# Patient Record
Sex: MALE | Race: WHITE | NOT HISPANIC OR LATINO | Employment: UNEMPLOYED | URBAN - NONMETROPOLITAN AREA
[De-identification: names, ages, dates, MRNs, and addresses within clinical notes are randomized per-mention and may not be internally consistent; named-entity substitution may affect disease eponyms.]

---

## 2024-03-26 ENCOUNTER — HOSPITAL ENCOUNTER (INPATIENT)
Facility: HOSPITAL | Age: 59
LOS: 2 days | Discharge: HOME | End: 2024-03-28
Attending: INTERNAL MEDICINE | Admitting: INTERNAL MEDICINE
Payer: MEDICAID

## 2024-03-26 DIAGNOSIS — I21.11 ST ELEVATION MYOCARDIAL INFARCTION INVOLVING RIGHT CORONARY ARTERY: Primary | ICD-10-CM

## 2024-03-26 PROBLEM — I21.3 STEMI (ST ELEVATION MYOCARDIAL INFARCTION): Status: ACTIVE | Noted: 2024-03-26

## 2024-03-26 PROCEDURE — C1874 STENT, COATED/COV W/DEL SYS: HCPCS | Performed by: INTERNAL MEDICINE

## 2024-03-26 PROCEDURE — 92929 PR PRQ TRLUML CORONARY STENT W/ANGIO ADDL ART/BRNCH: CPT | Performed by: INTERNAL MEDICINE

## 2024-03-26 PROCEDURE — C9601 PERC DRUG-EL COR STENT BRAN: HCPCS | Performed by: INTERNAL MEDICINE

## 2024-03-26 PROCEDURE — 92941 PRQ TRLML REVSC TOT OCCL AMI: CPT | Performed by: INTERNAL MEDICINE

## 2024-03-26 PROCEDURE — C9600 PERC DRUG-EL COR STENT SING: HCPCS | Performed by: INTERNAL MEDICINE

## 2024-03-26 PROCEDURE — 99223 1ST HOSP IP/OBS HIGH 75: CPT | Performed by: INTERNAL MEDICINE

## 2024-03-26 PROCEDURE — B2111ZZ FLUOROSCOPY OF MULTIPLE CORONARY ARTERIES USING LOW OSMOLAR CONTRAST: ICD-10-PCS | Performed by: INTERNAL MEDICINE

## 2024-03-26 PROCEDURE — 4A023N7 MEASUREMENT OF CARDIAC SAMPLING AND PRESSURE, LEFT HEART, PERCUTANEOUS APPROACH: ICD-10-PCS | Performed by: INTERNAL MEDICINE

## 2024-03-26 PROCEDURE — 25010000002 PHENYLEPHRINE 10 MG/ML SOLUTION: Performed by: INTERNAL MEDICINE

## 2024-03-26 PROCEDURE — C1725 CATH, TRANSLUMIN NON-LASER: HCPCS | Performed by: INTERNAL MEDICINE

## 2024-03-26 PROCEDURE — B2151ZZ FLUOROSCOPY OF LEFT HEART USING LOW OSMOLAR CONTRAST: ICD-10-PCS | Performed by: INTERNAL MEDICINE

## 2024-03-26 PROCEDURE — 93458 L HRT ARTERY/VENTRICLE ANGIO: CPT | Performed by: INTERNAL MEDICINE

## 2024-03-26 PROCEDURE — C1769 GUIDE WIRE: HCPCS | Performed by: INTERNAL MEDICINE

## 2024-03-26 PROCEDURE — 25010000002 ATROPINE PER 0.01 MG: Performed by: INTERNAL MEDICINE

## 2024-03-26 PROCEDURE — 25510000001 IOPAMIDOL PER 1 ML: Performed by: INTERNAL MEDICINE

## 2024-03-26 PROCEDURE — 25810000003 SODIUM CHLORIDE 0.9 % SOLUTION: Performed by: INTERNAL MEDICINE

## 2024-03-26 PROCEDURE — C1894 INTRO/SHEATH, NON-LASER: HCPCS | Performed by: INTERNAL MEDICINE

## 2024-03-26 PROCEDURE — 027136Z DILATION OF CORONARY ARTERY, TWO ARTERIES WITH THREE DRUG-ELUTING INTRALUMINAL DEVICES, PERCUTANEOUS APPROACH: ICD-10-PCS | Performed by: INTERNAL MEDICINE

## 2024-03-26 PROCEDURE — 93005 ELECTROCARDIOGRAM TRACING: CPT | Performed by: INTERNAL MEDICINE

## 2024-03-26 PROCEDURE — C1887 CATHETER, GUIDING: HCPCS | Performed by: INTERNAL MEDICINE

## 2024-03-26 PROCEDURE — 25010000002 HEPARIN (PORCINE) PER 1000 UNITS: Performed by: INTERNAL MEDICINE

## 2024-03-26 PROCEDURE — C1760 CLOSURE DEV, VASC: HCPCS | Performed by: INTERNAL MEDICINE

## 2024-03-26 DEVICE — XIENCE SKYPOINT™ EVEROLIMUS ELUTING CORONARY STENT SYSTEM 2.50 MM X 23 MM / RAPID-EXCHANGE
Type: IMPLANTABLE DEVICE | Site: CORONARY | Status: FUNCTIONAL
Brand: XIENCE SKYPOINT™

## 2024-03-26 DEVICE — XIENCE SKYPOINT™ EVEROLIMUS ELUTING CORONARY STENT SYSTEM 3.00 MM X 33 MM / RAPID-EXCHANGE
Type: IMPLANTABLE DEVICE | Site: CORONARY | Status: FUNCTIONAL
Brand: XIENCE SKYPOINT™

## 2024-03-26 DEVICE — XIENCE SKYPOINT™ EVEROLIMUS ELUTING CORONARY STENT SYSTEM 2.50 MM X 12 MM / RAPID-EXCHANGE
Type: IMPLANTABLE DEVICE | Site: CORONARY | Status: FUNCTIONAL
Brand: XIENCE SKYPOINT™

## 2024-03-26 RX ORDER — NITROGLYCERIN 0.4 MG/1
0.4 TABLET SUBLINGUAL
Status: DISCONTINUED | OUTPATIENT
Start: 2024-03-26 | End: 2024-03-28 | Stop reason: HOSPADM

## 2024-03-26 RX ORDER — SODIUM CHLORIDE 9 MG/ML
INJECTION, SOLUTION INTRAVENOUS
Status: COMPLETED | OUTPATIENT
Start: 2024-03-26 | End: 2024-03-26

## 2024-03-26 RX ORDER — TEMAZEPAM 15 MG/1
15 CAPSULE ORAL NIGHTLY PRN
Status: DISCONTINUED | OUTPATIENT
Start: 2024-03-26 | End: 2024-03-28 | Stop reason: HOSPADM

## 2024-03-26 RX ORDER — LIDOCAINE HYDROCHLORIDE 20 MG/ML
INJECTION, SOLUTION INFILTRATION; PERINEURAL
Status: DISCONTINUED | OUTPATIENT
Start: 2024-03-26 | End: 2024-03-26 | Stop reason: HOSPADM

## 2024-03-26 RX ORDER — ATROPINE SULFATE 1 MG/ML
INJECTION, SOLUTION INTRAMUSCULAR; INTRAVENOUS; SUBCUTANEOUS
Status: DISCONTINUED | OUTPATIENT
Start: 2024-03-26 | End: 2024-03-26 | Stop reason: HOSPADM

## 2024-03-26 RX ORDER — CLOPIDOGREL BISULFATE 75 MG/1
75 TABLET ORAL DAILY
Status: DISCONTINUED | OUTPATIENT
Start: 2024-03-27 | End: 2024-03-28

## 2024-03-26 RX ORDER — ONDANSETRON 2 MG/ML
4 INJECTION INTRAMUSCULAR; INTRAVENOUS EVERY 6 HOURS PRN
Status: DISCONTINUED | OUTPATIENT
Start: 2024-03-26 | End: 2024-03-28 | Stop reason: HOSPADM

## 2024-03-26 RX ORDER — DIPHENHYDRAMINE HCL 25 MG
25 CAPSULE ORAL EVERY 6 HOURS PRN
Status: DISCONTINUED | OUTPATIENT
Start: 2024-03-26 | End: 2024-03-28 | Stop reason: HOSPADM

## 2024-03-26 RX ORDER — ALUMINA, MAGNESIA, AND SIMETHICONE 2400; 2400; 240 MG/30ML; MG/30ML; MG/30ML
15 SUSPENSION ORAL EVERY 6 HOURS PRN
Status: DISCONTINUED | OUTPATIENT
Start: 2024-03-26 | End: 2024-03-28 | Stop reason: HOSPADM

## 2024-03-26 RX ORDER — PHENYLEPHRINE HYDROCHLORIDE 10 MG/ML
INJECTION INTRAVENOUS
Status: DISCONTINUED | OUTPATIENT
Start: 2024-03-26 | End: 2024-03-26 | Stop reason: HOSPADM

## 2024-03-26 RX ORDER — HEPARIN SODIUM 1000 [USP'U]/ML
INJECTION, SOLUTION INTRAVENOUS; SUBCUTANEOUS
Status: DISCONTINUED | OUTPATIENT
Start: 2024-03-26 | End: 2024-03-26 | Stop reason: HOSPADM

## 2024-03-26 RX ORDER — ACETAMINOPHEN 325 MG/1
650 TABLET ORAL EVERY 4 HOURS PRN
Status: DISCONTINUED | OUTPATIENT
Start: 2024-03-26 | End: 2024-03-28 | Stop reason: HOSPADM

## 2024-03-26 RX ORDER — ONDANSETRON 4 MG/1
4 TABLET, ORALLY DISINTEGRATING ORAL EVERY 6 HOURS PRN
Status: DISCONTINUED | OUTPATIENT
Start: 2024-03-26 | End: 2024-03-28 | Stop reason: HOSPADM

## 2024-03-26 RX ORDER — CLOPIDOGREL BISULFATE 75 MG/1
TABLET ORAL
Status: DISCONTINUED | OUTPATIENT
Start: 2024-03-26 | End: 2024-03-26 | Stop reason: HOSPADM

## 2024-03-26 RX ORDER — NICOTINE 21 MG/24HR
1 PATCH, TRANSDERMAL 24 HOURS TRANSDERMAL EVERY 24 HOURS
Status: DISCONTINUED | OUTPATIENT
Start: 2024-03-26 | End: 2024-03-28 | Stop reason: HOSPADM

## 2024-03-26 RX ORDER — ASPIRIN 81 MG/1
81 TABLET ORAL DAILY
Status: DISCONTINUED | OUTPATIENT
Start: 2024-03-27 | End: 2024-03-28 | Stop reason: HOSPADM

## 2024-03-26 RX ORDER — ALPRAZOLAM 0.25 MG/1
0.25 TABLET ORAL 3 TIMES DAILY PRN
Status: DISCONTINUED | OUTPATIENT
Start: 2024-03-26 | End: 2024-03-28 | Stop reason: HOSPADM

## 2024-03-26 RX ORDER — SODIUM CHLORIDE 9 MG/ML
75 INJECTION, SOLUTION INTRAVENOUS CONTINUOUS
Status: ACTIVE | OUTPATIENT
Start: 2024-03-26 | End: 2024-03-27

## 2024-03-26 RX ORDER — ATORVASTATIN CALCIUM 40 MG/1
40 TABLET, FILM COATED ORAL NIGHTLY
Status: DISCONTINUED | OUTPATIENT
Start: 2024-03-26 | End: 2024-03-28 | Stop reason: HOSPADM

## 2024-03-26 RX ADMIN — SODIUM CHLORIDE 75 ML/HR: 9 INJECTION, SOLUTION INTRAVENOUS at 22:16

## 2024-03-26 NOTE — Clinical Note
A 4 fr sheath was  inserted with ultrasound guidance into the right femoral artery. Sheath insertion not delayed.

## 2024-03-26 NOTE — Clinical Note
Emergency Flight Crew staff delivered patient to lab.  Consents and History and Physical not obtained due to patient condition.

## 2024-03-26 NOTE — Clinical Note
Replaced previous sheath in the right femoral artery. RD consult placed, recommend ongoing dietary f/u to ensure pt is meeting adequate daily caloric needs RD services to ensure adequate daily caloric nutritional intake/Registered Dietitian

## 2024-03-26 NOTE — Clinical Note
Second inflation of balloon - Max pressure = 12 daya. 2nd Inflation of balloon - Duration = 4 seconds.

## 2024-03-26 NOTE — Clinical Note
Conveyed Dr. Heredia's recommendations to Haleigh. Number given to Central scheduling and order placed. Haleigh will call back with any questions or concerns. Follow up appointment scheduled.    Stent balloon removed intact.

## 2024-03-26 NOTE — Clinical Note
Phoned [facility] Emergency Department.  Report received.  Advises flight crew is there to transport the patient.

## 2024-03-26 NOTE — Clinical Note
Phoned [facility] for updated on patient.    States she has not heard any further information on patient.    Will follow up.

## 2024-03-26 NOTE — Clinical Note
Second inflation of balloon - Max pressure = 10 daya. 2nd Inflation of balloon - Duration = 7 seconds.

## 2024-03-26 NOTE — Clinical Note
Third inflation of balloon - Max pressure = 12 daya. 3rd Inflation of balloon - Duration = 6 seconds.

## 2024-03-26 NOTE — Clinical Note
Second inflation of balloon - Max pressure = 20 daya. 2nd Inflation of balloon - Duration = 6 seconds.

## 2024-03-27 LAB
ANION GAP SERPL CALCULATED.3IONS-SCNC: 11.8 MMOL/L (ref 5–15)
BUN SERPL-MCNC: 15 MG/DL (ref 6–20)
BUN/CREAT SERPL: 14 (ref 7–25)
CALCIUM SPEC-SCNC: 8.8 MG/DL (ref 8.6–10.5)
CHLORIDE SERPL-SCNC: 105 MMOL/L (ref 98–107)
CHOLEST SERPL-MCNC: 184 MG/DL (ref 0–200)
CO2 SERPL-SCNC: 21.2 MMOL/L (ref 22–29)
CREAT SERPL-MCNC: 1.07 MG/DL (ref 0.76–1.27)
DEPRECATED RDW RBC AUTO: 51.4 FL (ref 37–54)
EGFRCR SERPLBLD CKD-EPI 2021: 81.4 ML/MIN/1.73
ERYTHROCYTE [DISTWIDTH] IN BLOOD BY AUTOMATED COUNT: 16.3 % (ref 12.3–15.4)
GEN 5 2HR TROPONIN T REFLEX: 3505 NG/L
GLUCOSE SERPL-MCNC: 135 MG/DL (ref 65–99)
HBA1C MFR BLD: 5.7 % (ref 4.8–5.6)
HCT VFR BLD AUTO: 42.8 % (ref 37.5–51)
HDLC SERPL-MCNC: 37 MG/DL (ref 40–60)
HGB BLD-MCNC: 13.7 G/DL (ref 13–17.7)
LDLC SERPL CALC-MCNC: 122 MG/DL (ref 0–100)
LDLC/HDLC SERPL: 3.24 {RATIO}
MCH RBC QN AUTO: 27.6 PG (ref 26.6–33)
MCHC RBC AUTO-ENTMCNC: 32 G/DL (ref 31.5–35.7)
MCV RBC AUTO: 86.3 FL (ref 79–97)
PLATELET # BLD AUTO: 199 10*3/MM3 (ref 140–450)
PMV BLD AUTO: 11.1 FL (ref 6–12)
POTASSIUM SERPL-SCNC: 4.7 MMOL/L (ref 3.5–5.2)
QT INTERVAL: 410 MS
QTC INTERVAL: 496 MS
RBC # BLD AUTO: 4.96 10*6/MM3 (ref 4.14–5.8)
SODIUM SERPL-SCNC: 138 MMOL/L (ref 136–145)
TRIGL SERPL-MCNC: 136 MG/DL (ref 0–150)
TROPONIN T DELTA: 2550 NG/L
TROPONIN T SERPL HS-MCNC: 955 NG/L
VLDLC SERPL-MCNC: 25 MG/DL (ref 5–40)
WBC NRBC COR # BLD AUTO: 10.78 10*3/MM3 (ref 3.4–10.8)

## 2024-03-27 PROCEDURE — 83036 HEMOGLOBIN GLYCOSYLATED A1C: CPT | Performed by: INTERNAL MEDICINE

## 2024-03-27 PROCEDURE — 93010 ELECTROCARDIOGRAM REPORT: CPT | Performed by: INTERNAL MEDICINE

## 2024-03-27 PROCEDURE — 94761 N-INVAS EAR/PLS OXIMETRY MLT: CPT

## 2024-03-27 PROCEDURE — 94799 UNLISTED PULMONARY SVC/PX: CPT

## 2024-03-27 PROCEDURE — 84484 ASSAY OF TROPONIN QUANT: CPT | Performed by: INTERNAL MEDICINE

## 2024-03-27 PROCEDURE — 85027 COMPLETE CBC AUTOMATED: CPT | Performed by: INTERNAL MEDICINE

## 2024-03-27 PROCEDURE — 80061 LIPID PANEL: CPT | Performed by: INTERNAL MEDICINE

## 2024-03-27 PROCEDURE — 93005 ELECTROCARDIOGRAM TRACING: CPT | Performed by: INTERNAL MEDICINE

## 2024-03-27 PROCEDURE — 80048 BASIC METABOLIC PNL TOTAL CA: CPT | Performed by: INTERNAL MEDICINE

## 2024-03-27 RX ORDER — METOPROLOL SUCCINATE 25 MG/1
12.5 TABLET, EXTENDED RELEASE ORAL
Status: DISCONTINUED | OUTPATIENT
Start: 2024-03-27 | End: 2024-03-28 | Stop reason: HOSPADM

## 2024-03-27 RX ORDER — LISINOPRIL 2.5 MG/1
2.5 TABLET ORAL DAILY
Status: DISCONTINUED | OUTPATIENT
Start: 2024-03-27 | End: 2024-03-28 | Stop reason: HOSPADM

## 2024-03-27 RX ADMIN — METOPROLOL SUCCINATE 12.5 MG: 25 TABLET, EXTENDED RELEASE ORAL at 17:43

## 2024-03-27 RX ADMIN — ATORVASTATIN CALCIUM 40 MG: 40 TABLET, FILM COATED ORAL at 20:37

## 2024-03-27 RX ADMIN — CLOPIDOGREL BISULFATE 75 MG: 75 TABLET, FILM COATED ORAL at 08:05

## 2024-03-27 RX ADMIN — ASPIRIN 81 MG: 81 TABLET, COATED ORAL at 08:05

## 2024-03-27 RX ADMIN — NICOTINE TRANSDERMAL SYSTEM 1 PATCH: 21 PATCH, EXTENDED RELEASE TRANSDERMAL at 20:37

## 2024-03-27 RX ADMIN — LISINOPRIL 2.5 MG: 2.5 TABLET ORAL at 17:43

## 2024-03-27 NOTE — CASE MANAGEMENT/SOCIAL WORK
Discharge Planning Assessment   Esa     Patient Name: Dave Townsend  MRN: [id number]  Today's Date: 3/27/2024    Admit Date: 3/26/2024    Plan: Patient is an independen unemployed [employer] who lives at home with his sister where he plans to return at discharge.  Patient does not have a PCP but one will be appointed to him at discharge and he would like the [facility] if possible.  Patient denies any insurance or financial issues at this time.  Patient does not utilize Home Health or have any DME and denies the need at this time.  Patient's family will provide transportation at discharge.  No other issues or concerns are noted at this time.  CM will continune to follow and assist with any dischage needs.   Discharge Needs Assessment       Row Name 03/27/24 1426       Living Environment    People in Home other relative(s)    Name(s) of People in Home Ramón    Current Living Arrangements home    Duration at Residence 20 yrs.    Potentially Unsafe Housing Conditions none    In the past 12 months has the electric, gas, oil, or water company threatened to shut off services in your home? Yes    Primary Care Provided by self    Provides Primary Care For no one    Family Caregiver if Needed none    Quality of Family Relationships helpful;involved;supportive    Able to Return to Prior Arrangements yes       Resource/Environmental Concerns    Resource/Environmental Concerns none    Transportation Concerns none       Transportation Needs    In the past 12 months, has lack of transportation kept you from medical appointments or from getting medications? no    In the past 12 months, has lack of transportation kept you from meetings, work, or from getting things needed for daily living? No       Food Insecurity    Within the past 12 months, you worried that your food would run out before you got the money to buy more. Never true    Within the past 12 months, the food you bought just didn't last and you didn't  have money to get more. Never true       Transition Planning    Patient/Family Anticipates Transition to home    Patient/Family Anticipated Services at Transition none    Transportation Anticipated family or friend will provide       Discharge Needs Assessment    Readmission Within the Last 30 Days no previous admission in last 30 days    Equipment Currently Used at Home none    Concerns to be Addressed no discharge needs identified;denies needs/concerns at this time    Anticipated Changes Related to Illness none    Equipment Needed After Discharge none                   Discharge Plan       Row Name 03/27/24 1420       Plan    Plan Patient is an independen unemployed [employer] who lives at home with his sister where he plans to return at discharge.  Patient does not have a PCP but one will be appointed to him at discharge and he would like the [facility] if possible.  Patient denies any insurance or financial issues at this time.  Patient does not utilize Home Health or have any DME and denies the need at this time.  Patient's family will provide transportation at discharge.  No other issues or concerns are noted at this time.  CM will continune to follow and assist with any dischage needs.    Patient/Family in Agreement with Plan yes      Row Name 03/27/24 6275       Plan    Plan Comments 3/27:  HST 3,505, TDelta 2,550, HDL 37, , Heart Cath=Stent % & 2 Stents to Posterolateral Branch 95%, Pt presented OSH with C/O CP, SOB & ST elevation-KLS                  Continued Care and Services - Admitted Since 3/26/2024    No active coordination exists for this encounter.       Expected Discharge Date and Time       Expected Discharge Date Expected Discharge Time    Mar 28, 2024            Demographic Summary       Row Name 03/27/24 142       General Information    Arrived From home;hospital  Home to niece's house to [facility]    Referral Source admission list;high risk screening    Reason for  Consult discharge planning;other (see comments)  CM Trigger    Preferred Language English      Row Name 03/27/24 1358       General Information    Admission Type inpatient                   Functional Status       Row Name 03/27/24 1424       Functional Status    Usual Activity Tolerance good    Current Activity Tolerance good       Physical Activity    On average, how many days per week do you engage in moderate to strenuous exercise (like a brisk walk)? 0 days    On average, how many minutes do you engage in exercise at this level? 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                  Abuse/Neglect    No documentation.                  Legal    No documentation.                  Substance Abuse    No documentation.                  Patient Forms    No documentation.                     Vicki Lima RN

## 2024-03-27 NOTE — PLAN OF CARE
Goal Outcome Evaluation:              Outcome Evaluation: Pt remains A+Ox4. Little episode of hypertension, afebrile, on RA. Bed alarm being refused, educated on the risks, pt stated he was aware. Call light within reach. Pt resting and denies any requests at this time.

## 2024-03-27 NOTE — PLAN OF CARE
Goal Outcome Evaluation:  Plan of Care Reviewed With: patient        Progress: no change  Outcome Evaluation: VSS on RA. Site checks as ordered. IVF infusing as ordered. Call light within reach. No complaints or distress noted at this time

## 2024-03-27 NOTE — PROGRESS NOTES
LOS: 1 day     Name: Dave Townsend  Age/Sex: 58 y.o. male  :  1965        PCP: Provider, No Known    Principal Problem:    STEMI (ST elevation myocardial infarction)      Admission Information: Dave Townsend is a 58 y.o. male with coronary artery disease, hypertension, and smoking.    Chief Complaint: STEMI    Interval history: Patient was taken urgently to the Cath Lab for invasive coronary angiogram.  He received stents to the RCA and RPLB    Subjective   Patient resting comfortably in bed at the time of my visit.  He denies chest pain, shortness of air, or palpitations.      Vital Signs  Vital Signs (last 72 hrs)          0700   0659  0700   0659  0700   0659  0700   1620   Most Recent      Temp (°F)     97.2 -  98.8    98.3 -  98.4     98.3 (36.8)  1200    Heart Rate     75 -  102    78 -  97     97  1600    Resp     15 -  29    10 -  25     16  1600    BP     109/78 -  153/98    137/98 -  148/106     --  Comment: pt took it off, refused  1500    SpO2 (%)     94 -  98    94 -  98     97  1600    Flow (L/min)       2       2 2035          Temp:  [97.2 °F (36.2 °C)-98.8 °F (37.1 °C)] 98.3 °F (36.8 °C)  Heart Rate:  [] 97  Resp:  [10-29] 16  BP: (109-153)/() 145/92  There is no height or weight on file to calculate BMI.      Intake/Output Summary (Last 24 hours) at 3/27/2024 1620  Last data filed at 3/27/2024 1200  Gross per 24 hour   Intake 1419.08 ml   Output 2250 ml   Net -830.92 ml       Vitals and nursing note reviewed.   Constitutional:       Appearance: Not in distress.   Eyes:      Conjunctiva/sclera: Conjunctivae normal.   Pulmonary:      Effort: Pulmonary effort is normal.      Breath sounds: Normal breath sounds.   Cardiovascular:      Normal rate. Regular rhythm.      Murmurs: There is no murmur.   Edema:     Peripheral edema absent.   Skin:     General: Skin is warm and dry.      Comments: Right femoral  cath access dressing clean, dry, and intact.  No evidence of hematoma or ecchymosis.   Neurological:      Mental Status: Alert.         Telemetry: Sinus 70s    Results Review:     Results from last 7 days   Lab Units 03/27/24  0107   WBC 10*3/mm3 10.78   HEMOGLOBIN g/dL 13.7   PLATELETS 10*3/mm3 199     Results from last 7 days   Lab Units 03/27/24  0107   SODIUM mmol/L 138   POTASSIUM mmol/L 4.7   CHLORIDE mmol/L 105   CO2 mmol/L 21.2*   BUN mg/dL 15   CREATININE mg/dL 1.07   CALCIUM mg/dL 8.8   GLUCOSE mg/dL 135*     Results from last 7 days   Lab Units 03/27/24  0822 03/27/24  0107   HSTROP T ng/L 3,505* 955*             I reviewed the patient's new clinical results.  I reviewed the patient's new imaging results and agree with the interpretation.  I personally viewed and interpreted the patient's EKG/Telemetry data      Medication Review:   aspirin, 81 mg, Oral, Daily  atorvastatin, 40 mg, Oral, Nightly  clopidogrel, 75 mg, Oral, Daily  nicotine, 1 patch, Transdermal, Q24H           Assessment:  Coronary artery disease status post stents to the RCA and RPLB  Hypertension  Smoking      Recommendations:  Troponins continue to trend up as expected.  Will monitor for decrease.  Postprocedural echocardiogram pending.  Have started GDMT with dual antiplatelet therapy, high intensity statin, beta-blocker, and ACE inhibitor.  Expecting better control with new antihypertensives on board  Strongly encourage cessation    I discussed the patients findings and my recommendations with patient.    Further decision making based on Dr. Mendosa's assessment and recommendations      Electronically signed by MARITA Juarez, 03/27/24, 4:26 PM EDT.    .Electronically signed by Barry Mendosa MD, 03/27/24, 7:23 PM EDT.

## 2024-03-27 NOTE — DISCHARGE INSTR - APPOINTMENTS
Schedule PCP appt with  on 4-4-2024 @ 1:15 pm, gave appt card tp PT    Schedule Cardiology appt with Cinthia KIRKLAND on 4- @ 1:00 pm, gave appt card to PT

## 2024-03-27 NOTE — H&P
.      Patient Identification:  Name:  Dave Townsend  Age:  58 y.o.  Sex:  male  :  1965  MRN:  [id number]   Visit Number:  [id number]  Primary Care Physician:  Provider, No Known    Chief complaint:   Chest pain  History of presenting illness:    Patient is a pleasant 58-year-old gentleman past medical history significant for coronary disease status post PTCA and stent placement to the right coronary artery in the past presented to the outside facility with complaints of chest pain that started earlier in the day.  Was associate with shortness of breath he was noted to have ST elevation inferiorly and was transferred to [place] for emergent cath.  Patient does have history of hypertension history of cigarette smoking.    ROS: All systems reviewed and negative except as mentioned above.     ---------------------------------------------------------------------------------------------------------------------   No past medical history on file.  No past surgical history on file.  No family history on file.  Social History     Socioeconomic History    Marital status: Single     ---------------------------------------------------------------------------------------------------------------------   Allergies:  Patient has no known allergies.  ---------------------------------------------------------------------------------------------------------------------   Prior to Admission Medications       None          Hospital Scheduled Meds:     sodium chloride, , Last Rate: 75 mL/hr (24)      ---------------------------------------------------------------------------------------------------------------------   Vital Signs:     There were no vitals filed for this visit.  There is no height or weight on file to calculate BMI.  ---------------------------------------------------------------------------------------------------------------------   Physical Exam:  Constitutional:  Well-developed and  "well-nourished.  No respiratory distress.      HENT:  Head: Normocephalic and atraumatic.  Mouth:  Moist mucous membranes.    Eyes:  Conjunctivae and EOM are normal.  Pupils are equal, round, and reactive to light.  No scleral icterus.  Neck:  Neck supple.  No JVD present.    Cardiovascular:  Normal rate, regular rhythm and normal heart sounds with no murmur.  Pulmonary/Chest:  No respiratory distress, no wheezes, no crackles, with normal breath sounds and good air movement.  Abdominal:  Soft.  Bowel sounds are normal.  No distension and no tenderness.   Musculoskeletal:  No edema, no tenderness, and no deformity.  No red or swollen joints anywhere.    Neurological:  Alert and oriented to person, place, and time.  No cranial nerve deficit.  No tongue deviation.  No facial droop.  No slurred speech.   Skin:  Skin is warm and dry.  No rash noted.  No pallor.   Psychiatric:  Normal mood and affect.  Behavior is normal.  Judgment and thought content normal.   Peripheral vascular:  No edema and strong pulses on all 4 extremities.  ---------------------------------------------------------------------------------------------------------------------  EKG: Sinus rhythm, ST elevation inferiorly  Telemetry: Sinus rhythm  I have personally looked at both the EKG and the telemetry strips.  ---------------------------------------------------------------------------------------------------------------------                 Invalid input(s): \"PROT\"CrCl cannot be calculated (No successful lab value found.).  No results found for: \"AMMONIA\"          No results found for: \"HGBA1C\"  No results found for: \"TSH\", \"FREET4\"  No results found for: \"PREGTESTUR\", \"PREGSERUM\", \"HCG\", \"HCGQUANT\"  Pain Management Panel           No data to display              No results found for: \"BLOODCX\"  No results found for: \"URINECX\"  No results found for: \"WOUNDCX\"  No results found for: \"STOOLCX\"    "   ---------------------------------------------------------------------------------------------------------------------  Imaging Results (Last 7 Days)       ** No results found for the last 168 hours. **            I have personally reviewed the radiology images and read the final radiology report.  ---------------------------------------------------------------------------------------------------------------------  Assessment and Plan:   #1 CardiAC. patient with acute coronary syndrome.  ST elevation MI inferior noted.  Emergent cath done.  PTCA and stent placement to the right coronary artery.  PTCA and stent placement x 2 to the posterolateral branch.  Patient received Plavix loading doses outside facility.  Will continue Plavix and aspirin for now.  Patient does have history of compliance issues in the past and will consider low-cost medication  #2 hypertension.  Patient history of hypertension.  Will consider angiotensin receptor blocker as tolerated.  Will hold off on beta-blockers due to slow heart rate.  #3 hyperlipidemia.  Will start Lipitor.  4. Consult rehab postdischarge          This document has been electronically signed by Barry Mendosa MD [facility], Interventional Cardiology  March 26, 2024 21:25 EDT

## 2024-03-28 ENCOUNTER — APPOINTMENT (OUTPATIENT)
Dept: CARDIOLOGY | Facility: HOSPITAL | Age: 59
End: 2024-03-28
Payer: MEDICAID

## 2024-03-28 ENCOUNTER — READMISSION MANAGEMENT (OUTPATIENT)
Dept: CALL CENTER | Facility: HOSPITAL | Age: 59
End: 2024-03-28
Payer: MEDICAID

## 2024-03-28 VITALS
RESPIRATION RATE: 20 BRPM | HEART RATE: 77 BPM | TEMPERATURE: 98.2 F | DIASTOLIC BLOOD PRESSURE: 62 MMHG | OXYGEN SATURATION: 99 % | WEIGHT: 223.55 LBS | SYSTOLIC BLOOD PRESSURE: 123 MMHG

## 2024-03-28 LAB
BH CV ECHO MEAS - ACS: 2 CM
BH CV ECHO MEAS - AO MAX PG: 4.5 MMHG
BH CV ECHO MEAS - AO MEAN PG: 2 MMHG
BH CV ECHO MEAS - AO ROOT DIAM: 3.5 CM
BH CV ECHO MEAS - AO V2 MAX: 106 CM/SEC
BH CV ECHO MEAS - AO V2 VTI: 18.8 CM
BH CV ECHO MEAS - EDV(CUBED): 294.1 ML
BH CV ECHO MEAS - EDV(MOD-SP4): 224 ML
BH CV ECHO MEAS - EF(MOD-SP4): 42.9 %
BH CV ECHO MEAS - ESV(CUBED): 174.7 ML
BH CV ECHO MEAS - ESV(MOD-SP4): 128 ML
BH CV ECHO MEAS - FS: 15.9 %
BH CV ECHO MEAS - IVS/LVPW: 0.98 CM
BH CV ECHO MEAS - IVSD: 1.02 CM
BH CV ECHO MEAS - LA DIMENSION: 4.1 CM
BH CV ECHO MEAS - LAT PEAK E' VEL: 6.1 CM/SEC
BH CV ECHO MEAS - LV DIASTOLIC VOL/BSA (35-75): 101.4 CM2
BH CV ECHO MEAS - LV MASS(C)D: 307.2 GRAMS
BH CV ECHO MEAS - LV SYSTOLIC VOL/BSA (12-30): 58 CM2
BH CV ECHO MEAS - LVIDD: 6.7 CM
BH CV ECHO MEAS - LVIDS: 5.6 CM
BH CV ECHO MEAS - LVOT AREA: 4.9 CM2
BH CV ECHO MEAS - LVOT DIAM: 2.5 CM
BH CV ECHO MEAS - LVPWD: 1.04 CM
BH CV ECHO MEAS - MED PEAK E' VEL: 5.6 CM/SEC
BH CV ECHO MEAS - MV A MAX VEL: 57.4 CM/SEC
BH CV ECHO MEAS - MV E MAX VEL: 105 CM/SEC
BH CV ECHO MEAS - MV E/A: 1.83
BH CV ECHO MEAS - PA ACC TIME: 0.13 SEC
BH CV ECHO MEAS - SI(MOD-SP4): 43.5 ML/M2
BH CV ECHO MEAS - SV(MOD-SP4): 96 ML
BH CV ECHO MEASUREMENTS AVERAGE E/E' RATIO: 17.95
GEN 5 2HR TROPONIN T REFLEX: 2243 NG/L
LEFT ATRIUM VOLUME INDEX: 44.5 ML/M2
LV EF 2D ECHO EST: 30 %
TROPONIN T DELTA: 32 NG/L
TROPONIN T SERPL HS-MCNC: 2211 NG/L

## 2024-03-28 PROCEDURE — 93306 TTE W/DOPPLER COMPLETE: CPT

## 2024-03-28 PROCEDURE — 93306 TTE W/DOPPLER COMPLETE: CPT | Performed by: INTERNAL MEDICINE

## 2024-03-28 PROCEDURE — 84484 ASSAY OF TROPONIN QUANT: CPT | Performed by: NURSE PRACTITIONER

## 2024-03-28 PROCEDURE — 99239 HOSP IP/OBS DSCHRG MGMT >30: CPT | Performed by: INTERNAL MEDICINE

## 2024-03-28 RX ORDER — LISINOPRIL 2.5 MG/1
2.5 TABLET ORAL DAILY
Qty: 30 TABLET | Refills: 1 | Status: SHIPPED | OUTPATIENT
Start: 2024-03-29

## 2024-03-28 RX ORDER — ASPIRIN 81 MG/1
81 TABLET ORAL DAILY
Qty: 30 TABLET | Refills: 1 | Status: SHIPPED | OUTPATIENT
Start: 2024-03-29

## 2024-03-28 RX ORDER — METOPROLOL SUCCINATE 25 MG/1
12.5 TABLET, EXTENDED RELEASE ORAL
Qty: 30 TABLET | Refills: 1 | Status: SHIPPED | OUTPATIENT
Start: 2024-03-29

## 2024-03-28 RX ORDER — NICOTINE 21 MG/24HR
1 PATCH, TRANSDERMAL 24 HOURS TRANSDERMAL EVERY 24 HOURS
Qty: 30 PATCH | Refills: 0 | Status: SHIPPED | OUTPATIENT
Start: 2024-03-28

## 2024-03-28 RX ORDER — ATORVASTATIN CALCIUM 40 MG/1
40 TABLET, FILM COATED ORAL NIGHTLY
Qty: 30 TABLET | Refills: 1 | Status: SHIPPED | OUTPATIENT
Start: 2024-03-28

## 2024-03-28 RX ORDER — NITROGLYCERIN 0.4 MG/1
0.4 TABLET SUBLINGUAL
Qty: 25 TABLET | Refills: 0 | Status: SHIPPED | OUTPATIENT
Start: 2024-03-28

## 2024-03-28 RX ADMIN — CLOPIDOGREL BISULFATE 75 MG: 75 TABLET, FILM COATED ORAL at 08:21

## 2024-03-28 RX ADMIN — ASPIRIN 81 MG: 81 TABLET, COATED ORAL at 08:21

## 2024-03-28 RX ADMIN — LISINOPRIL 2.5 MG: 2.5 TABLET ORAL at 08:21

## 2024-03-28 RX ADMIN — METOPROLOL SUCCINATE 12.5 MG: 25 TABLET, EXTENDED RELEASE ORAL at 08:21

## 2024-03-28 RX ADMIN — TICAGRELOR 180 MG: 90 TABLET ORAL at 13:15

## 2024-03-28 NOTE — CASE MANAGEMENT/SOCIAL WORK
Case Management Discharge Note      Final Note: Patient is being discharged home on this date 3/28/24.  No needs identified.         Selected Continued Care - Admitted Since 3/26/2024                  Final Discharge Disposition Code: 01 - home or self-care

## 2024-03-28 NOTE — DISCHARGE SUMMARY
Patient Identification  Name:  Dave Townsend  Age:  58 y.o.  Sex:  male  :  1965  MRN:  [id number]  Visit Number:  [id number]    Date of Admission: 3/26/2024  Date of Discharge: 3/28/2024    PCP: Provider, No Known    DISCHARGE DIAGNOSIS  STEMI  Coronary artery disease status post PCI of MICHAEL to the RCA and 2 stents to the posterolateral branch  Hypertension  Hyperlipidemia    CONSULTS   None placed    PROCEDURES PERFORMED  Selective right and left coronary angiogram with left heart catheterization  PCI of MICHAEL to the RCA and 2 stents to the posterolateral branch  Left ventriculogram      HOSPITAL COURSE  Patient is a 58 y.o. male with coronary artery disease and previous PTCA and stent placement in the RCA.  Please see the admitting history and physical for further details.  After having been found to be having a STEMI he was taken emergently to the Cath Lab where he underwent the below described procedure.  His postprocedural course was uncomplicated.  He denied chest pain, troponins trended downward, dual antiplatelet therapy was facilitated for him, and he was deemed stable for discharge.    CONDITION ON DISCHARGE  Stable.    VITAL SIGNS  /62   Pulse 77   Temp 98.2 °F (36.8 °C) (Oral)   Resp 20   Wt 101 kg (223 lb 8.7 oz)   SpO2 99%     DISCHARGE PHYSICAL EXAM  Vitals and nursing note reviewed.   Constitutional:       Appearance: Not in distress.   Eyes:      Conjunctiva/sclera: Conjunctivae normal.   Pulmonary:      Effort: Pulmonary effort is normal.      Breath sounds: Normal breath sounds.   Cardiovascular:      Normal rate. Regular rhythm.      Murmurs: There is no murmur.   Edema:     Peripheral edema absent.   Skin:     General: Skin is warm and dry.      Comments: Right femoral cath access site open to air and without signs or symptoms of bleeding, bruising, or infection   Neurological:      Mental Status: Alert.         RESULTS REVIEW  I reviewed the patient's new clinical  "results.  Results for orders placed during the hospital encounter of 03/26/24    Cardiac Catheterization/Vascular Study    Conclusion    Mid RCA-2 lesion is 60% stenosed.    Dist RCA lesion is 100% stenosed.    RPAV lesion is 95% stenosed.    1.  Cardiac.  Patient with acute coronary syndrome involving the right coronary artery with 100% stenosis prior to intervention.  PTCA and stent placement to the right coronary artery done.  Angioplasty and stent of the posterolateral branch done also    [unfilled]    Results from last 7 days   Lab Units 03/28/24  1218 03/28/24  0930 03/27/24  0822 03/27/24  0107   HSTROP T ng/L 2,243* 2,211* 3,505* 955*     Results from last 7 days   Lab Units 03/27/24  0107   WBC 10*3/mm3 10.78   HEMOGLOBIN g/dL 13.7   PLATELETS 10*3/mm3 199     Results from last 7 days   Lab Units 03/27/24  0107   SODIUM mmol/L 138   POTASSIUM mmol/L 4.7   CHLORIDE mmol/L 105   CO2 mmol/L 21.2*   BUN mg/dL 15   CREATININE mg/dL 1.07   CALCIUM mg/dL 8.8   GLUCOSE mg/dL 135*     No results found for: \"INR\"  No results found for: \"MG\"  Lab Results   Component Value Date    TRIG 136 03/27/2024    HDL 37 (L) 03/27/2024     (H) 03/27/2024      No results found for: \"PROBNP\"    DISCHARGE DISPOSITION   Home or Self Care    DISCHARGE MEDICATIONS     Discharge Medications        New Medications        Instructions Start Date   Aspirin Low Dose 81 MG EC tablet  Generic drug: aspirin   81 mg, Oral, Daily   Start Date: March 29, 2024     atorvastatin 40 MG tablet  Commonly known as: LIPITOR   40 mg, Oral, Nightly      Brilinta 90 MG tablet tablet  Generic drug: ticagrelor   90 mg, Oral, 2 Times Daily   Start Date: March 29, 2024     lisinopril 2.5 MG tablet  Commonly known as: PRINIVIL,ZESTRIL   2.5 mg, Oral, Daily   Start Date: March 29, 2024     metoprolol succinate XL 25 MG 24 hr tablet  Commonly known as: TOPROL-XL   Take 1/2 tablet by mouth Daily.   Start Date: March 29, 2024     nicotine 21 " MG/24HR patch  Commonly known as: NICODERM CQ   1 patch, Transdermal, Every 24 Hours      nitroglycerin 0.4 MG SL tablet  Commonly known as: NITROSTAT   Place 1 tablet under the tongue Every 5 minutes as Needed for Chest Pain (Systolic BP Greater Than 100). Take no more than 3 doses in 15 minutes.               Diet Instructions    Heart healthy diet.             Activity Instructions    Activity as tolerated.            Your Scheduled Appointments      Apr 04, 2024  1:15 PM  New Patient with Claudio Jane MD  [facility] FAMILY [facility]IC[facility] [address] [address] [phone]   Please arrive 30 mins prior to your scheduled appointment time. Additionally, bring any of the following:  Photo ID  Insurance cards  Medications and supplements (in the original bottles)  Any payment you may have (co-pay, co-insurance, pre-pay)  Medical records from your previous primary care provider          Apr 11, 2024  1:00 PM  Hospital Follow Up with MARITA Juarez  [facility] CARDIOLOGY [address] [address] [phone]   -Bring photo ID, insurance card, and list of medications to appointment  -If testing was completed outside of [facility] then patient must bring images on a disc  -Copay will be collected at time of appointment  -Established patients should arrive at time of appointment       Additional instructions:    Schedule PCP appt with  on 4-4-2024 @ 1:15 pm, gave appt card tp PT    Schedule Cardiology appt with Cinthia KIRKLAND on 4- @ 1:00 pm, gave appt card to PT           Additional Instructions for the Follow-ups that You Need to Schedule       Ambulatory Referral to Cardiac Rehab   As directed              TEST  RESULTS PENDING AT DISCHARGE       Electronically signed by MARITA Juarez, 03/28/24, 5:03 PM EDT.        Time: greater than 30 minutes.     .Electronically signed by Barry  MD Deondre, 03/28/24, 6:37 PM EDT.

## 2024-03-28 NOTE — NURSING NOTE
Order received for Cardiac Rehab Consultation.       Information discussed with: Patient        Educated on: Benefits of Exercise,  Educated on Cardiac Rehab and Program Protocol, Brochure and/or educational material provided, Contact information given, and Teach Back Verified          Comments: Pt stated he doesn't have transportation and also lives quite a ways away. I gave pt our direct number and told him to call us if he thinks he could make arrangements or needs help with appts after he is home.       Thank you for the referral. Please contact the Cardiac Rehab Dept. (ext. [phone]) with any further questions or concerns.

## 2024-03-29 ENCOUNTER — TRANSITIONAL CARE MANAGEMENT TELEPHONE ENCOUNTER (OUTPATIENT)
Dept: CALL CENTER | Facility: HOSPITAL | Age: 59
End: 2024-03-29
Payer: MEDICAID

## 2024-03-29 LAB
QT INTERVAL: 406 MS
QTC INTERVAL: 493 MS

## 2024-03-29 NOTE — OUTREACH NOTE
Prep Survey      Flowsheet Row Responses   [facility] patient discharged from? [facility]   Is LACE score < 7 ? Yes   Eligibility [facility]   Date of Admission 03/26/24   Date of Discharge 03/28/24   Discharge Disposition Home or Self Care   Discharge diagnosis STEMI (ST elevation myocardial infarction), heart cath & stents   Does the patient have one of the following disease processes/diagnoses(primary or secondary)? Acute MI (STEMI,NSTEMI)   Does the patient have Home health ordered? No   Is there a DME ordered? No   Comments regarding appointments new PCP appt   Prep survey completed? Yes            Corine YI - Registered Nurse

## 2024-03-29 NOTE — OUTREACH NOTE
Call Center TCM Note      Flowsheet Row Responses   [facility] [place] patient discharged from? [facility]   Does the patient have one of the following disease processes/diagnoses(primary or secondary)? Acute MI (STEMI,NSTEMI)   TCM attempt successful? Yes   Call start time 1120   Call end time 1122   Discharge diagnosis STEMI (ST elevation myocardial infarction), heart cath & stents   Meds reviewed with patient/caregiver? Yes   Is the patient having any side effects they believe may be caused by any medication additions or changes? No   Does the patient have all prescriptions related to this admission filled (includes statins,anticoagulants,HTN meds,anti-arrhythmia meds) Yes   Is the patient taking all medications as directed (includes completed medication regime)? Yes   Comments HOSP DC FU/ NEW PT appt 4/4/24 115 pm.   Does the patient have an appointment with their PCP within 7-14 days of discharge? Yes   Has home health visited the patient within 72 hours of discharge? N/A   Psychosocial issues? No   Did the patient receive a copy of their discharge instructions? Yes   Nursing interventions Reviewed instructions with patient   What is the patient's perception of their health status since discharge? Improving   Nursing interventions Nurse provided patient education   Is the patient/caregiver able to teach back signs and symptoms of when to call for help immediately: Sudden chest discomfort, Sudden discomfort in arms, back, neck or jaw, Shortness of breath at any time, Sudden sweating or clammy skin, Nausea or vomiting, Dizziness or lightheadedness, Irregular or rapid heart rate   Nursing interventions Nurse provided patient education   Is the patient/caregiver able to teach back lifestyle changes to help prevent MIs Heart healthy diet, Reducing stress, Quit smoking   Is the patient/caregiver able to teach back ways to prevent a second heart attack: Take medications, Follow up with MD   If the patient is a current  smoker, are they able to teach back resources for cessation? [id number], Smoking cessation medications   Is the patient/caregiver able to teach back the hierarchy of who to call/visit for symptoms/problems? PCP, Specialist, Home health nurse, Urgent Care, ED, 911 Yes   TCM call completed? Yes   Wrap up additional comments Pt reports improvement. reviewed S/S to monitor for with cath site. Denies issues.   Call end time 1122            Cassie Desir RN    3/29/2024, 11:22 EDT

## 2024-03-29 NOTE — OUTREACH NOTE
Call Center TCM Note      Flowsheet Row Responses   [facility] facility patient discharged from? [facility]   Does the patient have one of the following disease processes/diagnoses(primary or secondary)? Acute MI (STEMI,NSTEMI)   TCM attempt successful? No   Unsuccessful attempts Attempt 1   Comments HOSP DC FU/ NEW PT appt 4/4/24 115 pm.   Does the patient have an appointment with their PCP within 7-14 days of discharge? Yes            Cassie Desir RN    3/29/2024, 10:11 EDT

## 2024-04-04 ENCOUNTER — OFFICE VISIT (OUTPATIENT)
Dept: FAMILY MEDICINE CLINIC | Facility: CLINIC | Age: 59
End: 2024-04-04
Payer: MEDICAID

## 2024-04-04 VITALS
TEMPERATURE: 98.6 F | HEIGHT: 71 IN | DIASTOLIC BLOOD PRESSURE: 64 MMHG | HEART RATE: 94 BPM | SYSTOLIC BLOOD PRESSURE: 126 MMHG | OXYGEN SATURATION: 98 % | RESPIRATION RATE: 14 BRPM | WEIGHT: 219 LBS | BODY MASS INDEX: 30.66 KG/M2

## 2024-04-04 DIAGNOSIS — Z00.00 HEALTHCARE MAINTENANCE: ICD-10-CM

## 2024-04-04 DIAGNOSIS — I10 ESSENTIAL HYPERTENSION: ICD-10-CM

## 2024-04-04 DIAGNOSIS — I50.20 HFREF (HEART FAILURE WITH REDUCED EJECTION FRACTION): ICD-10-CM

## 2024-04-04 DIAGNOSIS — J44.9 MIXED TYPE COPD (CHRONIC OBSTRUCTIVE PULMONARY DISEASE): ICD-10-CM

## 2024-04-04 DIAGNOSIS — E78.2 MIXED HYPERLIPIDEMIA: ICD-10-CM

## 2024-04-04 DIAGNOSIS — F17.200 SMOKER: ICD-10-CM

## 2024-04-04 DIAGNOSIS — I21.11 ST ELEVATION MYOCARDIAL INFARCTION INVOLVING RIGHT CORONARY ARTERY: Primary | ICD-10-CM

## 2024-04-04 DIAGNOSIS — R73.03 PREDIABETES: ICD-10-CM

## 2024-04-04 RX ORDER — VARENICLINE TARTRATE 1 MG/1
1 TABLET, FILM COATED ORAL 2 TIMES DAILY
Qty: 56 TABLET | Refills: 4 | Status: SHIPPED | OUTPATIENT
Start: 2024-05-02 | End: 2024-09-19

## 2024-04-04 RX ORDER — VARENICLINE TARTRATE 0.5 (11)-1
KIT ORAL
Qty: 1 EACH | Refills: 0 | Status: SHIPPED | OUTPATIENT
Start: 2024-04-04 | End: 2024-05-02

## 2024-04-04 NOTE — PROGRESS NOTES
Subjective   Dave Townsend is a 58 y.o. male.     Chief Complaint  This 58-year-old man presents today to establish care    History of Present Illness     Coronary Artery Disease  He underwent stenting x 2 of the RCA by Dr. Mendosa on 3/26/2024 after presenting with an inferior STEMI.  His coronary angiogram was otherwise unremarkable.  Echocardiogram done while in hospital revealed mild to moderate left ventricular dilation with an estimated EF of 30%.  He was ultimately discharged on dual antiplatelet therapy, and will undergo a cardiology reassessment with MARITA Latham on 4/11/2024.  He has felt well since his return home.he admits to shortness of breath with above average exertion, but feels this is at baseline.  He denies any chest pain, palpitations, lightheadedness, apnea, PND, calf pain, or swelling of the ankles.  He gives a history of previous MI treated in [place].  He states that he stopped all of his medications 5 or 6 years ago.  Lab Results   Component Value Date    WBC 10.78 03/27/2024    HGB 13.7 03/27/2024    HCT 42.8 03/27/2024    MCV 86.3 03/27/2024     03/27/2024     Essential Hypertension  He gives a long history of hypertension.  He was discharged on lisinopril and metoprolol and denies any apparent side effects.  Lab Results   Component Value Date    GLUCOSE 135 (H) 03/27/2024    CALCIUM 8.8 03/27/2024     03/27/2024    K 4.7 03/27/2024    CO2 21.2 (L) 03/27/2024     03/27/2024    BUN 15 03/27/2024    CREATININE 1.07 03/27/2024    EGFR 81.4 03/27/2024    BCR 14.0 03/27/2024    ANIONGAP 11.8 03/27/2024     Hyperlipidemia  He was discharged on atorvastatin, and again, denies any apparent side effects.  Lab Results   Component Value Date    CHOL 184 03/27/2024    TRIG 136 03/27/2024    HDL 37 (L) 03/27/2024     (H) 03/27/2024     Prediabetes  His A1c was elevated in hospital.  He states his glucose has never been high in the past.  Lab Results   Component Value  Date    HGBA1C 5.70 (H) 03/27/2024     Smoker  He gives a 50-pack-year history and is currently averaging 1 pack/day.  He was discharged on nicotine patches.    The following portions of the patient's history were reviewed and updated as appropriate: allergies, current medications, past family history, past medical history, past social history, past surgical history, and problem list.    Review of Systems   Constitutional:  Positive for fatigue. Negative for chills and fever.   HENT:  Negative for congestion, ear pain, hearing loss, postnasal drip, rhinorrhea, sinus pressure, sneezing, sore throat and voice change.    Eyes:  Negative for visual disturbance.   Respiratory:  Positive for cough and shortness of breath. Negative for wheezing.    Cardiovascular:  Negative for chest pain, palpitations and leg swelling.   Gastrointestinal:  Negative for abdominal pain, blood in stool, constipation, diarrhea, nausea, vomiting and GERD.   Genitourinary:  Positive for nocturia (2-3). Negative for dysuria, frequency, hematuria and urgency.   Musculoskeletal:  Negative for arthralgias, back pain, joint swelling, myalgias and neck pain.   Skin:  Negative for rash.   Neurological:  Negative for weakness and numbness.   Psychiatric/Behavioral:  Positive for sleep disturbance. Negative for suicidal ideas and depressed mood. The patient is not nervous/anxious.      Objective   Physical Exam  Constitutional:       General: He is not in acute distress.     Appearance: Normal appearance. He is well-developed. He is not ill-appearing or diaphoretic.      Comments: Appeared a bit older than stated age.  Alert and in fair spirits. No apparent distress. No pallor, jaundice, diaphoresis, or cyanosis.     HENT:      Head: Atraumatic.      Right Ear: Tympanic membrane, ear canal and external ear normal.      Left Ear: Tympanic membrane, ear canal and external ear normal.      Mouth/Throat:      Lips: No lesions.      Mouth: Mucous membranes  are moist. No oral lesions.      Dentition: Dental caries present.      Pharynx: No oropharyngeal exudate or posterior oropharyngeal erythema.   Eyes:      General: Lids are normal. Gaze aligned appropriately.      Extraocular Movements: Extraocular movements intact.      Conjunctiva/sclera: Conjunctivae normal.      Pupils: Pupils are equal.   Neck:      Thyroid: No thyroid mass or thyromegaly.      Vascular: No carotid bruit.      Trachea: Trachea normal. No tracheal deviation.   Cardiovascular:      Rate and Rhythm: Normal rate and regular rhythm.      Pulses:           Dorsalis pedis pulses are 2+ on the right side and 2+ on the left side.        Posterior tibial pulses are 2+ on the right side and 2+ on the left side.      Heart sounds: Normal heart sounds, S1 normal and S2 normal. No murmur heard.     No gallop.      Comments: No calf tenderness  Pulmonary:      Effort: Pulmonary effort is normal.      Breath sounds: Wheezing (diffuse - mild) present.      Comments: Pulmonary hyperinflation  Abdominal:      General: Bowel sounds are normal. There is no distension or abdominal bruit.      Palpations: Abdomen is soft. There is no hepatomegaly, splenomegaly or mass.      Tenderness: There is no abdominal tenderness.      Hernia: No hernia is present.   Musculoskeletal:      Right lower leg: No edema.      Left lower leg: No edema.   Lymphadenopathy:      Head:      Right side of head: No submental, submandibular, tonsillar, preauricular, posterior auricular or occipital adenopathy.      Left side of head: No submental, submandibular, tonsillar, preauricular, posterior auricular or occipital adenopathy.      Cervical: No cervical adenopathy.      Upper Body:      Right upper body: No supraclavicular adenopathy.      Left upper body: No supraclavicular adenopathy.   Skin:     General: Skin is warm and dry.      Coloration: Skin is not cyanotic, jaundiced or pale.      Findings: No rash.      Nails: There is no  clubbing.   Neurological:      Mental Status: He is alert and oriented to person, place, and time.      Cranial Nerves: No dysarthria or facial asymmetry.      Sensory: No sensory deficit.      Motor: No tremor.      Coordination: Coordination normal.      Gait: Gait normal.   Psychiatric:         Attention and Perception: Attention normal.         Mood and Affect: Mood normal.         Speech: Speech normal.         Behavior: Behavior normal.         Thought Content: Thought content normal.       Assessment & Plan   Problems Addressed this Visit          Cardiac and Vasculature    Essential hypertension   Hypertension: at goal. Evidence of target organ damage: coronary artery disease and heart failure.  Encouraged to continue to work on diet and exercise plan.   Continue current medication    Relevant Orders    Comprehensive Metabolic Panel    TSH    Urinalysis With Microscopic If Indicated (No Culture) - Urine, Clean Catch    HFrEF (heart failure with reduced ejection fraction)  Will plan on updating an echo or a MUGA scan at his return    Relevant Orders    TSH    Mixed hyperlipidemia  As above.   Continue current medication.  Scheduled for updated labs in 4 to 6 weeks    Relevant Orders    Comprehensive Metabolic Panel    Lipid Panel    TSH    STEMI (ST elevation myocardial infarction)  S/P stenting x 2 of the RCA  Reminded regarding risk factor modification with an emphasis on tobacco cessation.  Continue dual antiplatelet therapy.  Follow up with cardiology     Relevant Orders    CBC & Differential       Endocrine and Metabolic    Prediabetes  As above.  Will consider starting on an SGLT2 inhibitor at his return    Relevant Orders    Comprehensive Metabolic Panel    TSH       Health Encounters    Healthcare maintenance  Advised that he is due for pneumococcal and shingles vaccination along with an updated Tdap  Recommended influenza vaccination in the fall  Reviewed the potential benefits of colon, lung, and  prostate cancer screening.  Patient will consider and this will be discussed further at his return    Relevant Orders    Hepatitis C Antibody       Pulmonary and Pneumonias    Mixed type COPD (chronic obstructive pulmonary disease)  Reminded regarding the importance of smoking cessation    Relevant Orders    CBC & Differential       Tobacco    Smoker  Lengthy discussion regarding the potential sequela of continued tobacco use and the options with respect to cessation.  Will add varenicline to nicotine replacement    Relevant Medications    Varenicline Tartrate, Starter, 0.5 MG X 11 & 1 MG X 42 tablet therapy pack    varenicline (CHANTIX) 1 MG tablet (Start on 5/2/2024)     Diagnoses         Codes Comments    ST elevation myocardial infarction involving right coronary artery    -  Primary ICD-10-CM: I21.11  ICD-9-CM: 410.31     Mixed hyperlipidemia     ICD-10-CM: E78.2  ICD-9-CM: 272.2     Essential hypertension     ICD-10-CM: I10  ICD-9-CM: 401.9     Prediabetes     ICD-10-CM: R73.03  ICD-9-CM: 790.29     Healthcare maintenance     ICD-10-CM: Z00.00  ICD-9-CM: V70.0     Mixed type COPD (chronic obstructive pulmonary disease)     ICD-10-CM: J44.9  ICD-9-CM: 496     Smoker     ICD-10-CM: F17.200  ICD-9-CM: 305.1     HFrEF (heart failure with reduced ejection fraction)     ICD-10-CM: I50.20  ICD-9-CM: 428.20           I spent 66 minutes caring for Dave Townsend on this date of service. This time includes time spent by me in the following activities:reviewing tests, performing a medically appropriate examination and/or evaluation , counseling and educating the patient/family/caregiver, ordering medications, tests, or procedures and documenting information in the medical record

## 2024-04-08 ENCOUNTER — PATIENT ROUNDING (BHMG ONLY) (OUTPATIENT)
Dept: FAMILY MEDICINE CLINIC | Facility: CLINIC | Age: 59
End: 2024-04-08
Payer: MEDICAID

## 2024-04-08 NOTE — PROGRESS NOTES
April 8, 2024    Hello, may I speak with Dave Townsend?    My name is Terri Flowers    I am  with [facility]  [address] [place] [id number] [phone].    Before we get started may I verify your date of birth? 1965    I am calling to officially welcome you to our practice and ask about your recent visit. Is this a good time to talk? No, left VM    Tell me about your visit with us. What things went well?         We're always looking for ways to make our patients' experiences even better. Do you have recommendations on ways we may improve?      Overall were you satisfied with your first visit to our practice?        I appreciate you taking the time to speak with me today. Is there anything else I can do for you?       Thank you, and have a great day.

## 2024-04-08 NOTE — PROGRESS NOTES
April 8, 2024    Hello, may I speak with Dave Townsend?    My name is eTrri Flowers    I am  with [facility]  [address] [place] [id number] [phone].    Before we get started may I verify your date of birth? 1965    I am calling to officially welcome you to our practice and ask about your recent visit. Is this a good time to talk? No left VM    Tell me about your visit with us. What things went well?         We're always looking for ways to make our patients' experiences even better. Do you have recommendations on ways we may improve?      Overall were you satisfied with your first visit to our practice?        I appreciate you taking the time to speak with me today. Is there anything else I can do for you?       Thank you, and have a great day.

## 2024-04-11 ENCOUNTER — OFFICE VISIT (OUTPATIENT)
Dept: CARDIOLOGY | Facility: CLINIC | Age: 59
End: 2024-04-11
Payer: MEDICAID

## 2024-04-11 VITALS
WEIGHT: 222 LBS | DIASTOLIC BLOOD PRESSURE: 91 MMHG | HEART RATE: 88 BPM | SYSTOLIC BLOOD PRESSURE: 143 MMHG | HEIGHT: 71 IN | OXYGEN SATURATION: 97 % | BODY MASS INDEX: 31.08 KG/M2

## 2024-04-11 DIAGNOSIS — F17.200 SMOKER: ICD-10-CM

## 2024-04-11 DIAGNOSIS — I10 ESSENTIAL HYPERTENSION: Chronic | ICD-10-CM

## 2024-04-11 DIAGNOSIS — E78.2 MIXED HYPERLIPIDEMIA: Chronic | ICD-10-CM

## 2024-04-11 DIAGNOSIS — I25.10 CORONARY ARTERY DISEASE INVOLVING NATIVE CORONARY ARTERY OF NATIVE HEART WITHOUT ANGINA PECTORIS: Chronic | ICD-10-CM

## 2024-04-11 DIAGNOSIS — I50.20 HFREF (HEART FAILURE WITH REDUCED EJECTION FRACTION): Primary | ICD-10-CM

## 2024-04-11 RX ORDER — DAPAGLIFLOZIN 10 MG/1
10 TABLET, FILM COATED ORAL DAILY
Qty: 30 TABLET | Refills: 1 | Status: SHIPPED | OUTPATIENT
Start: 2024-04-11

## 2024-04-11 RX ORDER — SACUBITRIL AND VALSARTAN 24; 26 MG/1; MG/1
1 TABLET, FILM COATED ORAL 2 TIMES DAILY
Qty: 60 TABLET | Refills: 1 | Status: SHIPPED | OUTPATIENT
Start: 2024-04-11

## 2024-04-11 NOTE — PROGRESS NOTES
"Chief Complaint  Chest Pain (Patient denies chest pain or shortness of breath since inpatient stay )    Subjective          Dave Townsend presents to [facility] for follow up.    History of Present Illness  Mr. Townsend presents for follow-up of ASCVD, hypertension, hyperlipidemia, and HFrEF.  He is a new patient to our office after having been initially seen in the emergency room.  He underwent invasive coronary angiogram 03/26/2024 and received stents to the RCA and the posterolateral branch.  He was also found to have decreased ejection fraction.  He reports that he has been compliant with dual antiplatelet therapy.  He is not experiencing any troublesome side effects.    His lipids were not at goal when he was in the hospital.  We will continue to monitor and adjust as indicated    His blood pressure is not quite at goal.  As he plans to quit smoking, we anticipate a resultant decrease in his blood pressure and will defer medication adjustment at this time.    We discussed the relationship between coronary artery disease and cigarette smoking.More than 3 minutes but less than 10 was spent in counseling regarding smoking cessation.  Discussed motivation for quitting, previous attempts, and the options for pharmacological support.  He would like to try Chantix.  Quit approaches discussed and he will do the reduce by half beginning 05/01/24.  He is currently smoking 1/2 pack/day and so will decrease to an average of 5 cigarettes daily.  Plan to follow-up in 1 month.    Tobacco Use: High Risk (4/28/2024)    Patient History     Smoking Tobacco Use: Every Day     Smokeless Tobacco Use: Never     Passive Exposure: Never       Objective     Vital Signs:   /91 (BP Location: Left arm, Patient Position: Sitting, Cuff Size: Adult)   Pulse 88   Ht 180.3 cm (71\")   Wt 101 kg (222 lb)   SpO2 97%   BMI 30.96 kg/m²       Physical Exam  Vitals and nursing note reviewed. "   Constitutional:       General: He is not in acute distress.     Appearance: He is obese.   HENT:      Head: Normocephalic and atraumatic.   Eyes:      Conjunctiva/sclera: Conjunctivae normal.   Neck:      Vascular: No carotid bruit.   Cardiovascular:      Rate and Rhythm: Normal rate and regular rhythm.      Pulses: Normal pulses.   Pulmonary:      Effort: Pulmonary effort is normal.      Breath sounds: Normal breath sounds.   Musculoskeletal:      Cervical back: Neck supple.      Right lower leg: No edema.      Left lower leg: No edema.   Skin:     General: Skin is warm and dry.   Neurological:      General: No focal deficit present.   Psychiatric:         Mood and Affect: Mood normal.         Behavior: Behavior normal.          Result Review :   The following data was reviewed by: MARITA Juarez on 04/11/2024:  Common labs          3/27/2024    01:07   Common Labs   Glucose 135    BUN 15    Creatinine 1.07    Sodium 138    Potassium 4.7    Chloride 105    Calcium 8.8    WBC 10.78    Hemoglobin 13.7    Hematocrit 42.8    Platelets 199    Total Cholesterol 184    Triglycerides 136    HDL Cholesterol 37    LDL Cholesterol  122    Hemoglobin A1C 5.70      Lipid Panel          3/27/2024    01:07   Lipid Panel   Total Cholesterol 184    Triglycerides 136    HDL Cholesterol 37    VLDL Cholesterol 25    LDL Cholesterol  122    LDL/HDL Ratio 3.24      Data reviewed : Cardiology studies as detailed below      Last Cardiac Cath  Results for orders placed during the hospital encounter of 03/26/24    Cardiac Catheterization/Vascular Study    Conclusion    Mid RCA-2 lesion is 60% stenosed.    Dist RCA lesion is 100% stenosed.    RPAV lesion is 95% stenosed.    1.  Cardiac.  Patient with acute coronary syndrome involving the right coronary artery with 100% stenosis prior to intervention.  PTCA and stent placement to the right coronary artery done.  Angioplasty and stent of the posterolateral branch done also      Last  Stress test       Last Echo  Results for orders placed during the hospital encounter of 03/26/24    Adult Transthoracic Echo Complete W/ Cont if Necessary Per Protocol    Interpretation Summary    Left ventricular systolic function is moderately decreased. Estimated left ventricular EF = 30% Left ventricular ejection fraction appears to be 26 - 30%.    The left ventricular cavity is mild to moderately dilated.    Left ventricular diastolic function was normal.             Current Outpatient Medications   Medication Sig Dispense Refill    aspirin 81 MG EC tablet Take 1 tablet by mouth Daily. 30 tablet 1    atorvastatin (LIPITOR) 40 MG tablet Take 1 tablet by mouth Every Night. 30 tablet 1    metoprolol succinate XL (TOPROL-XL) 25 MG 24 hr tablet Take 1/2 tablet by mouth Daily. 30 tablet 1    nicotine (NICODERM CQ) 21 MG/24HR patch Place 1 patch on the skin as directed by provider Daily. 30 patch 0    nitroglycerin (NITROSTAT) 0.4 MG SL tablet Place 1 tablet under the tongue Every 5 minutes as Needed for Chest Pain (Systolic BP Greater Than 100). Take no more than 3 doses in 15 minutes. 25 tablet 0    ticagrelor (BRILINTA) 90 MG tablet tablet Take 1 tablet by mouth 2 (Two) Times a Day. 60 tablet 1    dapagliflozin Propanediol 10 MG tablet Take 10 mg by mouth Daily. 30 tablet 1    sacubitril-valsartan (Entresto) 24-26 MG tablet Take 1 tablet by mouth 2 (Two) Times a Day. 60 tablet 1    [START ON 5/2/2024] varenicline (CHANTIX) 1 MG tablet Take 1 tablet by mouth 2 (Two) Times a Day for 140 days. (Patient not taking: Reported on 4/11/2024) 56 tablet 4    Varenicline Tartrate, Starter, 0.5 MG X 11 & 1 MG X 42 tablet therapy pack Take 0.5 mg by mouth Daily for 3 days, THEN 0.5 mg 2 (Two) Times a Day for 4 days, THEN 1 mg 2 (Two) Times a Day for 21 days. Take 0.5 mg po daily x 3 days, then 0.5 mg po bid x 4 days, then 1 mg po bid (Patient not taking: Reported on 4/11/2024) 1 each 0     No current facility-administered  medications for this visit.            Assessment and Plan    Problem List Items Addressed This Visit       Essential hypertension (Chronic)    Mixed hyperlipidemia (Chronic)    Overview     3/27/2024-total cholesterol 184, triglycerides 136, HDL 37,          Smoker    HFrEF (heart failure with reduced ejection fraction) - Primary (Chronic)    Overview     03/26/2024-LVEF 26 to 30%         Relevant Medications    dapagliflozin Propanediol 10 MG tablet    sacubitril-valsartan (Entresto) 24-26 MG tablet    Other Relevant Orders    [facility] Wearable Cardioverter-Defibrillator    Coronary artery disease involving native coronary artery of native heart without angina pectoris (Chronic)    Overview     03/26/[id number] of MICHAEL to RCA and posterolateral         Relevant Medications    sacubitril-valsartan (Entresto) 24-26 MG tablet     Diagnoses and all orders for this visit:    1. HFrEF (heart failure with reduced ejection fraction) (Primary)  -     dapagliflozin Propanediol 10 MG tablet; Take 10 mg by mouth Daily.  Dispense: 30 tablet; Refill: 1  -     sacubitril-valsartan (Entresto) 24-26 MG tablet; Take 1 tablet by mouth 2 (Two) Times a Day.  Dispense: 60 tablet; Refill: 1  -     [facility] Wearable Cardioverter-Defibrillator    2. Smoker    3. Coronary artery disease involving native coronary artery of native heart without angina pectoris    4. Mixed hyperlipidemia    5. Essential hypertension            Follow Up     Return in about 4 weeks (around 5/9/2024).    Patient was given instructions and counseling regarding his condition or for health maintenance advice. Please see specific information pulled into the AVS if appropriate.       Electronically signed by MARITA Juarez, 04/28/24, 11:20 AM EDT.      Dictated Utilizing Dragon Dictation: Part of this note may be an electronic transcription/translation of spoken language to printed text using the Dragon Dictation System

## 2024-04-28 PROBLEM — I25.10 CORONARY ARTERY DISEASE INVOLVING NATIVE CORONARY ARTERY OF NATIVE HEART WITHOUT ANGINA PECTORIS: Chronic | Status: ACTIVE | Noted: 2024-04-28

## 2024-04-28 PROBLEM — I50.20 HFREF (HEART FAILURE WITH REDUCED EJECTION FRACTION): Chronic | Status: ACTIVE | Noted: 2024-04-04

## 2024-04-28 PROBLEM — E78.2 MIXED HYPERLIPIDEMIA: Chronic | Status: ACTIVE | Noted: 2024-04-04

## 2024-04-28 PROBLEM — I10 ESSENTIAL HYPERTENSION: Chronic | Status: ACTIVE | Noted: 2024-04-04

## 2024-04-28 PROBLEM — I21.3 STEMI (ST ELEVATION MYOCARDIAL INFARCTION): Status: RESOLVED | Noted: 2024-03-26 | Resolved: 2024-04-28

## 2024-04-28 PROBLEM — I25.10 CORONARY ARTERY DISEASE INVOLVING NATIVE CORONARY ARTERY OF NATIVE HEART WITHOUT ANGINA PECTORIS: Status: ACTIVE | Noted: 2024-04-28

## 2024-05-02 ENCOUNTER — TELEPHONE (OUTPATIENT)
Dept: CARDIOLOGY | Facility: CLINIC | Age: 59
End: 2024-05-02
Payer: MEDICAID

## 2024-05-02 NOTE — TELEPHONE ENCOUNTER
TRYING TO CALL PATIENT TO MOVE HIS APPT. TIME UP TO AN EARLIER TIME ON THE SAME DATE. PROVIDER IS OUT OF OFFICE AFTER LUNCH ON THIS DATE.

## 2024-05-09 ENCOUNTER — OFFICE VISIT (OUTPATIENT)
Dept: CARDIOLOGY | Facility: CLINIC | Age: 59
End: 2024-05-09
Payer: MEDICAID

## 2024-05-09 VITALS
WEIGHT: 222.4 LBS | BODY MASS INDEX: 31.14 KG/M2 | HEIGHT: 71 IN | OXYGEN SATURATION: 97 % | SYSTOLIC BLOOD PRESSURE: 130 MMHG | DIASTOLIC BLOOD PRESSURE: 83 MMHG | HEART RATE: 80 BPM

## 2024-05-09 DIAGNOSIS — I50.20 HFREF (HEART FAILURE WITH REDUCED EJECTION FRACTION): Primary | Chronic | ICD-10-CM

## 2024-05-09 DIAGNOSIS — I25.10 CORONARY ARTERY DISEASE INVOLVING NATIVE CORONARY ARTERY OF NATIVE HEART WITHOUT ANGINA PECTORIS: Chronic | ICD-10-CM

## 2024-05-09 DIAGNOSIS — E78.2 MIXED HYPERLIPIDEMIA: Chronic | ICD-10-CM

## 2024-05-09 DIAGNOSIS — I10 ESSENTIAL HYPERTENSION: Chronic | ICD-10-CM

## 2024-05-09 RX ORDER — SACUBITRIL AND VALSARTAN 24; 26 MG/1; MG/1
1 TABLET, FILM COATED ORAL 2 TIMES DAILY
Qty: 56 TABLET | Refills: 0 | COMMUNITY
Start: 2024-05-09

## 2024-05-09 RX ORDER — SPIRONOLACTONE 25 MG/1
12.5 TABLET ORAL DAILY
Qty: 30 TABLET | Refills: 6 | Status: SHIPPED | OUTPATIENT
Start: 2024-05-09

## 2024-05-09 RX ORDER — DAPAGLIFLOZIN 10 MG/1
10 TABLET, FILM COATED ORAL DAILY
Qty: 28 TABLET | Refills: 0 | COMMUNITY
Start: 2024-05-09

## 2024-05-20 ENCOUNTER — HOSPITAL ENCOUNTER (OUTPATIENT)
Dept: CARDIOLOGY | Facility: HOSPITAL | Age: 59
Discharge: HOME | End: 2024-05-20
Admitting: PHYSICIAN ASSISTANT
Payer: MEDICAID

## 2024-05-20 VITALS
HEIGHT: 71 IN | BODY MASS INDEX: 31.08 KG/M2 | OXYGEN SATURATION: 96 % | HEART RATE: 77 BPM | DIASTOLIC BLOOD PRESSURE: 76 MMHG | SYSTOLIC BLOOD PRESSURE: 121 MMHG | WEIGHT: 222 LBS

## 2024-05-20 DIAGNOSIS — I50.20 HFREF (HEART FAILURE WITH REDUCED EJECTION FRACTION): Primary | Chronic | ICD-10-CM

## 2024-05-20 DIAGNOSIS — F17.200 SMOKER: ICD-10-CM

## 2024-05-20 DIAGNOSIS — I10 ESSENTIAL HYPERTENSION: Chronic | ICD-10-CM

## 2024-05-20 DIAGNOSIS — I25.10 ASCVD (ARTERIOSCLEROTIC CARDIOVASCULAR DISEASE): ICD-10-CM

## 2024-05-20 DIAGNOSIS — Z72.0 TOBACCO ABUSE: ICD-10-CM

## 2024-05-20 LAB
ABSOLUTE LUNG FLUID CONTENT: 34 % (ref 20–35)
ANION GAP SERPL CALCULATED.3IONS-SCNC: 9.6 MMOL/L (ref 5–15)
BUN SERPL-MCNC: 22 MG/DL (ref 6–20)
BUN/CREAT SERPL: 18.2 (ref 7–25)
CALCIUM SPEC-SCNC: 9.5 MG/DL (ref 8.6–10.5)
CHLORIDE SERPL-SCNC: 108 MMOL/L (ref 98–107)
CO2 SERPL-SCNC: 20.4 MMOL/L (ref 22–29)
CREAT SERPL-MCNC: 1.21 MG/DL (ref 0.76–1.27)
EGFRCR SERPLBLD CKD-EPI 2021: 70.3 ML/MIN/1.73
GLUCOSE SERPL-MCNC: 107 MG/DL (ref 65–99)
MAGNESIUM SERPL-MCNC: 2.2 MG/DL (ref 1.6–2.6)
NT-PROBNP SERPL-MCNC: 194 PG/ML (ref 0–900)
POTASSIUM SERPL-SCNC: 4.4 MMOL/L (ref 3.5–5.2)
SODIUM SERPL-SCNC: 138 MMOL/L (ref 136–145)

## 2024-05-20 PROCEDURE — 83880 ASSAY OF NATRIURETIC PEPTIDE: CPT | Performed by: PHYSICIAN ASSISTANT

## 2024-05-20 PROCEDURE — 83735 ASSAY OF MAGNESIUM: CPT | Performed by: PHYSICIAN ASSISTANT

## 2024-05-20 PROCEDURE — 36415 COLL VENOUS BLD VENIPUNCTURE: CPT | Performed by: PHYSICIAN ASSISTANT

## 2024-05-20 PROCEDURE — 94726 PLETHYSMOGRAPHY LUNG VOLUMES: CPT | Performed by: PHYSICIAN ASSISTANT

## 2024-05-20 PROCEDURE — 80048 BASIC METABOLIC PNL TOTAL CA: CPT | Performed by: PHYSICIAN ASSISTANT

## 2024-05-20 RX ORDER — VARENICLINE TARTRATE 1 MG/1
1 TABLET, FILM COATED ORAL 2 TIMES DAILY
Qty: 56 TABLET | Refills: 1 | Status: SHIPPED | OUTPATIENT
Start: 2024-06-17 | End: 2024-08-12

## 2024-05-20 RX ORDER — SPIRONOLACTONE 25 MG/1
12.5 TABLET ORAL DAILY
Qty: 90 TABLET | Refills: 0 | Status: SHIPPED | OUTPATIENT
Start: 2024-05-20 | End: 2024-11-16

## 2024-05-20 RX ORDER — ATORVASTATIN CALCIUM 40 MG/1
40 TABLET, FILM COATED ORAL NIGHTLY
Qty: 90 TABLET | Refills: 0 | Status: SHIPPED | OUTPATIENT
Start: 2024-05-20 | End: 2024-08-18

## 2024-05-20 RX ORDER — VARENICLINE TARTRATE 0.5 (11)-1
KIT ORAL
Qty: 53 EACH | Refills: 0 | Status: SHIPPED | OUTPATIENT
Start: 2024-05-20 | End: 2024-06-17

## 2024-05-20 RX ORDER — METOPROLOL SUCCINATE 25 MG/1
12.5 TABLET, EXTENDED RELEASE ORAL
Qty: 45 TABLET | Refills: 0 | Status: SHIPPED | OUTPATIENT
Start: 2024-05-20 | End: 2024-08-18

## 2024-05-20 RX ORDER — SACUBITRIL AND VALSARTAN 24; 26 MG/1; MG/1
1 TABLET, FILM COATED ORAL 2 TIMES DAILY
Qty: 180 TABLET | Refills: 1 | Status: SHIPPED | OUTPATIENT
Start: 2024-05-20 | End: 2024-08-18

## 2024-05-20 RX ORDER — VERICIGUAT 10 MG/1
10 TABLET, FILM COATED ORAL DAILY
Qty: 90 TABLET | Refills: 3 | Status: SHIPPED | OUTPATIENT
Start: 2024-06-03 | End: 2025-06-03

## 2024-05-20 RX ORDER — DAPAGLIFLOZIN 10 MG/1
10 TABLET, FILM COATED ORAL DAILY
Qty: 90 TABLET | Refills: 0 | Status: SHIPPED | OUTPATIENT
Start: 2024-05-20 | End: 2024-08-18

## 2024-05-20 NOTE — PROGRESS NOTES
" [facility]  Pharmacist Note     Dave Townsend is a 58 y.o. male seen in the [facility] for HFrEF for most EF of 26-30% on 3/28/24. Patient was referred to us by cardiology since he could not afford Farxiga. Cardiology has given him samples of Entresto and Farxiga.     Dave Townsend reports a poor understanding of medications, but reports adherence to his doses since being discharged in March. He brought in his medication bottles with him today.     He reports that he checks his BP daily and it runs \"good\" for the most part. He denies any episodes of lightheadedness.     He denies any swelling or SOB and is currently only on 12.5mg of Spironolactone at this time.     He reports that he only has $20 in his pocket and currently does not have an income. He has money because his sister is kind and gives him some. He has applied for disability and therefore would love some assistance with his copays.       Medication Use:   Hx of med intolerances:  None related to HF  Retail Rx Management: [facility] lives in [place]- cannot mail; want 90 day supplies!- focusing today 5/20/24- re-evlauate need    Past Medical History:   Diagnosis Date    Abnormal ECG     Hypertension     STEMI (ST elevation myocardial infarction) 03/26/2024     ALLERGIES: Patient has no known allergies.  Current Outpatient Medications   Medication Sig Dispense Refill    aspirin 81 MG EC tablet Take 1 tablet by mouth Daily. 30 tablet 1    atorvastatin (LIPITOR) 40 MG tablet Take 1 tablet by mouth Every Night for 90 days. 90 tablet 0    dapagliflozin Propanediol 10 MG tablet Take 10 mg by mouth Daily for 90 days. 90 tablet 0    metoprolol succinate XL (TOPROL-XL) 25 MG 24 hr tablet Take 1/2 tablet by mouth Daily. 45 tablet 0    nicotine (NICODERM CQ) 21 MG/24HR patch Place 1 patch on the skin as directed by provider Daily. 30 patch 0    nitroglycerin (NITROSTAT) 0.4 MG SL tablet Place 1 tablet under the tongue Every 5 minutes as Needed for " "Chest Pain (Systolic BP Greater Than 100). Take no more than 3 doses in 15 minutes. 25 tablet 0    sacubitril-valsartan (Entresto) 24-26 MG tablet Take 1 tablet by mouth 2 (Two) Times a Day for 90 days. 180 tablet 1    spironolactone (ALDACTONE) 25 MG tablet Take ½ tablet by mouth Daily. 90 tablet 0    ticagrelor (BRILINTA) 90 MG tablet tablet Take 1 tablet by mouth 2 (Two) Times a Day. 180 each 3    [START ON 6/17/2024] varenicline (Chantix Continuing Month Farrukh) 1 MG tablet Take 1 tablet by mouth 2 (Two) Times a Day for 56 days. 56 tablet 1    varenicline (CHANTIX) 1 MG tablet Take 1 tablet by mouth 2 (Two) Times a Day for 140 days. (Patient not taking: Reported on 4/11/2024) 56 tablet 4    Varenicline Tartrate, Starter, 0.5 MG X 11 & 1 MG X 42 tablet therapy pack Take 0.5 mg by mouth Daily for 3 days, THEN 0.5 mg 2 (Two) Times a Day for 4 days, THEN 1 mg 2 (Two) Times a Day for 21 days. 53 each 0    [START ON 6/3/2024] Vericiguat (Verquvo) 10 MG tablet Take 1 tablet by mouth Daily. 90 tablet 3    Vericiguat 2.5 MG tablet Take 1 tablet by mouth Daily for 14 days. 14 tablet 0    [START ON 6/3/2024] Vericiguat 5 MG tablet Take 1 tablet by mouth Daily for 14 days. 14 tablet 0     No current facility-administered medications for this encounter.       Vaccination History:   Pneumonia: Interested in   Annual Influenza: Usually does not get- see during next flu season  Shingles: Maybe interested in     Objective  Vitals:    05/20/24 1305   BP: 121/76   BP Location: Left arm   Patient Position: Sitting   Cuff Size: Adult   Pulse: 77   SpO2: 96%   Weight: 101 kg (222 lb)   Height: 180.3 cm (71\")     Wt Readings from Last 3 Encounters:   05/20/24 101 kg (222 lb)   05/09/24 101 kg (222 lb 6.4 oz)   04/11/24 101 kg (222 lb)         05/20/24  1305   Weight: 101 kg (222 lb)     Lab Results   Component Value Date    GLUCOSE 107 (H) 05/20/2024    BUN 22 (H) 05/20/2024    CREATININE 1.21 05/20/2024    BCR 18.2 05/20/2024    K 4.4 " 05/20/2024    CO2 20.4 (L) 05/20/2024    CALCIUM 9.5 05/20/2024     Lab Results   Component Value Date    WBC 10.78 03/27/2024    HGB 13.7 03/27/2024    HCT 42.8 03/27/2024    MCV 86.3 03/27/2024     03/27/2024     Lab Results   Component Value Date    TROPONINT 2,243 (C) 03/28/2024     Lab Results   Component Value Date    PROBNP 194.0 05/20/2024     Results for orders placed during the hospital encounter of 03/26/24    Adult Transthoracic Echo Complete W/ Cont if Necessary Per Protocol    Interpretation Summary    Left ventricular systolic function is moderately decreased. Estimated left ventricular EF = 30% Left ventricular ejection fraction appears to be 26 - 30%.    The left ventricular cavity is mild to moderately dilated.    Left ventricular diastolic function was normal.         GDMT    Drug Class   Drug   Dose Last Dose Adjustment Additional Titration   Notes   ACEi/ARB/ARNI Entresto 24/26mg 4/28/24 Katina     Beta Blocker Toprol 12.5mg ~ 3/2024     MRA Spironolactone 12.5mg 5/9/24 Katina     SGLT2i Farxiga 10mg 4/28/24  Katina      Verquvo 2.5-5-10mg 5/20/24         Drug Therapy Problems    1. Drug Interactions Screening  2. Drug-Disease Interactions: NSAIDS  3. Financial assistance?  4. GDMT    Recommendations:     No significant interactions  2.  Patient reports that he will take 3 aspirins if needed and it takes care of any of his issues. I counseled the patient against this use and the use of IBU. I recommended that he use Tylenol as needed and patient reports that it does not help anything.   3. Patient reports that he only has $20 because his sister gave him some money. He currently does not have a job and has applied for disability. Approved for focus- will try to fill 90 day supplies for patient to be filled today since we cannot mail to him where he lives in [place]. Re-evaluate if focus is needed- applying for disability. Filled everything we could today for 90 day supplies.   4. Julee to  start Verquvo. Gave 2 weeks of samples of the 2.5mg and 5mg and sent in Rx for 10mg.       Patient was educated on heart failure medications and the importance of medication adherence. All questions were addressed and patient expressed understanding. Used teach-back method to assess understanding.     Thank you for allowing me to participate in the care of your patient,    Rhina Sandra [facility]  05/20/24  15:45 EDT

## 2024-05-20 NOTE — PROGRESS NOTES
Heart Failure Clinic    Date: 05/20/24     Vitals:    05/20/24 1305   BP: 121/76   Pulse: 77   SpO2: 96%      Weight 222  Method of arrival: Ambulatory    Weighing self daily: No    Monitoring Heart Failure Zones: No    Today's HF Zone: Reviewed Zones    Taking medications as prescribed: Yes    Edema No    Shortness of Air: No    Number of pillows used at night:>3    Educational Materials given:  AVS, Heart Success patient book                                                                         ReDS Value: 34  25-35 Optimal Value Status      Kim Epperson RN 05/20/24 13:08 EDT

## 2024-05-20 NOTE — PROGRESS NOTES
[facility]  MEG Godoy Lori A, APRN  [address]  [facility] [address]  [place] [address]    Thank you for asking me to see Dave Townsend for congestive heart failure.    HPI:     This is a 58 y.o. male with known past medical history of:    HFrEF  TTE from  ASCVD status post STEMI in March 2024  [facility] in March 2024 PCI drug-eluting stent to the RCA and 2 stents to the posterolateral branch  Essential hypertension  Hyperlipidemia    Dave Townsend presents for today for heart failure clinic evaluation.  The patient is typically seen by Claudio Jane MD.  Patient's primary cardiologist is Dr. Mendosa.     Last known EF 26-30%.   Last known hospitalization and/or ED visit: She was hospitalized from March 26 through March 28, 2024 with STEMI which time patient underwent left heart catheterization with PCI drug-eluting stent to the RCA and 2 stents to the posterolateral branch  Accompanied by: Self          05/20/24 visit data/details regarding:   Dyspnea: Denies  Lower extremity swelling: Improved  Abdominal swelling: Denies  Home weight: Weight monitoring booklet provided during initial visit; Has scale.   Home BP: BP monitoring booklet provided during initial visit; Has BP cuff.   Home heart rate: HR monitoring booklet provided during initial visit.  Daily activities of living: Performing on his own  Pillows/lying flat: 3 pillows in bed   HF zone: Green  Patient presents today for evaluation after referral from Katina Pak.   He did report some difficulty affording his heart failure medications given extent of Farxiga cost.  He was reportedly compliant with LifeVest during evaluation.   Mr. Townsend reports initial fatigue after STEMI but states he has gradually had more energy since that time.   He reports the biggest ángela in his trek with HFrEF has been medication costs.   He reports he is attempting to stop smoking and is down from 2 packs daily to 1  pack daily.  He reports he continues to attempt to stop and is wearing nicotine patches daily.  He reports he would like to try Chantix but had prior affordability issues with the medication and his insurance.             Review of Systems - Review of Systems   Constitutional: Negative for decreased appetite and diaphoresis.   HENT:  Negative for congestion and ear pain.    Eyes:  Negative for blurred vision, double vision and pain.   Cardiovascular:  Negative for chest pain, claudication and dyspnea on exertion.   Respiratory:  Positive for shortness of breath. Negative for cough.    Endocrine: Negative for cold intolerance and heat intolerance.   Hematologic/Lymphatic: Negative for adenopathy and bleeding problem.   Skin:  Negative for dry skin and nail changes.   Musculoskeletal:  Negative for arthritis and falls.   Gastrointestinal:  Negative for bloating and anorexia.   Genitourinary:  Negative for bladder incontinence and dysuria.   Neurological:  Negative for aphonia and difficulty with concentration.   Psychiatric/Behavioral:  Negative for altered mental status and hallucinations.          All other systems were reviewed and were negative.    Patient Active Problem List   Diagnosis    Essential hypertension    Mixed hyperlipidemia    Prediabetes    Smoker    Mixed type COPD (chronic obstructive pulmonary disease)    Healthcare maintenance    HFrEF (heart failure with reduced ejection fraction)    Coronary artery disease involving native coronary artery of native heart without angina pectoris       family history includes Heart disease in his mother.     reports that he has been smoking cigarettes. He has never been exposed to tobacco smoke. His smokeless tobacco use includes snuff. He reports that he does not drink alcohol and does not use drugs.    No Known Allergies      Current Outpatient Medications:     aspirin 81 MG EC tablet, Take 1 tablet by mouth Daily., Disp: 30 tablet, Rfl: 1    atorvastatin  (LIPITOR) 40 MG tablet, Take 1 tablet by mouth Every Night for 90 days., Disp: 90 tablet, Rfl: 0    dapagliflozin Propanediol 10 MG tablet, Take 10 mg by mouth Daily for 90 days., Disp: 90 tablet, Rfl: 0    metoprolol succinate XL (TOPROL-XL) 25 MG 24 hr tablet, Take 1/2 tablet by mouth Daily., Disp: 45 tablet, Rfl: 0    nicotine (NICODERM CQ) 21 MG/24HR patch, Place 1 patch on the skin as directed by provider Daily., Disp: 30 patch, Rfl: 0    nitroglycerin (NITROSTAT) 0.4 MG SL tablet, Place 1 tablet under the tongue Every 5 minutes as Needed for Chest Pain (Systolic BP Greater Than 100). Take no more than 3 doses in 15 minutes., Disp: 25 tablet, Rfl: 0    sacubitril-valsartan (Entresto) 24-26 MG tablet, Take 1 tablet by mouth 2 (Two) Times a Day for 90 days., Disp: 180 tablet, Rfl: 1    spironolactone (ALDACTONE) 25 MG tablet, Take ½ tablet by mouth Daily., Disp: 90 tablet, Rfl: 0    ticagrelor (BRILINTA) 90 MG tablet tablet, Take 1 tablet by mouth 2 (Two) Times a Day., Disp: 180 each, Rfl: 3    [START ON 6/17/2024] varenicline (Chantix Continuing Month Farrukh) 1 MG tablet, Take 1 tablet by mouth 2 (Two) Times a Day for 56 days., Disp: 56 tablet, Rfl: 1    varenicline (CHANTIX) 1 MG tablet, Take 1 tablet by mouth 2 (Two) Times a Day for 140 days. (Patient not taking: Reported on 4/11/2024), Disp: 56 tablet, Rfl: 4    Varenicline Tartrate, Starter, 0.5 MG X 11 & 1 MG X 42 tablet therapy pack, Take 0.5 mg by mouth Daily for 3 days, THEN 0.5 mg 2 (Two) Times a Day for 4 days, THEN 1 mg 2 (Two) Times a Day for 21 days. Take 0.5 mg po daily x 3 days, then 0.5 mg po twice daily x 4 days, then 1 mg po twice daily, Disp: 53 each, Rfl: 0    [START ON 6/3/2024] Vericiguat (Verquvo) 10 MG tablet, Take 1 tablet by mouth Daily., Disp: 90 tablet, Rfl: 3    Vericiguat 2.5 MG tablet, Take 1 tablet by mouth Daily for 14 days., Disp: 14 tablet, Rfl: 0    [START ON 6/3/2024] Vericiguat 5 MG tablet, Take 1 tablet by mouth Daily for 14  days., Disp: 14 tablet, Rfl: 0      Physical Exam:  I have reviewed the patient's current vital signs as documented in the patient's EMR.   Vitals:    05/20/24 1305   BP: 121/76   Pulse: 77   SpO2: 96%     Body mass index is 30.96 kg/m².       05/20/24  1305   Weight: 101 kg (222 lb)      Physical Exam  Vitals and nursing note reviewed.   Constitutional:       General: He is awake.      Appearance: Normal appearance. He is well-developed and well-groomed.   HENT:      Head: Normocephalic and atraumatic.   Eyes:      General: Lids are normal.      Conjunctiva/sclera:      Right eye: Right conjunctiva is not injected.      Left eye: Left conjunctiva is not injected.   Cardiovascular:      Rate and Rhythm: Normal rate and regular rhythm.      Heart sounds:      No S3 or S4 sounds.   Pulmonary:      Effort: No tachypnea or bradypnea.      Breath sounds: No decreased breath sounds, wheezing, rhonchi or rales.   Abdominal:      General: Bowel sounds are normal.      Palpations: Abdomen is soft.      Tenderness: There is no abdominal tenderness.   Musculoskeletal:      Right lower leg: No edema.      Left lower leg: No edema.   Skin:     General: Skin is warm and dry.   Neurological:      Mental Status: He is alert and oriented to person, place, and time.   Psychiatric:         Attention and Perception: Attention normal.         Mood and Affect: Mood normal.         Behavior: Behavior is cooperative.          JVP: Volume/Pulsation: Normal.        DATA REVIEWED:     ---------------------------------------------------  TTE/ROSA:  Results for orders placed during the hospital encounter of 03/26/24    Adult Transthoracic Echo Complete W/ Cont if Necessary Per Protocol    Interpretation Summary    Left ventricular systolic function is moderately decreased. Estimated left ventricular EF = 30% Left ventricular ejection fraction appears to be 26 - 30%.    The left ventricular cavity is mild to moderately dilated.    Left  "ventricular diastolic function was normal.        LAST HEART CATH/IF AVAILABLE:     Results for orders placed during the hospital encounter of 03/26/24    Cardiac Catheterization/Vascular Study    Conclusion    Mid RCA-2 lesion is 60% stenosed.    Dist RCA lesion is 100% stenosed.    RPAV lesion is 95% stenosed.    1.  Cardiac.  Patient with acute coronary syndrome involving the right coronary artery with 100% stenosis prior to intervention.  PTCA and stent placement to the right coronary artery done.  Angioplasty and stent of the posterolateral branch done also      -----------------------------------------------------  CXR/Imaging:   Imaging Results (Most Recent)       None            I personally reviewed and interpreted the CXR.      -----------------------------------------------------  CT:   No radiology results for the last 30 days.  I personally reviewed the images of the CT scan.  My personal interpretation is below.      ----------------------------------------------------    --------------------------------------------------------------------------------------------------    Laboratory evaluations:    Lab Results   Component Value Date    GLUCOSE 107 (H) 05/20/2024    BUN 22 (H) 05/20/2024    CREATININE 1.21 05/20/2024    BCR 18.2 05/20/2024    K 4.4 05/20/2024    CO2 20.4 (L) 05/20/2024    CALCIUM 9.5 05/20/2024     Lab Results   Component Value Date    WBC 10.78 03/27/2024    HGB 13.7 03/27/2024    HCT 42.8 03/27/2024    MCV 86.3 03/27/2024     03/27/2024     Lab Results   Component Value Date    CHOL 184 03/27/2024    TRIG 136 03/27/2024    HDL 37 (L) 03/27/2024     (H) 03/27/2024     No results found for: \"TSH\", \"B0NOMRI\", \"F6DJVOS\", \"THYROIDAB\"  Lab Results   Component Value Date    HGBA1C 5.70 (H) 03/27/2024     No results found for: \"ALT\"  Lab Results   Component Value Date    HGBA1C 5.70 (H) 03/27/2024     Lab Results   Component Value Date    CREATININE 1.21 05/20/2024     No " "results found for: \"IRON\", \"TIBC\", \"FERRITIN\"  No results found for: \"INR\", \"PROTIME\"     Lab Results   Component Value Date    ABSOLUTELUNG 34 05/20/2024       PAH RISK ASSESSMENT:      1. HFrEF (heart failure with reduced ejection fraction)    2. Essential hypertension    3. ASCVD (arteriosclerotic cardiovascular disease)    4. Smoker    5. Tobacco abuse          ORDERS PLACED TODAY:  Orders Placed This Encounter   Procedures    ReDs Vest    Basic Metabolic Panel    Magnesium    proBNP    Basic Metabolic Panel    Magnesium    proBNP        Diagnoses and all orders for this visit:    1. HFrEF (heart failure with reduced ejection fraction) (Primary)  Overview:  03/26/2024-LVEF 26 to 30%    Orders:  -     Basic Metabolic Panel; Future  -     Magnesium; Future  -     proBNP; Future  -     Basic Metabolic Panel; Standing  -     Basic Metabolic Panel  -     Magnesium; Standing  -     Magnesium  -     proBNP; Standing  -     proBNP  -     ReDs Vest  -     dapagliflozin Propanediol 10 MG tablet; Take 10 mg by mouth Daily for 90 days.  Dispense: 90 tablet; Refill: 0  -     sacubitril-valsartan (Entresto) 24-26 MG tablet; Take 1 tablet by mouth 2 (Two) Times a Day for 90 days.  Dispense: 180 tablet; Refill: 1    2. Essential hypertension    3. ASCVD (arteriosclerotic cardiovascular disease)    4. Smoker    5. Tobacco abuse    Other orders  -     atorvastatin (LIPITOR) 40 MG tablet; Take 1 tablet by mouth Every Night for 90 days.  Dispense: 90 tablet; Refill: 0  -     metoprolol succinate XL (TOPROL-XL) 25 MG 24 hr tablet; Take 1/2 tablet by mouth Daily.  Dispense: 45 tablet; Refill: 0  -     ticagrelor (BRILINTA) 90 MG tablet tablet; Take 1 tablet by mouth 2 (Two) Times a Day.  Dispense: 180 each; Refill: 3  -     Varenicline Tartrate, Starter, 0.5 MG X 11 & 1 MG X 42 tablet therapy pack; Take 0.5 mg by mouth Daily for 3 days, THEN 0.5 mg 2 (Two) Times a Day for 4 days, THEN 1 mg 2 (Two) Times a Day for 21 days. Take 0.5 mg " po daily x 3 days, then 0.5 mg po twice daily x 4 days, then 1 mg po twice daily  Dispense: 53 each; Refill: 0  -     varenicline (Chantix Continuing Month Farrukh) 1 MG tablet; Take 1 tablet by mouth 2 (Two) Times a Day for 56 days.  Dispense: 56 tablet; Refill: 1  -     spironolactone (ALDACTONE) 25 MG tablet; Take ½ tablet by mouth Daily.  Dispense: 90 tablet; Refill: 0  -     Vericiguat 2.5 MG tablet; Take 1 tablet by mouth Daily for 14 days.  Dispense: 14 tablet; Refill: 0  -     Vericiguat 5 MG tablet; Take 1 tablet by mouth Daily for 14 days.  Dispense: 14 tablet; Refill: 0  -     Vericiguat (Verquvo) 10 MG tablet; Take 1 tablet by mouth Daily.  Dispense: 90 tablet; Refill: 3             MEDS ORDERED TODAY:    New Medications Ordered This Visit   Medications    atorvastatin (LIPITOR) 40 MG tablet     Sig: Take 1 tablet by mouth Every Night for 90 days.     Dispense:  90 tablet     Refill:  0    dapagliflozin Propanediol 10 MG tablet     Sig: Take 10 mg by mouth Daily for 90 days.     Dispense:  90 tablet     Refill:  0     Order Specific Question:   Lot Number?     Answer:   [id number]     Order Specific Question:   Expiration Date?     Answer:   10/31/2026     Order Specific Question:   Quantity     Answer:   28    metoprolol succinate XL (TOPROL-XL) 25 MG 24 hr tablet     Sig: Take 1/2 tablet by mouth Daily.     Dispense:  45 tablet     Refill:  0    sacubitril-valsartan (Entresto) 24-26 MG tablet     Sig: Take 1 tablet by mouth 2 (Two) Times a Day for 90 days.     Dispense:  180 tablet     Refill:  1     Order Specific Question:   Lot Number?     Answer:   [id number]     Order Specific Question:   Expiration Date?     Answer:   5/30/2026     Order Specific Question:   Quantity     Answer:   56    ticagrelor (BRILINTA) 90 MG tablet tablet     Sig: Take 1 tablet by mouth 2 (Two) Times a Day.     Dispense:  180 each     Refill:  3    Varenicline Tartrate, Starter, 0.5 MG X 11 & 1 MG X 42 tablet therapy pack      Sig: Take 0.5 mg by mouth Daily for 3 days, THEN 0.5 mg 2 (Two) Times a Day for 4 days, THEN 1 mg 2 (Two) Times a Day for 21 days. Take 0.5 mg po daily x 3 days, then 0.5 mg po twice daily x 4 days, then 1 mg po twice daily     Dispense:  53 each     Refill:  0    varenicline (Chantix Continuing Month Farrukh) 1 MG tablet     Sig: Take 1 tablet by mouth 2 (Two) Times a Day for 56 days.     Dispense:  56 tablet     Refill:  1    spironolactone (ALDACTONE) 25 MG tablet     Sig: Take ½ tablet by mouth Daily.     Dispense:  90 tablet     Refill:  0    Vericiguat 2.5 MG tablet     Sig: Take 1 tablet by mouth Daily for 14 days.     Dispense:  14 tablet     Refill:  0    Vericiguat 5 MG tablet     Sig: Take 1 tablet by mouth Daily for 14 days.     Dispense:  14 tablet     Refill:  0    Vericiguat (Verquvo) 10 MG tablet     Sig: Take 1 tablet by mouth Daily.     Dispense:  90 tablet     Refill:  3        ---------------------------------------------------------------------------------------------------------------------------          ASSESSMENT/PLAN:      Diagnosis Plan   1. HFrEF (heart failure with reduced ejection fraction)  Basic Metabolic Panel    Magnesium    proBNP    Basic Metabolic Panel    Basic Metabolic Panel    Magnesium    Magnesium    proBNP    proBNP    ReDs Vest    dapagliflozin Propanediol 10 MG tablet    sacubitril-valsartan (Entresto) 24-26 MG tablet      2. Essential hypertension        3. ASCVD (arteriosclerotic cardiovascular disease)        4. Smoker        5. Tobacco abuse            not acutely decompensated chronic moderate systolic and diastolic heart failure. CHF. Etiology: Ischemic/CAD (Hx of AMI, CAD, or Ischemic Cardiomyopathy).     NYHA stage Stage C: Structural heart disease is present AND symptoms have occurredFC-Class III: Marked limitation of physical activity. Comfortable at rest. Less than ordinary activity causes fatigue, palpitation, or dyspnea.     Today, Patient is approaching  euvolemiaand with  Moderate perfusion. The patient's hemodynamics are currently acceptable. HR is: normal and is at goal. BP/MAP was reviewed and there isroom for medication up-titration.  Clinical trajectory was assessed and hasimproved.     CHF GOAL DIRECTED MEDICAL THERAPY FOR PATIENT ADDRESSED/ADJUSTED:     GDMT: HFrEF    Drug Class   Drug   Dose Last Dose Adjustment Notes   ACEi/ARB/ARNI Entresto 24-26mg BID     Beta Blocker Toprol XL       MRA Spironolactone 12.5mg qd     SGLT2i Farxiga 10mg  N/A   Secondaries if applicable:  Start Verquevo        -CHF Specific BB:    Metoprolol Succinate  at dose of 12.5mg qd.   We discussed processes/benefits of HF clinic including nursing, pharmacist, and provider evaluation during each visit with ability for in office ReDS vest, labs, and ability to provide IV diuresis in the clinic with close outpatient monitoring.  Additionally, patient was educated about the availability of delivery of medications to patient's clinic room prior to leaving the building which assists with medication compliance and insures medications are in hands when changes are made (if patient opts for apothecary usage) with thorough guidance regarding changes and medication schedule provided.          -ACE/ARB/ARNi:   Current dose: Entresto 24-26mg BID   Continue current dose at present with BP titrating well.     -MRA:   Spironolactone 12.5 mg on board; BMP checked on 05/20/24 and creatinine & potassium are within acceptable limits.      -SGLT2 inhibitor therapy:   A BMP at initiation to verify GFR >30 was recommended, as was interval GFR surveillance.  The patient was advised to hold SGLT2I when PO intake is restricted due to a planned surgery, or due to an underlying illness.    Recommend continuing Farxiga (dapagliflozin) 10mg daily with quarterly assessment of GFR.  Pharmacy helped with assistance program for affordability.    Pt was advised SEs, some severe, including hypersensitivity and  Oliver's; coupled with discussion regarding common side effects of UTIs and female genital mycotic infections were discussed. If you will be NPO, or are sick (poor PO intake, N&V) please hold the medication until you are back to a normal diet.     -Diuretic regimen:   ReDS Vest reading for. 05/20/24 is  34; ReDs Vest reading reviewed with patient.    Not requiring loop. Continue MRA at present as outlined.   BMP, Mag, & ProBNP reviewed with patient.      -Fluid restriction/Sodium restriction:   Requested 2000 ml restriction  Patient has been asked to weigh daily and was provided with a printed diuretic strategy.  1,500 mg Na restriction was discussed.      Secondary pharmacological therapy in HFreF:   EF is less than 45% @ 26-30%.   Maximized on GDMT as tolerated thus far including Toprol XL, Entresto, Farxiga, & Spironolactone  Recent hospitalization or IV diuresis includes March 2024 hospitalization with HFrEF  Given HFrEF with optimally treated with primary therapies for HFrEF and vasodilator therapy, will consider addition of Verquevo if approved by insurance.   Samples provided in office today.       -Devices if applicable:   Wearable CardioDefibrillator [employer] Life[employer]est interrogation report has been obtained and reviewed through the [employer].   Report was reviewed with patient including wear time trends, heart rate trends, treatable events, baselines, in addition to average daily steps and activities.  Additionally, debrillator system was unplugged and replugged during evaluation.  Purpose and importance of the wearable cardioverter defibrillator were discussed.  Greater than 10 minutes spent reviewing report  Avg HR 78 bpm  Avg daily steps 3291    -Acute vs. Chronic underlying conditions other than HF addressed during visit:   Tobacco abuse:   Tobacco Cessation Counseling:  >6) minutes of tobacco cessation counseling provided, including but not limited to, risks of ongoing tobacco use,  pertinence to current or future health status and availability/examples of cessation resources.  Patient expresses desire to quit.  Chantix prescribed with assistance program.     Identifiable barriers to Heart Failure Self-care:   Medical Barriers:  Multiple medical comorbidities  Social Barriers: Financial Dover of HF treatment          BMI is >= 30 and <35. (Class 1 Obesity). The following options were offered after discussion;: Heart Failure dietary measures              >45 minutes out of 60 minutes face to face spent counseling patient extensively on dietary Na+ intake, importance of activity, weight monitoring, compliance with medications in addition to importance of titration with goal directed medical therapy and follow up appointments.            This document has been electronically signed by Julee Cruz PA-C  May 20, 2024 14:06 EDT      Dictated Utilizing Dragon Dictation: Part of this note may be an electronic transcription/translation of spoken language to printed text using the Dragon Dictation System.    Follow-up appointment and medication changes provided in hand delivered After Visit Summary as well as reviewed in the room.

## 2024-06-03 ENCOUNTER — OFFICE VISIT (OUTPATIENT)
Dept: FAMILY MEDICINE CLINIC | Facility: CLINIC | Age: 59
End: 2024-06-03
Payer: MEDICAID

## 2024-06-03 VITALS
BODY MASS INDEX: 30.94 KG/M2 | DIASTOLIC BLOOD PRESSURE: 58 MMHG | TEMPERATURE: 98.8 F | SYSTOLIC BLOOD PRESSURE: 100 MMHG | HEART RATE: 88 BPM | HEIGHT: 71 IN | RESPIRATION RATE: 15 BRPM | WEIGHT: 221 LBS | OXYGEN SATURATION: 96 %

## 2024-06-03 DIAGNOSIS — I50.20 HFREF (HEART FAILURE WITH REDUCED EJECTION FRACTION): Chronic | ICD-10-CM

## 2024-06-03 DIAGNOSIS — I25.10 CORONARY ARTERY DISEASE INVOLVING NATIVE CORONARY ARTERY OF NATIVE HEART WITHOUT ANGINA PECTORIS: Chronic | ICD-10-CM

## 2024-06-03 DIAGNOSIS — E78.2 MIXED HYPERLIPIDEMIA: Primary | Chronic | ICD-10-CM

## 2024-06-03 DIAGNOSIS — Z00.00 HEALTHCARE MAINTENANCE: ICD-10-CM

## 2024-06-03 DIAGNOSIS — I10 ESSENTIAL HYPERTENSION: Chronic | ICD-10-CM

## 2024-06-03 DIAGNOSIS — J44.9 MIXED TYPE COPD (CHRONIC OBSTRUCTIVE PULMONARY DISEASE): ICD-10-CM

## 2024-06-03 DIAGNOSIS — F17.200 SMOKER: ICD-10-CM

## 2024-06-03 DIAGNOSIS — R73.03 PREDIABETES: ICD-10-CM

## 2024-06-03 NOTE — PROGRESS NOTES
Subjective   Dave Townsend is a 59 y.o. male.     Chief Complaint  He returns for a scheduled reassessment of multiple medical problems including coronary artery disease, HFrEF, essential hypertension, hyperlipidemia, and prediabetes    History of Present Illness     Coronary Artery Disease  He underwent stenting x 2 of the RCA by Dr. Mendosa on 3/26/2024 after presenting with an inferior STEMI.  His coronary angiogram was otherwise unremarkable.  Echocardiogram done while in hospital revealed mild to moderate left ventricular dilation with an estimated EF of 30%.  He was discharged on dual antiplatelet therapy.  He has been prescribed a LifeVest.  He remains of breath with above average exertion, but feels this remains at baseline.  He continues to deny any chest pain, palpitations, lightheadedness, apnea, PND, calf pain, or swelling of the ankles.  He gives a history of previous MI treated in [place].  He states that he stopped all of his medications 5 or 6 years ago.  He continues to be followed by cardiology, and is currently prescribed metoprolol succinate, sacubitril-valsartan, spironolactone, empagliflozin, and vericiguat.  He is scheduled to undergo an updated echocardiogram on 6/14/2024, and will see MARITA Latham again on 6/27/2024.  He is also followed by the heart failure clinic.  Unfortunately, he continues to smoke, but has reduced from 2 packs/day to 1 pack/day.  He is taking varenicline with no apparent side effects    Essential Hypertension  He gives a long history of hypertension.  He is currently on metoprolol succinate, valsartan, and spironolactone  Lab Results   Component Value Date    GLUCOSE 107 (H) 05/20/2024    CALCIUM 9.5 05/20/2024     05/20/2024    K 4.4 05/20/2024    CO2 20.4 (L) 05/20/2024     (H) 05/20/2024    BUN 22 (H) 05/20/2024    CREATININE 1.21 05/20/2024    EGFR 70.3 05/20/2024    BCR 18.2 05/20/2024    ANIONGAP 9.6 05/20/2024     Hyperlipidemia  He is  currently on atorvastatin.  He has not had an updated lipid profile    Prediabetes  His A1c was elevated in hospital.  He states his glucose has never been high in the past.  He is currently on dapagliflozin for HFrEF    Smoker  He gives a 50-pack-year history and is currently averaging 1 pack/day.     The following portions of the patient's history were reviewed and updated as appropriate: allergies, current medications, past medical history, past social history, past surgical history, and problem list.    Review of Systems   Constitutional:  Positive for fatigue. Negative for chills and fever.   HENT:  Negative for congestion, ear pain, hearing loss, postnasal drip, rhinorrhea, sinus pressure, sneezing, sore throat and voice change.    Eyes:  Negative for visual disturbance.   Respiratory:  Positive for shortness of breath. Negative for cough and wheezing.    Cardiovascular:  Negative for chest pain, palpitations and leg swelling.   Gastrointestinal:  Negative for abdominal pain, blood in stool, constipation, diarrhea, nausea, vomiting and GERD.   Genitourinary:  Positive for nocturia (2-3). Negative for dysuria, frequency, hematuria and urgency.   Musculoskeletal:  Negative for arthralgias, back pain, joint swelling, myalgias and neck pain.   Skin:  Negative for rash.   Neurological:  Negative for weakness and numbness.   Psychiatric/Behavioral:  Positive for sleep disturbance. Negative for suicidal ideas and depressed mood. The patient is not nervous/anxious.      Objective   Physical Exam  Constitutional:       General: He is not in acute distress.     Appearance: Normal appearance. He is well-developed. He is not ill-appearing or diaphoretic.      Comments: Appeared a bit older than stated age.  Alert and in fair spirits. No apparent distress. No pallor, jaundice, diaphoresis, or cyanosis.     HENT:      Head: Atraumatic.      Right Ear: Tympanic membrane, ear canal and external ear normal.      Left Ear:  Tympanic membrane, ear canal and external ear normal.      Mouth/Throat:      Lips: No lesions.      Mouth: Mucous membranes are moist. No oral lesions.      Dentition: Dental caries present.      Pharynx: No oropharyngeal exudate or posterior oropharyngeal erythema.   Eyes:      General: Lids are normal. Gaze aligned appropriately.      Extraocular Movements: Extraocular movements intact.      Conjunctiva/sclera: Conjunctivae normal.      Pupils: Pupils are equal.   Neck:      Thyroid: No thyroid mass or thyromegaly.      Vascular: No carotid bruit.      Trachea: Trachea normal. No tracheal deviation.   Cardiovascular:      Rate and Rhythm: Normal rate and regular rhythm.      Pulses:           Dorsalis pedis pulses are 2+ on the right side and 2+ on the left side.        Posterior tibial pulses are 2+ on the right side and 2+ on the left side.      Heart sounds: Normal heart sounds, S1 normal and S2 normal. No murmur heard.     No gallop.      Comments: No calf tenderness  Pulmonary:      Effort: Pulmonary effort is normal.      Breath sounds: Wheezing (diffuse - mild) present.      Comments: Pulmonary hyperinflation  Abdominal:      General: Bowel sounds are normal. There is no distension.   Musculoskeletal:      Right lower leg: No edema.      Left lower leg: No edema.   Lymphadenopathy:      Head:      Right side of head: No submental, submandibular, tonsillar, preauricular, posterior auricular or occipital adenopathy.      Left side of head: No submental, submandibular, tonsillar, preauricular, posterior auricular or occipital adenopathy.      Cervical: No cervical adenopathy.      Upper Body:      Right upper body: No supraclavicular adenopathy.      Left upper body: No supraclavicular adenopathy.   Skin:     General: Skin is warm and dry.      Coloration: Skin is not cyanotic, jaundiced or pale.      Findings: No rash.      Nails: There is no clubbing.   Neurological:      Mental Status: He is alert and  oriented to person, place, and time.      Cranial Nerves: No dysarthria or facial asymmetry.      Sensory: No sensory deficit.      Motor: No tremor.      Coordination: Coordination normal.      Gait: Gait normal.   Psychiatric:         Attention and Perception: Attention normal.         Mood and Affect: Mood normal.         Speech: Speech normal.         Behavior: Behavior normal.         Thought Content: Thought content normal.       Assessment & Plan   Problems Addressed this Visit          Cardiac and Vasculature    Coronary artery disease involving native coronary artery of native heart without angina pectoris (Chronic)  Reminded regarding risk factor modification with an emphasis on tobacco cessation.  Continue dual antiplatelet therapy.  Follow up with cardiology     Essential hypertension (Chronic)   Hypertension:  BP somewhat low . Evidence of target organ damage: coronary artery disease and heart failure.  Encouraged to continue to work on diet and exercise plan.   Continue current medication    HFrEF (heart failure with reduced ejection fraction) (Chronic)  Congestive heart failure due to coronary artery disease (CAD) and systolic dysfunction.  Reminded regarding the importance of lifestyle modification.  Continue current medication  Follow up with scheduled echocardiogram and heart failure clinic    Mixed hyperlipidemia  As above.   Continue current medication.  Reminded to follow-up with previously scheduled labs       Endocrine and Metabolic    Prediabetes  As above.       Health Encounters    Healthcare maintenance  Recommended a Prevnar 20, Shingrix, and updated Tdap.  Patient will consider  Reminded of the benefits of colon and lung cancer screening.  Patient will consider       Pulmonary and Pneumonias    Mixed type COPD (chronic obstructive pulmonary disease)   COPD is stable.  Reminded of the importance of smoking cessation       Tobacco    Smoker  Encouraged to continue to pursue smoking  cessation  Continue current medication     Diagnoses         Codes Comments    Mixed hyperlipidemia    -  Primary ICD-10-CM: E78.2  ICD-9-CM: 272.2     HFrEF (heart failure with reduced ejection fraction)     ICD-10-CM: I50.20  ICD-9-CM: 428.20     Essential hypertension     ICD-10-CM: I10  ICD-9-CM: 401.9     Coronary artery disease involving native coronary artery of native heart without angina pectoris     ICD-10-CM: I25.10  ICD-9-CM: 414.01     Prediabetes     ICD-10-CM: R73.03  ICD-9-CM: 790.29     Healthcare maintenance     ICD-10-CM: Z00.00  ICD-9-CM: V70.0     Mixed type COPD (chronic obstructive pulmonary disease)     ICD-10-CM: J44.9  ICD-9-CM: 496     Smoker     ICD-10-CM: F17.200  ICD-9-CM: 305.1           I spent 40 minutes caring for Dave Townsend on this date of service. This time includes time spent by me in the following activities:reviewing tests, performing a medically appropriate examination and/or evaluation , counseling and educating the patient/family/caregiver, ordering medications, tests, or procedures and documenting information in the medical record

## 2024-06-09 ENCOUNTER — LAB (OUTPATIENT)
Dept: LAB | Facility: HOSPITAL | Age: 59
End: 2024-06-09
Payer: MEDICAID

## 2024-06-09 DIAGNOSIS — E78.2 MIXED HYPERLIPIDEMIA: ICD-10-CM

## 2024-06-09 DIAGNOSIS — R73.03 PREDIABETES: ICD-10-CM

## 2024-06-09 DIAGNOSIS — D64.9 NORMOCYTIC ANEMIA: ICD-10-CM

## 2024-06-09 DIAGNOSIS — Z00.00 HEALTHCARE MAINTENANCE: ICD-10-CM

## 2024-06-09 DIAGNOSIS — J44.9 MIXED TYPE COPD (CHRONIC OBSTRUCTIVE PULMONARY DISEASE): ICD-10-CM

## 2024-06-09 DIAGNOSIS — I50.20 HFREF (HEART FAILURE WITH REDUCED EJECTION FRACTION): ICD-10-CM

## 2024-06-09 DIAGNOSIS — R79.89 ELEVATED TSH: ICD-10-CM

## 2024-06-09 DIAGNOSIS — I10 ESSENTIAL HYPERTENSION: ICD-10-CM

## 2024-06-09 DIAGNOSIS — I21.11 ST ELEVATION MYOCARDIAL INFARCTION INVOLVING RIGHT CORONARY ARTERY: ICD-10-CM

## 2024-06-09 LAB
ALBUMIN SERPL-MCNC: 4 G/DL (ref 3.5–5.2)
ALBUMIN/GLOB SERPL: 1.5 G/DL
ALP SERPL-CCNC: 74 U/L (ref 39–117)
ALT SERPL W P-5'-P-CCNC: 18 U/L (ref 1–41)
ANION GAP SERPL CALCULATED.3IONS-SCNC: 11.4 MMOL/L (ref 5–15)
AST SERPL-CCNC: 17 U/L (ref 1–40)
BASOPHILS # BLD AUTO: 0.06 10*3/MM3 (ref 0–0.2)
BASOPHILS NFR BLD AUTO: 0.8 % (ref 0–1.5)
BILIRUB SERPL-MCNC: 0.2 MG/DL (ref 0–1.2)
BILIRUB UR QL STRIP: NEGATIVE
BUN SERPL-MCNC: 21 MG/DL (ref 6–20)
BUN/CREAT SERPL: 20.2 (ref 7–25)
CALCIUM SPEC-SCNC: 9 MG/DL (ref 8.6–10.5)
CHLORIDE SERPL-SCNC: 105 MMOL/L (ref 98–107)
CHOLEST SERPL-MCNC: 103 MG/DL (ref 0–200)
CLARITY UR: CLEAR
CO2 SERPL-SCNC: 19.6 MMOL/L (ref 22–29)
COLOR UR: YELLOW
CREAT SERPL-MCNC: 1.04 MG/DL (ref 0.76–1.27)
DEPRECATED RDW RBC AUTO: 54.4 FL (ref 37–54)
EGFRCR SERPLBLD CKD-EPI 2021: 83.7 ML/MIN/1.73
EOSINOPHIL # BLD AUTO: 0.28 10*3/MM3 (ref 0–0.4)
EOSINOPHIL NFR BLD AUTO: 3.9 % (ref 0.3–6.2)
ERYTHROCYTE [DISTWIDTH] IN BLOOD BY AUTOMATED COUNT: 16.6 % (ref 12.3–15.4)
GLOBULIN UR ELPH-MCNC: 2.7 GM/DL
GLUCOSE SERPL-MCNC: 108 MG/DL (ref 65–99)
GLUCOSE UR STRIP-MCNC: ABNORMAL MG/DL
HCT VFR BLD AUTO: 28.6 % (ref 37.5–51)
HCV AB SER QL: NORMAL
HDLC SERPL-MCNC: 29 MG/DL (ref 40–60)
HGB BLD-MCNC: 8.9 G/DL (ref 13–17.7)
HGB UR QL STRIP.AUTO: NEGATIVE
IMM GRANULOCYTES # BLD AUTO: 0.08 10*3/MM3 (ref 0–0.05)
IMM GRANULOCYTES NFR BLD AUTO: 1.1 % (ref 0–0.5)
KETONES UR QL STRIP: NEGATIVE
LDLC SERPL CALC-MCNC: 55 MG/DL (ref 0–100)
LDLC/HDLC SERPL: 1.86 {RATIO}
LEUKOCYTE ESTERASE UR QL STRIP.AUTO: NEGATIVE
LYMPHOCYTES # BLD AUTO: 1.86 10*3/MM3 (ref 0.7–3.1)
LYMPHOCYTES NFR BLD AUTO: 26.1 % (ref 19.6–45.3)
MCH RBC QN AUTO: 28.1 PG (ref 26.6–33)
MCHC RBC AUTO-ENTMCNC: 31.1 G/DL (ref 31.5–35.7)
MCV RBC AUTO: 90.2 FL (ref 79–97)
MONOCYTES # BLD AUTO: 0.64 10*3/MM3 (ref 0.1–0.9)
MONOCYTES NFR BLD AUTO: 9 % (ref 5–12)
NEUTROPHILS NFR BLD AUTO: 4.2 10*3/MM3 (ref 1.7–7)
NEUTROPHILS NFR BLD AUTO: 59.1 % (ref 42.7–76)
NITRITE UR QL STRIP: NEGATIVE
NRBC BLD AUTO-RTO: 0.3 /100 WBC (ref 0–0.2)
PH UR STRIP.AUTO: 5.5 [PH] (ref 5–8)
PLATELET # BLD AUTO: 225 10*3/MM3 (ref 140–450)
PMV BLD AUTO: 10.7 FL (ref 6–12)
POTASSIUM SERPL-SCNC: 4.3 MMOL/L (ref 3.5–5.2)
PROT SERPL-MCNC: 6.7 G/DL (ref 6–8.5)
PROT UR QL STRIP: NEGATIVE
RBC # BLD AUTO: 3.17 10*6/MM3 (ref 4.14–5.8)
SODIUM SERPL-SCNC: 136 MMOL/L (ref 136–145)
SP GR UR STRIP: 1.02 (ref 1–1.03)
TRIGL SERPL-MCNC: 101 MG/DL (ref 0–150)
TSH SERPL DL<=0.05 MIU/L-ACNC: 6.01 UIU/ML (ref 0.27–4.2)
UROBILINOGEN UR QL STRIP: ABNORMAL
VLDLC SERPL-MCNC: 19 MG/DL (ref 5–40)
WBC NRBC COR # BLD AUTO: 7.12 10*3/MM3 (ref 3.4–10.8)

## 2024-06-09 PROCEDURE — 80061 LIPID PANEL: CPT

## 2024-06-09 PROCEDURE — 83036 HEMOGLOBIN GLYCOSYLATED A1C: CPT

## 2024-06-09 PROCEDURE — 83540 ASSAY OF IRON: CPT

## 2024-06-09 PROCEDURE — 86803 HEPATITIS C AB TEST: CPT

## 2024-06-09 PROCEDURE — 84466 ASSAY OF TRANSFERRIN: CPT

## 2024-06-09 PROCEDURE — 84439 ASSAY OF FREE THYROXINE: CPT

## 2024-06-09 PROCEDURE — 85025 COMPLETE CBC W/AUTO DIFF WBC: CPT

## 2024-06-09 PROCEDURE — 82728 ASSAY OF FERRITIN: CPT

## 2024-06-09 PROCEDURE — 80053 COMPREHEN METABOLIC PANEL: CPT

## 2024-06-09 PROCEDURE — 36415 COLL VENOUS BLD VENIPUNCTURE: CPT

## 2024-06-09 PROCEDURE — 85045 AUTOMATED RETICULOCYTE COUNT: CPT

## 2024-06-09 PROCEDURE — 81003 URINALYSIS AUTO W/O SCOPE: CPT

## 2024-06-09 PROCEDURE — 82607 VITAMIN B-12: CPT

## 2024-06-09 PROCEDURE — 84443 ASSAY THYROID STIM HORMONE: CPT

## 2024-06-10 DIAGNOSIS — R79.89 ELEVATED TSH: ICD-10-CM

## 2024-06-10 DIAGNOSIS — I10 ESSENTIAL HYPERTENSION: Primary | Chronic | ICD-10-CM

## 2024-06-10 DIAGNOSIS — R73.03 PREDIABETES: ICD-10-CM

## 2024-06-10 DIAGNOSIS — D64.9 NORMOCYTIC ANEMIA: ICD-10-CM

## 2024-06-10 LAB
FERRITIN SERPL-MCNC: 12.51 NG/ML (ref 30–400)
HBA1C MFR BLD: 5.1 % (ref 4.8–5.6)
IRON 24H UR-MRATE: 18 MCG/DL (ref 59–158)
RETICS # AUTO: 0.16 10*6/MM3 (ref 0.02–0.13)
RETICS/RBC NFR AUTO: 5.1 % (ref 0.7–1.9)
T4 FREE SERPL-MCNC: 1.08 NG/DL (ref 0.93–1.7)
TRANSFERRIN SERPL-MCNC: 300 MG/DL (ref 200–360)
VIT B12 BLD-MCNC: 369 PG/ML (ref 211–946)

## 2024-06-11 DIAGNOSIS — D50.0 IRON DEFICIENCY ANEMIA DUE TO CHRONIC BLOOD LOSS: Primary | ICD-10-CM

## 2024-06-11 LAB — REF LAB TEST METHOD: NORMAL

## 2024-06-11 RX ORDER — PANTOPRAZOLE SODIUM 40 MG/1
40 TABLET, DELAYED RELEASE ORAL DAILY
Qty: 30 TABLET | Refills: 5 | Status: SHIPPED | OUTPATIENT
Start: 2024-06-11

## 2024-06-11 RX ORDER — ASCORBIC ACID 500 MG
TABLET ORAL
Qty: 60 TABLET | Refills: 2 | Status: SHIPPED | OUTPATIENT
Start: 2024-06-11

## 2024-06-11 RX ORDER — FERROUS SULFATE 325(65) MG
TABLET ORAL
Qty: 60 TABLET | Refills: 2 | Status: SHIPPED | OUTPATIENT
Start: 2024-06-11

## 2024-06-12 DIAGNOSIS — D50.0 IRON DEFICIENCY ANEMIA DUE TO CHRONIC BLOOD LOSS: Primary | ICD-10-CM

## 2024-06-16 ENCOUNTER — HOSPITAL ENCOUNTER (OUTPATIENT)
Dept: CARDIOLOGY | Facility: HOSPITAL | Age: 59
Discharge: HOME | End: 2024-06-16
Admitting: NURSE PRACTITIONER
Payer: MEDICAID

## 2024-06-16 DIAGNOSIS — I50.20 HFREF (HEART FAILURE WITH REDUCED EJECTION FRACTION): Chronic | ICD-10-CM

## 2024-06-16 LAB
BH CV ECHO MEAS - EDV(CUBED): 248.9 ML
BH CV ECHO MEAS - EDV(MOD-SP4): 178 ML
BH CV ECHO MEAS - EF(MOD-SP4): 48.5 %
BH CV ECHO MEAS - ESV(CUBED): 151.4 ML
BH CV ECHO MEAS - ESV(MOD-SP4): 91.7 ML
BH CV ECHO MEAS - FS: 15.3 %
BH CV ECHO MEAS - IVS/LVPW: 1.16 CM
BH CV ECHO MEAS - IVSD: 1.11 CM
BH CV ECHO MEAS - LA DIMENSION: 3.5 CM
BH CV ECHO MEAS - LV DIASTOLIC VOL/BSA (35-75): 80.9 CM2
BH CV ECHO MEAS - LV MASS(C)D: 278.7 GRAMS
BH CV ECHO MEAS - LV SYSTOLIC VOL/BSA (12-30): 41.7 CM2
BH CV ECHO MEAS - LVIDD: 6.3 CM
BH CV ECHO MEAS - LVIDS: 5.3 CM
BH CV ECHO MEAS - LVPWD: 0.95 CM
BH CV ECHO MEAS - MV A MAX VEL: 91.3 CM/SEC
BH CV ECHO MEAS - MV E MAX VEL: 72.7 CM/SEC
BH CV ECHO MEAS - MV E/A: 0.8
BH CV ECHO MEAS - SV(MOD-SP4): 86.3 ML
BH CV ECHO MEAS - SVI(MOD-SP4): 39.2 ML/M2

## 2024-06-16 PROCEDURE — 93321 DOPPLER ECHO F-UP/LMTD STD: CPT | Performed by: INTERNAL MEDICINE

## 2024-06-16 PROCEDURE — 93308 TTE F-UP OR LMTD: CPT

## 2024-06-16 PROCEDURE — 93321 DOPPLER ECHO F-UP/LMTD STD: CPT

## 2024-06-16 PROCEDURE — 93308 TTE F-UP OR LMTD: CPT | Performed by: INTERNAL MEDICINE

## 2024-06-20 DIAGNOSIS — I50.20 HFREF (HEART FAILURE WITH REDUCED EJECTION FRACTION): Primary | Chronic | ICD-10-CM

## 2024-06-21 ENCOUNTER — TELEPHONE (OUTPATIENT)
Dept: CARDIOLOGY | Facility: CLINIC | Age: 59
End: 2024-06-21
Payer: MEDICAID

## 2024-06-21 NOTE — TELEPHONE ENCOUNTER
Unable to reach patient by phone. Left message for his  to call us . His niece called back and was given instructions for him to continue wearing the life vest. He has follow up scheduled for 06/27/24 which he needs to keep. Aiyana voiced understanding and will make patient aware.

## 2024-06-21 NOTE — TELEPHONE ENCOUNTER
----- Message from Katina Duarte sent at 6/20/2024  4:28 PM EDT -----  Please call patient about their echocardiogram results.    The echocardiogram does not show improvement of the pumping function of your heart.  Please continue current medications, wearing your LifeVest, and keep your appointment with Cinthia on 6/27/2024.    We will make the referral for you to be seen by the electrophysiologist cardiologist and Cinthia can answer any questions you have next week.    Thanks, Katina

## 2024-06-25 ENCOUNTER — TELEPHONE (OUTPATIENT)
Dept: CARDIOLOGY | Facility: CLINIC | Age: 59
End: 2024-06-25
Payer: COMMERCIAL

## 2024-06-25 NOTE — TELEPHONE ENCOUNTER
----- Message from Neha QUINONES sent at 6/21/2024 12:07 PM EDT -----  Unable to reach patient, left  with  for him to call us back

## 2024-06-25 NOTE — TELEPHONE ENCOUNTER
SPK TO THE PATIENT CONCERNING HIS ECHO RESULTS, AND TO CONTINUE WEARING HIS LIFE VEST AND KEEP UPCOMING APPT. ON THURSDAY TO DISCUSS GOING TO SEE ELECTROPHYSIOLOGIST. PATIENT IS AWARE AND EXPRESSED UNDERSTANDING.

## 2024-06-27 ENCOUNTER — OFFICE VISIT (OUTPATIENT)
Dept: CARDIOLOGY | Facility: CLINIC | Age: 59
End: 2024-06-27
Payer: COMMERCIAL

## 2024-06-27 VITALS
OXYGEN SATURATION: 97 % | DIASTOLIC BLOOD PRESSURE: 72 MMHG | SYSTOLIC BLOOD PRESSURE: 115 MMHG | HEART RATE: 87 BPM | HEIGHT: 71 IN | BODY MASS INDEX: 31.61 KG/M2 | WEIGHT: 225.8 LBS

## 2024-06-27 DIAGNOSIS — I10 ESSENTIAL HYPERTENSION: Chronic | ICD-10-CM

## 2024-06-27 DIAGNOSIS — F17.200 SMOKER: ICD-10-CM

## 2024-06-27 DIAGNOSIS — I50.20 HFREF (HEART FAILURE WITH REDUCED EJECTION FRACTION): Primary | Chronic | ICD-10-CM

## 2024-06-27 DIAGNOSIS — I25.10 CORONARY ARTERY DISEASE INVOLVING NATIVE CORONARY ARTERY OF NATIVE HEART WITHOUT ANGINA PECTORIS: Chronic | ICD-10-CM

## 2024-06-27 NOTE — PROGRESS NOTES
" Chief Complaint  Follow-up (Dizziness , sometimes has weakness )    Subjective          Dave Townsend presents to [facility] CARDIOLOGY for follow up.    History of Present Illness  Mr. Townsend presents for follow-up of HFrEF.  03/28/2024 his EF was calculated at 26 to 30%.  He has been on GDMT and wearing a LifeVest since that time.  He underwent repeat echocardiogram 06/16/2024 revealed no improvement in his ejection fraction despite medications.    Tobacco Use: High Risk (7/4/2024)    Patient History     Smoking Tobacco Use: Every Day     Smokeless Tobacco Use: Current     Passive Exposure: Never       Objective     Vital Signs:   /72 (BP Location: Right arm, Patient Position: Sitting, Cuff Size: Adult)   Pulse 87   Ht 180.3 cm (71\")   Wt 102 kg (225 lb 12.8 oz)   SpO2 97%   BMI 31.49 kg/m²       Physical Exam     Result Review :   The following data was reviewed by: MARITA Juarez on 06/27/2024:  Common labs          3/27/2024    01:07 5/20/2024    13:23 6/9/2024    10:24   Common Labs   Glucose 135  107  108    BUN 15  22  21    Creatinine 1.07  1.21  1.04    Sodium 138  138  136    Potassium 4.7  4.4  4.3    Chloride 105  108  105    Calcium 8.8  9.5  9.0    Albumin   4.0    Total Bilirubin   0.2    Alkaline Phosphatase   74    AST (SGOT)   17    ALT (SGPT)   18    WBC 10.78   7.12    Hemoglobin 13.7   8.9    Hematocrit 42.8   28.6    Platelets 199   225    Total Cholesterol 184   103    Triglycerides 136   101    HDL Cholesterol 37   29    LDL Cholesterol  122   55    Hemoglobin A1C 5.70   5.10      Lipid Panel          3/27/2024    01:07 6/9/2024    10:24   Lipid Panel   Total Cholesterol 184  103    Triglycerides 136  101    HDL Cholesterol 37  29    VLDL Cholesterol 25  19    LDL Cholesterol  122  55    LDL/HDL Ratio 3.24  1.86      Data reviewed : Cardiology studies as detailed below      Last Cardiac Cath  Results for orders placed during the hospital encounter " of 03/26/24    Cardiac Catheterization/Vascular Study    Conclusion    Mid RCA-2 lesion is 60% stenosed.    Dist RCA lesion is 100% stenosed.    RPAV lesion is 95% stenosed.    1.  Cardiac.  Patient with acute coronary syndrome involving the right coronary artery with 100% stenosis prior to intervention.  PTCA and stent placement to the right coronary artery done.  Angioplasty and stent of the posterolateral branch done also      Last Stress test       Last Echo  Results for orders placed during the hospital encounter of 06/16/24    Adult Transthoracic Echo Limited W/ Cont if Necessary Per Protocol    Interpretation Summary    Left ventricular ejection fraction appears to be 26 - 30%.    The left ventricular cavity is borderline dilated.             Current Outpatient Medications   Medication Sig Dispense Refill    aspirin 81 MG EC tablet Take 1 tablet by mouth Daily. 30 tablet 1    atorvastatin (LIPITOR) 40 MG tablet Take 1 tablet by mouth Every Night for 90 days. 90 tablet 0    dapagliflozin Propanediol 10 MG tablet Take 10 mg by mouth Daily for 90 days. 90 tablet 0    ferrous sulfate 325 (65 FE) MG tablet Take 1 tablet by mouth daily for one week, then 1 tablet twice daily afterward 60 tablet 2    metoprolol succinate XL (TOPROL-XL) 25 MG 24 hr tablet Take 1/2 tablet by mouth Daily. 45 tablet 0    nitroglycerin (NITROSTAT) 0.4 MG SL tablet Place 1 tablet under the tongue Every 5 minutes as Needed for Chest Pain (Systolic BP Greater Than 100). Take no more than 3 doses in 15 minutes. 25 tablet 0    pantoprazole (PROTONIX) 40 MG EC tablet Take 1 tablet by mouth Daily. 30 tablet 5    sacubitril-valsartan (Entresto) 24-26 MG tablet Take 1 tablet by mouth 2 (Two) Times a Day for 90 days. 180 tablet 1    spironolactone (ALDACTONE) 25 MG tablet Take ½ tablet by mouth Daily. 90 tablet 0    ticagrelor (BRILINTA) 90 MG tablet tablet Take 1 tablet by mouth 2 (Two) Times a Day. 180 each 3    Vericiguat (Verquvo) 10 MG tablet  Take 1 tablet by mouth Daily. 90 tablet 3    vitamin C (ASCORBIC ACID) 500 MG tablet Take 1 tablet by mouth with each dose of ferrous sulfate 60 tablet 2     No current facility-administered medications for this visit.            Assessment and Plan    Problem List Items Addressed This Visit       Essential hypertension (Chronic)    Smoker    HFrEF (heart failure with reduced ejection fraction) - Primary (Chronic)    Overview     03/26/2024-LVEF 26 to 30%  06/16/2024-LVEF 26 to 30%           Coronary artery disease involving native coronary artery of native heart without angina pectoris (Chronic)    Overview     03/26/[id number] of MICHAEL to RCA and posterolateral          Diagnoses and all orders for this visit:    1. HFrEF (heart failure with reduced ejection fraction) (Primary)    2. Essential hypertension    3. Coronary artery disease involving native coronary artery of native heart without angina pectoris    4. Smoker        Referral for electrophysiology has already been placed.  Continue current medical therapy sinew to recommend smoking cessation    Follow Up     No follow-ups on file.    Patient was given instructions and counseling regarding his condition or for health maintenance advice. Please see specific information pulled into the AVS if appropriate.       Electronically signed by MARITA Juarez, 07/04/24, 3:57 PM EDT.      Dictated Utilizing Dragon Dictation: Part of this note may be an electronic transcription/translation of spoken language to printed text using the Dragon Dictation System

## 2024-07-09 ENCOUNTER — TELEPHONE (OUTPATIENT)
Dept: FAMILY MEDICINE CLINIC | Facility: CLINIC | Age: 59
End: 2024-07-09
Payer: COMMERCIAL

## 2024-07-09 NOTE — TELEPHONE ENCOUNTER
Tried to call patient to give them the number for scheduling to get referral for surgery set up. [phone]

## 2024-07-16 ENCOUNTER — OFFICE VISIT (OUTPATIENT)
Dept: CARDIOLOGY | Facility: CLINIC | Age: 59
End: 2024-07-16
Payer: COMMERCIAL

## 2024-07-16 VITALS
BODY MASS INDEX: 31.22 KG/M2 | HEART RATE: 84 BPM | DIASTOLIC BLOOD PRESSURE: 62 MMHG | SYSTOLIC BLOOD PRESSURE: 110 MMHG | WEIGHT: 223 LBS | OXYGEN SATURATION: 97 % | HEIGHT: 71 IN

## 2024-07-16 DIAGNOSIS — I50.20 HFREF (HEART FAILURE WITH REDUCED EJECTION FRACTION): Primary | Chronic | ICD-10-CM

## 2024-07-16 DIAGNOSIS — I10 ESSENTIAL HYPERTENSION: Chronic | ICD-10-CM

## 2024-07-16 NOTE — PROGRESS NOTES
Cardiac Electrophysiology Outpatient Note  [facility]    Office Visit     Dave Townsend  [id number]  07/16/2024    Primary Care Physician: Claudio Jane MD    Referred By: Katina Duarte APRN    Subjective     Chief Complaint   Patient presents with    HFrEF (heart failure with reduced ejection fraction)     Problem List:  CAD  [facility] Dr. Mendosa 3/26/2024: Mid RCA 60% stenosis, distal % stenosis s/p stent, PLB 95% stenosis s/p stent  Ischemic cardiomyopathy  TTE 3/2024: LVEF 26-30%, normal diastolic function, mild MR  TTE 6/2024: LVEF 26-30%  Hypertension  Dyslipidemia        History of Present Illness:   Dave Townsend is a 59 y.o. male who presents to my electrophysiology clinic as a referral from MARITA Hamilton for consideration of ICD in the setting of ischemic cardiomyopathy with persistent LVEF <35% despite optimized GDMT.  Patient had a STEMI in March PCI to RCA and posterolateral branches.  His EF was 26-30% on 3/28/2024.  He has been wearing LifeVest since that time and on GDMT.  Repeat echocardiogram 6/16/2024 showed no improvement with LVEF still <35%.     Patient continues to wear his LifeVest which is quite bothersome to him.  Gets a little bit of dizziness when standing.  Also does have significant exercise limitations.  He describes this mostly as a feeling of weakness with exertion.  Likely does have some shortness of breath as well.  Likely can walk about 100 feet at the current time.  Smokes 1 pack a day.  Previously smoked 2 packs.        Past Medical History:   Diagnosis Date    Abnormal ECG     Hypertension     STEMI (ST elevation myocardial infarction) 03/26/2024       Past Surgical History:   Procedure Laterality Date    CARDIAC CATHETERIZATION N/A 03/26/2024    Procedure: Left Heart Cath;  Surgeon: Barry Mendosa MD;  Location: [facility] CATH INVASIVE LOCATION;  Service: Cardiovascular;  Laterality: N/A;    CORONARY  ANGIOPLASTY WITH STENT PLACEMENT  2017    at [place]       Family History   Problem Relation Age of Onset    Heart disease Mother     Emphysema Father        Social History     Socioeconomic History    Marital status: Single   Tobacco Use    Smoking status: Every Day     Current packs/day: 1.00     Types: Cigarettes     Passive exposure: Current    Smokeless tobacco: Current     Types: Snuff   Vaping Use    Vaping status: Never Used   Substance and Sexual Activity    Alcohol use: Never    Drug use: Never    Sexual activity: Defer         Current Outpatient Medications:     aspirin 81 MG EC tablet, Take 1 tablet by mouth Daily., Disp: 30 tablet, Rfl: 1    atorvastatin (LIPITOR) 40 MG tablet, Take 1 tablet by mouth Every Night for 90 days., Disp: 90 tablet, Rfl: 0    dapagliflozin Propanediol 10 MG tablet, Take 10 mg by mouth Daily for 90 days., Disp: 90 tablet, Rfl: 0    ferrous sulfate 325 (65 FE) MG tablet, Take 1 tablet by mouth daily for one week, then 1 tablet twice daily afterward, Disp: 60 tablet, Rfl: 2    metoprolol succinate XL (TOPROL-XL) 25 MG 24 hr tablet, Take 1/2 tablet by mouth Daily., Disp: 45 tablet, Rfl: 0    nitroglycerin (NITROSTAT) 0.4 MG SL tablet, Place 1 tablet under the tongue Every 5 minutes as Needed for Chest Pain (Systolic BP Greater Than 100). Take no more than 3 doses in 15 minutes., Disp: 25 tablet, Rfl: 0    sacubitril-valsartan (Entresto) 24-26 MG tablet, Take 1 tablet by mouth 2 (Two) Times a Day for 90 days., Disp: 180 tablet, Rfl: 1    spironolactone (ALDACTONE) 25 MG tablet, Take ½ tablet by mouth Daily., Disp: 90 tablet, Rfl: 0    ticagrelor (BRILINTA) 90 MG tablet tablet, Take 1 tablet by mouth 2 (Two) Times a Day., Disp: 180 each, Rfl: 3    Vericiguat (Verquvo) 10 MG tablet, Take 1 tablet by mouth Daily., Disp: 90 tablet, Rfl: 3    vitamin C (ASCORBIC ACID) 500 MG tablet, Take 1 tablet by mouth with each dose of ferrous sulfate, Disp: 60 tablet, Rfl: 2    pantoprazole (PROTONIX)  "40 MG EC tablet, Take 1 tablet by mouth Daily. (Patient not taking: Reported on 7/16/2024), Disp: 30 tablet, Rfl: 5    Allergies:   No Known Allergies    Objective   Vital Signs: Blood pressure 110/62, pulse 84, height 180.3 cm (70.98\"), weight 101 kg (223 lb), SpO2 97%.    PHYSICAL EXAM  General appearance: Awake, alert, cooperative  Head: Normocephalic, without obvious abnormality, atraumatic  Neck: No JVD  Lungs: Clear to ascultation bilaterally  Heart: Regular rate and rhythm, no murmurs, 2+ LE pulses, no lower extremity swelling  Skin: Skin color, turgor normal, no rashes or lesions  Neurologic: Grossly normal     Lab Results   Component Value Date    GLUCOSE 108 (H) 06/09/2024    CALCIUM 9.0 06/09/2024     06/09/2024    K 4.3 06/09/2024    CO2 19.6 (L) 06/09/2024     06/09/2024    BUN 21 (H) 06/09/2024    CREATININE 1.04 06/09/2024    BCR 20.2 06/09/2024    ANIONGAP 11.4 06/09/2024     Lab Results   Component Value Date    WBC 7.12 06/09/2024    HGB 8.9 (L) 06/09/2024    HCT 28.6 (L) 06/09/2024    MCV 90.2 06/09/2024     06/09/2024     No results found for: \"INR\", \"PROTIME\"  Lab Results   Component Value Date    TSH 6.010 (H) 06/09/2024          Results for orders placed during the hospital encounter of 06/16/24    Adult Transthoracic Echo Limited W/ Cont if Necessary Per Protocol    Interpretation Summary    Left ventricular ejection fraction appears to be 26 - 30%.    The left ventricular cavity is borderline dilated.     Results for orders placed during the hospital encounter of 03/26/24    Cardiac Catheterization/Vascular Study    Conclusion    Mid RCA-2 lesion is 60% stenosed.    Dist RCA lesion is 100% stenosed.    RPAV lesion is 95% stenosed.    1.  Cardiac.  Patient with acute coronary syndrome involving the right coronary artery with 100% stenosis prior to intervention.  PTCA and stent placement to the right coronary artery done.  Angioplasty and stent of the posterolateral branch " done also      I personally viewed and interpreted the patient's EKG/Telemetry/lab data    Procedures    Dave Townsend  reports that he has been smoking cigarettes. He has been exposed to tobacco smoke. His smokeless tobacco use includes snuff.     Assessment & Plan    1. HFrEF (heart failure with reduced ejection fraction)  Patient has history of ischemic cardiomyopathy secondary to prior MI.  His ejection fraction has remained less than 35% despite optimally tolerated goal-directed medical therapy.  He is referred for consideration of a primary prevention ICD.  We discussed that this is a personal choice.  Data which showed approximately 7% risk reduction in 5 years in this population.  After this discussion he would like to proceed with I think is quite reasonable.    We also discussed the option of an optimizer device as he does seem to have NYHA class III symptoms currently.  I think he would be a good candidate for this eventually.  We discussed the option of the combined optimizer defibrillator trial.  At the current time he would like to proceed with a standard defibrillator only.  We can also discuss addition of an optimizer down the road.  Will likely plan on a single-chamber ICD with atrial sensing.    2. Essential hypertension  Blood pressure is well-controlled today.  Will continue current regimen.       Follow Up:  Return for f/u after procedure.      Thank you for allowing me to participate in the care of your patient. Please do not hesitate to contact me with additional questions or concerns.      Haja Esteban M.D.  Cardiac Electrophysiologist  [facility] / [facility]

## 2024-08-09 ENCOUNTER — HOSPITAL ENCOUNTER (OUTPATIENT)
Dept: CARDIOLOGY | Facility: HOSPITAL | Age: 59
Discharge: HOME | End: 2024-08-09
Payer: COMMERCIAL

## 2024-08-09 VITALS
DIASTOLIC BLOOD PRESSURE: 64 MMHG | HEART RATE: 84 BPM | HEIGHT: 70 IN | OXYGEN SATURATION: 97 % | WEIGHT: 225 LBS | BODY MASS INDEX: 32.21 KG/M2 | SYSTOLIC BLOOD PRESSURE: 120 MMHG

## 2024-08-09 DIAGNOSIS — I25.10 CORONARY ARTERY DISEASE INVOLVING NATIVE CORONARY ARTERY OF NATIVE HEART WITHOUT ANGINA PECTORIS: Chronic | ICD-10-CM

## 2024-08-09 DIAGNOSIS — D50.0 IRON DEFICIENCY ANEMIA DUE TO CHRONIC BLOOD LOSS: ICD-10-CM

## 2024-08-09 DIAGNOSIS — I50.20 HFREF (HEART FAILURE WITH REDUCED EJECTION FRACTION): Primary | Chronic | ICD-10-CM

## 2024-08-09 LAB — ABSOLUTE LUNG FLUID CONTENT: 32 % (ref 20–35)

## 2024-08-09 PROCEDURE — 93292 WCD DEVICE INTERROGATE: CPT | Performed by: PHYSICIAN ASSISTANT

## 2024-08-09 PROCEDURE — 99214 OFFICE O/P EST MOD 30 MIN: CPT | Performed by: PHYSICIAN ASSISTANT

## 2024-08-09 RX ORDER — VERICIGUAT 10 MG/1
10 TABLET, FILM COATED ORAL DAILY
Qty: 90 TABLET | Refills: 3 | Status: SHIPPED | OUTPATIENT
Start: 2024-08-09 | End: 2025-08-09

## 2024-08-09 RX ORDER — SACUBITRIL AND VALSARTAN 24; 26 MG/1; MG/1
1 TABLET, FILM COATED ORAL 2 TIMES DAILY
Qty: 180 TABLET | Refills: 1 | Status: SHIPPED | OUTPATIENT
Start: 2024-08-09 | End: 2024-11-07

## 2024-08-09 RX ORDER — PANTOPRAZOLE SODIUM 40 MG/1
40 TABLET, DELAYED RELEASE ORAL DAILY
Qty: 30 TABLET | Refills: 5 | Status: SHIPPED | OUTPATIENT
Start: 2024-08-09

## 2024-08-09 RX ORDER — SPIRONOLACTONE 25 MG/1
12.5 TABLET ORAL DAILY
Qty: 90 TABLET | Refills: 0 | Status: SHIPPED | OUTPATIENT
Start: 2024-08-09 | End: 2025-02-05

## 2024-08-09 RX ORDER — METOPROLOL SUCCINATE 25 MG/1
12.5 TABLET, EXTENDED RELEASE ORAL
Qty: 90 TABLET | Refills: 0 | Status: SHIPPED | OUTPATIENT
Start: 2024-08-09 | End: 2025-02-05

## 2024-08-09 RX ORDER — DAPAGLIFLOZIN 10 MG/1
10 TABLET, FILM COATED ORAL DAILY
Qty: 90 TABLET | Refills: 1 | Status: SHIPPED | OUTPATIENT
Start: 2024-08-09 | End: 2025-02-05

## 2024-08-09 NOTE — PROGRESS NOTES
" Heart Failure Clinic  Pharmacist Note     Dave Townsend is a 59 y.o. male seen in the Heart Failure Clinic for HFrEF for most EF of 26-30% on 3/28/24. Patient was referred to us by cardiology since he could not afford Farxiga. Cardiology has given him samples of Entresto and Farxiga.     Dave Townsend reports a poor understanding of medications, but reports adherence to his doses since being discharged in March. He brought in his medication bottles with him today.     He reports that he checks his BP daily and it runs \"good\" for the most part. He denies any episodes of lightheadedness.     He denies any swelling or SOB and is currently only on 12.5mg of Spironolactone at this time.     He reports that he does not have a job at this time and cannot afford any medications that have a copay on them.       Medication Use:   Hx of med intolerances:  None related to HF  Retail Rx Management: [facility] lives in [place]- cannot mail; want 90 day supplies!- focusing today 5/20/24- re-evlauate need    Past Medical History:   Diagnosis Date    Abnormal ECG     Hypertension     STEMI (ST elevation myocardial infarction) 03/26/2024     ALLERGIES: Patient has no known allergies.  Current Outpatient Medications   Medication Sig Dispense Refill    aspirin 81 MG EC tablet Take 1 tablet by mouth Daily. 30 tablet 1    atorvastatin (LIPITOR) 40 MG tablet Take 1 tablet by mouth Every Night for 90 days. 90 tablet 0    dapagliflozin Propanediol 10 MG tablet Take 10 mg by mouth Daily for 90 days. 90 tablet 0    ferrous sulfate 325 (65 FE) MG tablet Take 1 tablet by mouth daily for one week, then 1 tablet twice daily afterward 60 tablet 2    metoprolol succinate XL (TOPROL-XL) 25 MG 24 hr tablet Take 1/2 tablet by mouth Daily. 45 tablet 0    nitroglycerin (NITROSTAT) 0.4 MG SL tablet Place 1 tablet under the tongue Every 5 minutes as Needed for Chest Pain (Systolic BP Greater Than 100). Take no more than 3 doses in 15 minutes. 25 tablet 0    " "pantoprazole (PROTONIX) 40 MG EC tablet Take 1 tablet by mouth Daily. (Patient not taking: Reported on 7/16/2024) 30 tablet 5    sacubitril-valsartan (Entresto) 24-26 MG tablet Take 1 tablet by mouth 2 (Two) Times a Day for 90 days. 180 tablet 1    spironolactone (ALDACTONE) 25 MG tablet Take ½ tablet by mouth Daily. 90 tablet 0    ticagrelor (BRILINTA) 90 MG tablet tablet Take 1 tablet by mouth 2 (Two) Times a Day. 180 each 3    Vericiguat (Verquvo) 10 MG tablet Take 1 tablet by mouth Daily. 90 tablet 3    vitamin C (ASCORBIC ACID) 500 MG tablet Take 1 tablet by mouth with each dose of ferrous sulfate 60 tablet 2     No current facility-administered medications for this encounter.       Vaccination History:   Pneumonia: Got sick with a past dose and is not interested in   Annual Influenza: Usually does not get- see during next flu season  Shingles: Not interested in    Objective  Vitals:    08/09/24 1115   BP: 120/64   BP Location: Right arm   Patient Position: Sitting   Cuff Size: Adult   Pulse: 84   SpO2: 97%   Weight: 102 kg (225 lb)   Height: 177.8 cm (70\")     Wt Readings from Last 3 Encounters:   08/09/24 102 kg (225 lb)   07/16/24 101 kg (223 lb)   06/27/24 102 kg (225 lb 12.8 oz)         08/09/24  1115   Weight: 102 kg (225 lb)     Lab Results   Component Value Date    GLUCOSE 108 (H) 06/09/2024    BUN 21 (H) 06/09/2024    CREATININE 1.04 06/09/2024    BCR 20.2 06/09/2024    K 4.3 06/09/2024    CO2 19.6 (L) 06/09/2024    CALCIUM 9.0 06/09/2024    ALBUMIN 4.0 06/09/2024    AST 17 06/09/2024    ALT 18 06/09/2024     Lab Results   Component Value Date    WBC 7.12 06/09/2024    HGB 8.9 (L) 06/09/2024    HCT 28.6 (L) 06/09/2024    MCV 90.2 06/09/2024     06/09/2024     Lab Results   Component Value Date    TROPONINT 2,243 (C) 03/28/2024     Lab Results   Component Value Date    PROBNP 194.0 05/20/2024     Results for orders placed during the hospital encounter of 06/16/24    Adult Transthoracic Echo " Limited W/ Cont if Necessary Per Protocol    Interpretation Summary    Left ventricular ejection fraction appears to be 26 - 30%.    The left ventricular cavity is borderline dilated.         GDMT    Drug Class   Drug   Dose Last Dose Adjustment Additional Titration   Notes   ACEi/ARB/ARNI Entresto 24/26mg 4/28/24 Katina     Beta Blocker Toprol 12.5mg ~ 3/2024     MRA Spironolactone 12.5mg 5/9/24 Katina     SGLT2i Farxiga 10mg 4/28/24  Katina      Verquvo 10mg 5/20/24         Drug Therapy Problems    1. Refills    Recommendations:     Asked Julee to send in refills for him. Got all Rx's due that could be filled, since patient has to pick them up since he lives in [place].        Patient was educated on heart failure medications and the importance of medication adherence. All questions were addressed and patient expressed understanding. Used teach-back method to assess understanding.     Thank you for allowing me to participate in the care of your patient,    Rhina Sandra [facility]  08/09/24  11:25 EDT

## 2024-08-09 NOTE — PROGRESS NOTES
[facility] Heart Failure Clinic  Julee Cruz PA-C       Referring, [place] [address]    Thank you for asking me to see Dave Townsend for congestive heart failure.    HPI:     This is a 59 y.o. male with known past medical history of:    HFrEF  TTE 3/2024: LVEF 26-30%, normal diastolic function, mild MR  TTE 6/2024: LVEF 26-30%  ASCVD status post STEMI in March 2024  LHC in March 2024 PCI drug-eluting stent to the RCA and 2 stents to the posterolateral branch  Essential hypertension  Hyperlipidemia    Dave Townsend presents for today for heart failure clinic evaluation.  The patient is typically seen by Claudio Jane MD.  Patient's primary cardiologist is Dr. Mendosa.     Last known EF 26-30%.   Last known hospitalization and/or ED visit: She was hospitalized from March 26 through March 28, 2024 with STEMI which time patient underwent left heart catheterization with PCI drug-eluting stent to the RCA and 2 stents to the posterolateral branch  Accompanied by: Self          08/09/24 visit data/details regarding:   Dyspnea: Denies  Lower extremity swelling: Improved  Abdominal swelling: Denies  Home weight: Weight monitoring booklet provided during initial visit; Has scale.   Home BP: BP monitoring booklet provided during initial visit; Has BP cuff.   Home heart rate: HR monitoring booklet provided during initial visit.  Daily activities of living: Performing on his own  Pillows/lying flat: 3 pillows in bed   HF zone: Green  Patient is doing well.  He has followed up with EP and plans to have AICD placement. He reports he is currently waiting to here back about scheduling arrangements.    From HF standpoint, he is doing well.              Review of Systems - Review of Systems   Constitutional: Negative for decreased appetite and diaphoresis.   HENT:  Negative for congestion and ear pain.    Eyes:  Negative for blurred vision, double vision and pain.    Cardiovascular:  Negative for chest pain, claudication and dyspnea on exertion.   Respiratory:  Positive for shortness of breath. Negative for cough.    Endocrine: Negative for cold intolerance and heat intolerance.   Hematologic/Lymphatic: Negative for adenopathy and bleeding problem.   Skin:  Negative for dry skin and nail changes.   Musculoskeletal:  Negative for arthritis and falls.   Gastrointestinal:  Negative for bloating and anorexia.   Genitourinary:  Negative for bladder incontinence and dysuria.   Neurological:  Negative for aphonia and difficulty with concentration.   Psychiatric/Behavioral:  Negative for altered mental status and hallucinations.          All other systems were reviewed and were negative.    Patient Active Problem List   Diagnosis    Essential hypertension    Mixed hyperlipidemia    Prediabetes    Smoker    Mixed type COPD (chronic obstructive pulmonary disease)    Healthcare maintenance    HFrEF (heart failure with reduced ejection fraction)    Coronary artery disease involving native coronary artery of native heart without angina pectoris    Iron deficiency anemia due to chronic blood loss       family history includes Emphysema in his father; Heart disease in his mother.     reports that he has been smoking cigarettes. He has been exposed to tobacco smoke. His smokeless tobacco use includes snuff. He reports that he does not drink alcohol and does not use drugs.    No Known Allergies      Current Outpatient Medications:     aspirin 81 MG EC tablet, Take 1 tablet by mouth Daily., Disp: 30 tablet, Rfl: 1    atorvastatin (LIPITOR) 40 MG tablet, Take 1 tablet by mouth Every Night for 90 days., Disp: 90 tablet, Rfl: 0    dapagliflozin Propanediol 10 MG tablet, Take 1 tablet by mouth Daily for 180 days., Disp: 90 tablet, Rfl: 1    ferrous sulfate 325 (65 FE) MG tablet, Take 1 tablet by mouth daily for one week, then 1 tablet twice daily afterward (Patient taking differently: Take 1 tablet  by mouth Daily. Take 1 tablet by mouth daily for one week, then 1 tablet twice daily afterward), Disp: 60 tablet, Rfl: 2    metoprolol succinate XL (TOPROL-XL) 25 MG 24 hr tablet, Take ½ tablet by mouth Daily., Disp: 90 tablet, Rfl: 0    nitroglycerin (NITROSTAT) 0.4 MG SL tablet, Place 1 tablet under the tongue Every 5 minutes as Needed for Chest Pain (Systolic BP Greater Than 100). Take no more than 3 doses in 15 minutes., Disp: 25 tablet, Rfl: 0    pantoprazole (PROTONIX) 40 MG EC tablet, Take 1 tablet by mouth Daily., Disp: 30 tablet, Rfl: 5    sacubitril-valsartan (Entresto) 24-26 MG tablet, Take 1 tablet by mouth 2 Times a Day for 90 days., Disp: 180 tablet, Rfl: 1    spironolactone (ALDACTONE) 25 MG tablet, Take ½ tablet by mouth Daily., Disp: 90 tablet, Rfl: 0    ticagrelor (BRILINTA) 90 MG tablet tablet, Take 1 tablet by mouth 2 (Two) Times a Day., Disp: 180 each, Rfl: 3    Vericiguat (Verquvo) 10 MG tablet, Take 1 tablet by mouth Daily., Disp: 90 tablet, Rfl: 3    vitamin C (ASCORBIC ACID) 500 MG tablet, Take 1 tablet by mouth with each dose of ferrous sulfate, Disp: 60 tablet, Rfl: 2      Physical Exam:  I have reviewed the patient's current vital signs as documented in the patient's EMR.   Vitals:    08/09/24 1115   BP: 120/64   Pulse: 84   SpO2: 97%       Body mass index is 32.28 kg/m².       08/09/24  1115   Weight: 102 kg (225 lb)        Physical Exam  Vitals and nursing note reviewed.   Constitutional:       General: He is awake.      Appearance: Normal appearance. He is well-developed and well-groomed.   HENT:      Head: Normocephalic and atraumatic.   Eyes:      General: Lids are normal.      Conjunctiva/sclera:      Right eye: Right conjunctiva is not injected.      Left eye: Left conjunctiva is not injected.   Cardiovascular:      Rate and Rhythm: Normal rate and regular rhythm.      Heart sounds:      No S3 or S4 sounds.   Pulmonary:      Effort: No tachypnea or bradypnea.      Breath sounds: No  decreased breath sounds, wheezing, rhonchi or rales.   Abdominal:      General: Bowel sounds are normal.      Palpations: Abdomen is soft.      Tenderness: There is no abdominal tenderness.   Musculoskeletal:      Right lower leg: No edema.      Left lower leg: No edema.   Skin:     General: Skin is warm and dry.   Neurological:      Mental Status: He is alert and oriented to person, place, and time.   Psychiatric:         Attention and Perception: Attention normal.         Mood and Affect: Mood normal.         Behavior: Behavior is cooperative.          JVP: Volume/Pulsation: Normal.        DATA REVIEWED:     ---------------------------------------------------  TTE/ROSA:  Results for orders placed during the hospital encounter of 06/16/24    Adult Transthoracic Echo Limited W/ Cont if Necessary Per Protocol    Interpretation Summary    Left ventricular ejection fraction appears to be 26 - 30%.    The left ventricular cavity is borderline dilated.        LAST HEART CATH/IF AVAILABLE:     Results for orders placed during the hospital encounter of 03/26/24    Cardiac Catheterization/Vascular Study    Conclusion    Mid RCA-2 lesion is 60% stenosed.    Dist RCA lesion is 100% stenosed.    RPAV lesion is 95% stenosed.    1.  Cardiac.  Patient with acute coronary syndrome involving the right coronary artery with 100% stenosis prior to intervention.  PTCA and stent placement to the right coronary artery done.  Angioplasty and stent of the posterolateral branch done also      -----------------------------------------------------  CXR/Imaging:   Imaging Results (Most Recent)       None            I personally reviewed and interpreted the CXR.      -----------------------------------------------------  CT:   No radiology results for the last 30 days.  I personally reviewed the images of the CT scan.  My personal interpretation is below.   "    ----------------------------------------------------    --------------------------------------------------------------------------------------------------    Laboratory evaluations:    Lab Results   Component Value Date    GLUCOSE 108 (H) 06/09/2024    BUN 21 (H) 06/09/2024    CREATININE 1.04 06/09/2024    BCR 20.2 06/09/2024    K 4.3 06/09/2024    CO2 19.6 (L) 06/09/2024    CALCIUM 9.0 06/09/2024    ALBUMIN 4.0 06/09/2024    AST 17 06/09/2024    ALT 18 06/09/2024     Lab Results   Component Value Date    WBC 7.12 06/09/2024    HGB 8.9 (L) 06/09/2024    HCT 28.6 (L) 06/09/2024    MCV 90.2 06/09/2024     06/09/2024     Lab Results   Component Value Date    CHOL 103 06/09/2024    TRIG 101 06/09/2024    HDL 29 (L) 06/09/2024    LDL 55 06/09/2024     Lab Results   Component Value Date    TSH 6.010 (H) 06/09/2024     Lab Results   Component Value Date    HGBA1C 5.10 06/09/2024     Lab Results   Component Value Date    ALT 18 06/09/2024     Lab Results   Component Value Date    HGBA1C 5.10 06/09/2024    HGBA1C 5.70 (H) 03/27/2024     Lab Results   Component Value Date    CREATININE 1.04 06/09/2024     Lab Results   Component Value Date    IRON 18 (L) 06/09/2024    FERRITIN 12.51 (L) 06/09/2024     No results found for: \"INR\", \"PROTIME\"     Lab Results   Component Value Date    ABSOLUTELUNG 32 08/09/2024    ABSOLUTELUNG 34 05/20/2024       PAH RISK ASSESSMENT:      1. HFrEF (heart failure with reduced ejection fraction)    2. Coronary artery disease involving native coronary artery of native heart without angina pectoris    3. Iron deficiency anemia due to chronic blood loss            ORDERS PLACED TODAY:  Orders Placed This Encounter   Procedures    ReDs Vest        Diagnoses and all orders for this visit:    1. HFrEF (heart failure with reduced ejection fraction) (Primary)  Overview:  03/26/2024-LVEF 26 to 30%  06/16/2024-LVEF 26 to 30%      Orders:  -     ReDs Vest  -     sacubitril-valsartan (Entresto) " 24-26 MG tablet; Take 1 tablet by mouth 2 Times a Day for 90 days.  Dispense: 180 tablet; Refill: 1  -     dapagliflozin Propanediol 10 MG tablet; Take 1 tablet by mouth Daily for 180 days.  Dispense: 90 tablet; Refill: 1    2. Coronary artery disease involving native coronary artery of native heart without angina pectoris  Overview:  03/26/20248355-SKACG-AVM of MICHAEL to RCA and posterolateral      3. Iron deficiency anemia due to chronic blood loss  -     pantoprazole (PROTONIX) 40 MG EC tablet; Take 1 tablet by mouth Daily.  Dispense: 30 tablet; Refill: 5    Other orders  -     Vericiguat (Verquvo) 10 MG tablet; Take 1 tablet by mouth Daily.  Dispense: 90 tablet; Refill: 3  -     spironolactone (ALDACTONE) 25 MG tablet; Take ½ tablet by mouth Daily.  Dispense: 90 tablet; Refill: 0  -     metoprolol succinate XL (TOPROL-XL) 25 MG 24 hr tablet; Take ½ tablet by mouth Daily.  Dispense: 90 tablet; Refill: 0               MEDS ORDERED TODAY:    New Medications Ordered This Visit   Medications    pantoprazole (PROTONIX) 40 MG EC tablet     Sig: Take 1 tablet by mouth Daily.     Dispense:  30 tablet     Refill:  5    Vericiguat (Verquvo) 10 MG tablet     Sig: Take 1 tablet by mouth Daily.     Dispense:  90 tablet     Refill:  3    spironolactone (ALDACTONE) 25 MG tablet     Sig: Take ½ tablet by mouth Daily.     Dispense:  90 tablet     Refill:  0    sacubitril-valsartan (Entresto) 24-26 MG tablet     Sig: Take 1 tablet by mouth 2 Times a Day for 90 days.     Dispense:  180 tablet     Refill:  1     Order Specific Question:   Lot Number?     Answer:   [id number]     Order Specific Question:   Expiration Date?     Answer:   5/30/2026     Order Specific Question:   Quantity     Answer:   56    metoprolol succinate XL (TOPROL-XL) 25 MG 24 hr tablet     Sig: Take ½ tablet by mouth Daily.     Dispense:  90 tablet     Refill:  0    dapagliflozin Propanediol 10 MG tablet     Sig: Take 1 tablet by mouth Daily for 180 days.      Dispense:  90 tablet     Refill:  1     Order Specific Question:   Lot Number?     Answer:   [id number]     Order Specific Question:   Expiration Date?     Answer:   10/31/2026     Order Specific Question:   Quantity     Answer:   28        ---------------------------------------------------------------------------------------------------------------------------          ASSESSMENT/PLAN:      Diagnosis Plan   1. HFrEF (heart failure with reduced ejection fraction)  ReDs Vest    sacubitril-valsartan (Entresto) 24-26 MG tablet    dapagliflozin Propanediol 10 MG tablet      2. Coronary artery disease involving native coronary artery of native heart without angina pectoris        3. Iron deficiency anemia due to chronic blood loss  pantoprazole (PROTONIX) 40 MG EC tablet            not acutely decompensated chronic moderate systolic and diastolic heart failure. CHF. Etiology: Ischemic/CAD (Hx of AMI, CAD, or Ischemic Cardiomyopathy).     NYHA stage Stage C: Structural heart disease is present AND symptoms have occurredFC-Class III: Marked limitation of physical activity. Comfortable at rest. Less than ordinary activity causes fatigue, palpitation, or dyspnea.     Today, Patient is approaching euvolemiaand with  Moderate perfusion. The patient's hemodynamics are currently acceptable. HR is: normal and is at goal. BP/MAP was reviewed and there isroom for medication up-titration.  Clinical trajectory was assessed and hasimproved.     CHF GOAL DIRECTED MEDICAL THERAPY FOR PATIENT ADDRESSED/ADJUSTED:     GDMT: HFrEF    Drug Class   Drug   Dose Last Dose Adjustment Notes   ACEi/ARB/ARNI Entresto 24-26mg BID     Beta Blocker Toprol XL  12.5mg qd     MRA Spironolactone 12.5mg qd     SGLT2i Farxiga 10mg  N/A   Secondaries if applicable:  Start Verquevo        -CHF Specific BB:    Metoprolol Succinate  at dose of 12.5mg qd.   We discussed processes/benefits of HF clinic including nursing, pharmacist, and provider evaluation during  each visit with ability for in office ReDS vest, labs, and ability to provide IV diuresis in the clinic with close outpatient monitoring.  Additionally, patient was educated about the availability of delivery of medications to patient's clinic room prior to leaving the building which assists with medication compliance and insures medications are in hands when changes are made (if patient opts for apothecary usage) with thorough guidance regarding changes and medication schedule provided.          -ACE/ARB/ARNi:   Current dose: Entresto 24-26mg BID   Continue current dose at present with BP titrating well.     -MRA:   Spironolactone 12.5 mg on board; BMP checked on 08/12/24 and creatinine & potassium are within acceptable limits.      -SGLT2 inhibitor therapy:   A BMP at initiation to verify GFR >30 was recommended, as was interval GFR surveillance.  The patient was advised to hold SGLT2I when PO intake is restricted due to a planned surgery, or due to an underlying illness.    Recommend continuing Farxiga (dapagliflozin) 10mg daily with quarterly assessment of GFR.  Pharmacy helped with assistance program for affordability.    Pt was advised SEs, some severe, including hypersensitivity and Oliver's; coupled with discussion regarding common side effects of UTIs and female genital mycotic infections were discussed. If you will be NPO, or are sick (poor PO intake, N&V) please hold the medication until you are back to a normal diet.     -Diuretic regimen:   ReDS Vest reading for. 08/12/24 is 32; ReDs Vest reading reviewed with patient.    Not requiring loop. Continue MRA at present as outlined.   BMP, Mag, & ProBNP reviewed with patient.      -Fluid restriction/Sodium restriction:   Requested 2000 ml restriction  Patient has been asked to weigh daily and was provided with a printed diuretic strategy.  1,500 mg Na restriction was discussed.      Secondary pharmacological therapy in HFreF:   EF is less than 45% @  26-30%.   Maximized on GDMT as tolerated thus far including Toprol XL, Entresto, Farxiga, & Spironolactone  Recent hospitalization or IV diuresis includes March 2024 hospitalization with HFrEF  Given HFrEF with optimally treated with primary therapies for HFrEF and vasodilator therapy, will consider addition of Verquevo if approved by insurance.   Samples provided in office today.       -Devices if applicable:   Wearable CardioDefibrillator [employer] interrogation report has been obtained and reviewed through the [employer].   Report was reviewed with patient including wear time trends, heart rate trends, treatable events, baselines, in addition to average daily steps and activities.  Additionally, debrillator system was unplugged and replugged during evaluation.  Purpose and importance of the wearable cardioverter defibrillator were discussed.  Greater than 10 minutes spent reviewing report  Avg HR 82  Avg daily steps [id number]    -Acute vs. Chronic underlying conditions other than HF addressed during visit:     Identifiable barriers to Heart Failure Self-care:   Medical Barriers:  Multiple medical comorbidities  Social Barriers: Financial [place] of HF treatment          BMI is >= 30 and <35. (Class 1 Obesity). The following options were offered after discussion;: Heart Failure dietary measures              >45 minutes out of 60 minutes face to face spent counseling patient extensively on dietary Na+ intake, importance of activity, weight monitoring, compliance with medications in addition to importance of titration with goal directed medical therapy and follow up appointments.            This document has been electronically signed by Julee Cruz PA-C  August 12, 2024 09:11 EDT      Dictated Utilizing Dragon Dictation: Part of this note may be an electronic transcription/translation of spoken language to printed text using the Dragon Dictation System.    Follow-up appointment and  medication changes provided in hand delivered After Visit Summary as well as reviewed in the room.

## 2024-08-09 NOTE — PROGRESS NOTES
Heart Failure Clinic    Date: 08/09/24     Vitals:    08/09/24 1115   BP: 120/64   Pulse: 84   SpO2: 97%    Weight 225    Method of arrival: Ambulatory    Weighing self daily: No    Monitoring Heart Failure Zones: Yes    Today's HF Zone: Green    Taking medications as prescribed: Yes    Edema No    Shortness of Air: No    Number of pillows used at night:<2    Educational Materials given:  AVS                                                                         ReDS Value: 32  25-35 Optimal Value Status      Anette Wolfe MA 08/09/24 11:18 EDT

## 2024-08-12 ENCOUNTER — OFFICE VISIT (OUTPATIENT)
Dept: FAMILY MEDICINE CLINIC | Facility: CLINIC | Age: 59
End: 2024-08-12
Payer: COMMERCIAL

## 2024-08-12 VITALS
HEIGHT: 70 IN | BODY MASS INDEX: 32.21 KG/M2 | HEART RATE: 95 BPM | RESPIRATION RATE: 15 BRPM | DIASTOLIC BLOOD PRESSURE: 52 MMHG | SYSTOLIC BLOOD PRESSURE: 92 MMHG | WEIGHT: 225 LBS | TEMPERATURE: 98.2 F | OXYGEN SATURATION: 99 %

## 2024-08-12 DIAGNOSIS — J44.9 MIXED TYPE COPD (CHRONIC OBSTRUCTIVE PULMONARY DISEASE): ICD-10-CM

## 2024-08-12 DIAGNOSIS — I25.10 CORONARY ARTERY DISEASE INVOLVING NATIVE CORONARY ARTERY OF NATIVE HEART WITHOUT ANGINA PECTORIS: Chronic | ICD-10-CM

## 2024-08-12 DIAGNOSIS — R73.03 PREDIABETES: ICD-10-CM

## 2024-08-12 DIAGNOSIS — D50.0 IRON DEFICIENCY ANEMIA DUE TO CHRONIC BLOOD LOSS: ICD-10-CM

## 2024-08-12 DIAGNOSIS — E78.2 MIXED HYPERLIPIDEMIA: Primary | Chronic | ICD-10-CM

## 2024-08-12 DIAGNOSIS — I10 ESSENTIAL HYPERTENSION: Chronic | ICD-10-CM

## 2024-08-12 DIAGNOSIS — Z00.00 HEALTHCARE MAINTENANCE: ICD-10-CM

## 2024-08-12 DIAGNOSIS — I50.20 HFREF (HEART FAILURE WITH REDUCED EJECTION FRACTION): Chronic | ICD-10-CM

## 2024-08-12 DIAGNOSIS — Z23 ENCOUNTER FOR IMMUNIZATION: ICD-10-CM

## 2024-08-12 DIAGNOSIS — F17.200 SMOKER: ICD-10-CM

## 2024-08-12 PROCEDURE — 84466 ASSAY OF TRANSFERRIN: CPT | Performed by: GENERAL PRACTICE

## 2024-08-12 PROCEDURE — 80061 LIPID PANEL: CPT | Performed by: GENERAL PRACTICE

## 2024-08-12 PROCEDURE — 80053 COMPREHEN METABOLIC PANEL: CPT | Performed by: GENERAL PRACTICE

## 2024-08-12 PROCEDURE — 85025 COMPLETE CBC W/AUTO DIFF WBC: CPT | Performed by: GENERAL PRACTICE

## 2024-08-12 PROCEDURE — 83540 ASSAY OF IRON: CPT | Performed by: GENERAL PRACTICE

## 2024-08-12 PROCEDURE — 82728 ASSAY OF FERRITIN: CPT | Performed by: GENERAL PRACTICE

## 2024-08-12 NOTE — PROGRESS NOTES
Subjective   Dave Townsend is a 59 y.o. male.     Chief Complaint  He returns for a scheduled reassessment of multiple medical problems including coronary artery disease, essential hypertension, hyperlipidemia, prediabetes, iron deficiency anemia, and smoker    History of Present Illness     Coronary Artery Disease  He underwent stenting x 2 of the RCA by Dr. Mendosa on 3/26/2024 after presenting with an inferior STEMI.  His coronary angiogram was otherwise unremarkable.  An echocardiogram done while in hospital revealed mild to moderate left ventricular dilation with an estimated EF of 30%.  He was discharged on dual antiplatelet therapy.  He has been prescribed a LifeVest.  He remains of breath with above average exertion, but feels this remains at baseline.  He continues to deny any chest pain, palpitations, lightheadedness, apnea, PND, calf pain, or swelling of the ankles.  He gives a history of previous MI treated in [place].  He continues to be followed by cardiology, and is currently prescribed metoprolol succinate, sacubitril-valsartan, spironolactone, dapagliflozin, and vericiguat.  He underwent an updated echocardiogram on 6/16/2024 with no significant changes.  He underwent an electrophysiology assessment on 7/16/2024 and placement of an ICD as planned.  Unfortunately, he continues to smoke, but has reduced from 2 packs/day to 1 pack/day.      Essential Hypertension  He gives a long history of hypertension.  He is currently on metoprolol succinate, valsartan, and spironolactone  Lab Results   Component Value Date    GLUCOSE 108 (H) 06/09/2024    BUN 21 (H) 06/09/2024    CREATININE 1.04 06/09/2024     06/09/2024    K 4.3 06/09/2024     06/09/2024    CALCIUM 9.0 06/09/2024    PROTEINTOT 6.7 06/09/2024    ALBUMIN 4.0 06/09/2024    ALT 18 06/09/2024    AST 17 06/09/2024    ALKPHOS 74 06/09/2024    BILITOT 0.2 06/09/2024    GLOB 2.7 06/09/2024    AGRATIO 1.5 06/09/2024    BCR 20.2 06/09/2024     ANIONGAP 11.4 06/09/2024    EGFR 83.7 06/09/2024     Lab Results   Component Value Date    ALKPHOS 74 06/09/2024     Hyperlipidemia  He is currently on atorvastatin  Lab Results   Component Value Date    CHOL 103 06/09/2024    TRIG 101 06/09/2024    HDL 29 (L) 06/09/2024    LDL 55 06/09/2024     Prediabetes  His A1c was elevated in hospital.  He states his glucose has never been high in the past.  He is currently on dapagliflozin for HFrEF  Lab Results   Component Value Date    HGBA1C 5.10 06/09/2024     Iron Deficiency Anemia  He denies any bleeding whatsoever.  He was started on iron supplementation following his most recent labs and referral was made to general surgery for GI endoscopy.  An appointment has yet to be finalized  Lab Results   Component Value Date    WBC 7.12 06/09/2024    HGB 8.9 (L) 06/09/2024    HCT 28.6 (L) 06/09/2024    MCV 90.2 06/09/2024     06/09/2024     Lab Results   Component Value Date    IRON 18 (L) 06/09/2024    TRANSFERRIN 300 06/09/2024    FERRITIN 12.51 (L) 06/09/2024     Lab Results   Component Value Date    RETICCTPCT 5.10 (H) 06/09/2024     Lab Results   Component Value Date    FRJPWMHM55 369 06/09/2024     Smoker  He gives a 50-pack-year history and is currently averaging 1 pack/day.     Labs  Lab Results   Component Value Date    TSH 6.010 (H) 06/09/2024     The following portions of the patient's history were reviewed and updated as appropriate: allergies, current medications, past medical history, past social history, and problem list.    Review of Systems   Constitutional:  Positive for fatigue. Negative for chills and fever.   HENT:  Negative for congestion, ear pain, hearing loss, postnasal drip, rhinorrhea, sinus pressure, sneezing, sore throat and voice change.    Eyes:  Negative for visual disturbance.   Respiratory:  Positive for shortness of breath. Negative for cough and wheezing.    Cardiovascular:  Negative for chest pain, palpitations and leg swelling.    Gastrointestinal:  Negative for abdominal pain, blood in stool, constipation, diarrhea, nausea, vomiting and GERD.   Genitourinary:  Positive for nocturia (2-3). Negative for dysuria, frequency, hematuria and urgency.   Musculoskeletal:  Negative for arthralgias, back pain, joint swelling, myalgias and neck pain.   Skin:  Negative for rash.   Neurological:  Negative for weakness and numbness.   Psychiatric/Behavioral:  Positive for sleep disturbance. Negative for suicidal ideas and depressed mood. The patient is not nervous/anxious.      Objective   Physical Exam  Constitutional:       General: He is not in acute distress.     Appearance: Normal appearance. He is well-developed. He is not ill-appearing or diaphoretic.      Comments: Appeared a bit older than stated age.  Alert and in fair spirits.  Wearing a LifeVest.  No apparent distress. No pallor, jaundice, diaphoresis, or cyanosis.     HENT:      Head: Atraumatic.      Right Ear: Tympanic membrane, ear canal and external ear normal.      Left Ear: Tympanic membrane, ear canal and external ear normal.      Mouth/Throat:      Lips: No lesions.      Mouth: Mucous membranes are moist. No oral lesions.      Dentition: Dental caries present.      Pharynx: No oropharyngeal exudate or posterior oropharyngeal erythema.   Eyes:      General: Lids are normal. Gaze aligned appropriately.      Extraocular Movements: Extraocular movements intact.      Conjunctiva/sclera: Conjunctivae normal.      Pupils: Pupils are equal.   Neck:      Thyroid: No thyroid mass or thyromegaly.      Vascular: No carotid bruit.      Trachea: Trachea normal. No tracheal deviation.   Cardiovascular:      Rate and Rhythm: Normal rate and regular rhythm.      Pulses:           Dorsalis pedis pulses are 2+ on the right side and 2+ on the left side.        Posterior tibial pulses are 2+ on the right side and 2+ on the left side.      Heart sounds: Normal heart sounds, S1 normal and S2 normal. No  murmur heard.     No gallop.      Comments: No calf tenderness  Pulmonary:      Effort: Pulmonary effort is normal.      Breath sounds: Wheezing (diffuse - mild) present.      Comments: Pulmonary hyperinflation  Abdominal:      General: Bowel sounds are normal. There is no distension.   Musculoskeletal:      Right lower leg: No edema.      Left lower leg: No edema.   Lymphadenopathy:      Head:      Right side of head: No submental, submandibular, tonsillar, preauricular, posterior auricular or occipital adenopathy.      Left side of head: No submental, submandibular, tonsillar, preauricular, posterior auricular or occipital adenopathy.      Cervical: No cervical adenopathy.      Upper Body:      Right upper body: No supraclavicular adenopathy.      Left upper body: No supraclavicular adenopathy.   Skin:     General: Skin is warm and dry.      Coloration: Skin is not cyanotic, jaundiced or pale.      Findings: No rash.      Nails: There is no clubbing.   Neurological:      Mental Status: He is alert and oriented to person, place, and time.      Cranial Nerves: No dysarthria or facial asymmetry.      Sensory: No sensory deficit.      Motor: No tremor.      Coordination: Coordination normal.      Gait: Gait normal.   Psychiatric:         Attention and Perception: Attention normal.         Mood and Affect: Mood normal.         Speech: Speech normal.         Behavior: Behavior normal.         Thought Content: Thought content normal.       Assessment & Plan   Problems Addressed this Visit          Cardiac and Vasculature    Coronary artery disease involving native coronary artery of native heart without angina pectoris (Chronic)  Reminded regarding risk factor modification with an emphasis on tobacco cessation.  Continue low-dose ASA.  Follow up with cardiology     Essential hypertension (Chronic)   Hypertension:  BP remains a bit low . Evidence of target organ damage: coronary artery disease and heart  failure.  Encouraged to continue to work on diet and exercise plan.   Continue current medication    Relevant Orders    Comprehensive Metabolic Panel    HFrEF (heart failure with reduced ejection fraction) (Chronic)  Congestive heart failure due to coronary artery disease (CAD) and systolic dysfunction.  Heart failure is stable.  Reminded regarding the importance of lifestyle modification.  Continue current medication  Follow up with electrophysiology    Relevant Orders    Comprehensive Metabolic Panel    Mixed hyperlipidemia   As above.   Continue current medication.    Relevant Orders    Comprehensive Metabolic Panel    Lipid Panel       Endocrine and Metabolic    Prediabetes  As above.   Continue current medication.    Relevant Orders    Comprehensive Metabolic Panel       Health Encounters    Healthcare maintenance  Patient agreed to a Prevnar 20.  Will discuss influenza, Shingrix, and an updated Tdap again at his return  Reminded of the potential benefits of lung cancer screening    Relevant Orders    Pneumococcal Conjugate Vaccine 20-Valent All (Completed)       Hematology and Neoplasia    Iron deficiency anemia due to chronic blood loss  Advised that this is likely due to occult blood loss and that the differential diagnosis includes an underlying GI malignancy.  Recommended GI endoscopy once he has recovered from his ICD placement and is cleared by cardiology  Will continue iron supplementation  Updated labs drawn    Relevant Orders    CBC & Differential    Iron    Transferrin    Ferritin       Infectious Diseases    Encounter for immunization    Relevant Orders    Pneumococcal Conjugate Vaccine 20-Valent All (Completed)       Pulmonary and Pneumonias    Mixed type COPD (chronic obstructive pulmonary disease)   COPD is stable.  Reminded of the importance of smoking cessation  Encouraged to remain as active as symptoms allow for       Tobacco    Smoker     Diagnoses         Codes Comments    Mixed  hyperlipidemia    -  Primary ICD-10-CM: E78.2  ICD-9-CM: 272.2     HFrEF (heart failure with reduced ejection fraction)     ICD-10-CM: I50.20  ICD-9-CM: 428.20     Essential hypertension     ICD-10-CM: I10  ICD-9-CM: 401.9     Coronary artery disease involving native coronary artery of native heart without angina pectoris     ICD-10-CM: I25.10  ICD-9-CM: 414.01     Prediabetes     ICD-10-CM: R73.03  ICD-9-CM: 790.29     Healthcare maintenance     ICD-10-CM: Z00.00  ICD-9-CM: V70.0     Iron deficiency anemia due to chronic blood loss     ICD-10-CM: D50.0  ICD-9-CM: 280.0     Mixed type COPD (chronic obstructive pulmonary disease)     ICD-10-CM: J44.9  ICD-9-CM: 496     Smoker     ICD-10-CM: F17.200  ICD-9-CM: 305.1     Encounter for immunization     ICD-10-CM: Z23  ICD-9-CM: V03.89

## 2024-08-13 DIAGNOSIS — D50.0 IRON DEFICIENCY ANEMIA DUE TO CHRONIC BLOOD LOSS: ICD-10-CM

## 2024-08-13 LAB
ALBUMIN SERPL-MCNC: 4.1 G/DL (ref 3.5–5.2)
ALBUMIN/GLOB SERPL: 1.5 G/DL
ALP SERPL-CCNC: 68 U/L (ref 39–117)
ALT SERPL W P-5'-P-CCNC: 56 U/L (ref 1–41)
ANION GAP SERPL CALCULATED.3IONS-SCNC: 9.1 MMOL/L (ref 5–15)
AST SERPL-CCNC: 44 U/L (ref 1–40)
BASOPHILS # BLD AUTO: 0.08 10*3/MM3 (ref 0–0.2)
BASOPHILS NFR BLD AUTO: 1.2 % (ref 0–1.5)
BILIRUB SERPL-MCNC: <0.2 MG/DL (ref 0–1.2)
BUN SERPL-MCNC: 25 MG/DL (ref 6–20)
BUN/CREAT SERPL: 22.5 (ref 7–25)
CALCIUM SPEC-SCNC: 9 MG/DL (ref 8.6–10.5)
CHLORIDE SERPL-SCNC: 108 MMOL/L (ref 98–107)
CHOLEST SERPL-MCNC: 102 MG/DL (ref 0–200)
CO2 SERPL-SCNC: 20.9 MMOL/L (ref 22–29)
CREAT SERPL-MCNC: 1.11 MG/DL (ref 0.76–1.27)
DEPRECATED RDW RBC AUTO: 53 FL (ref 37–54)
EGFRCR SERPLBLD CKD-EPI 2021: 77.5 ML/MIN/1.73
EOSINOPHIL # BLD AUTO: 0.21 10*3/MM3 (ref 0–0.4)
EOSINOPHIL NFR BLD AUTO: 3.2 % (ref 0.3–6.2)
ERYTHROCYTE [DISTWIDTH] IN BLOOD BY AUTOMATED COUNT: 17.9 % (ref 12.3–15.4)
FERRITIN SERPL-MCNC: 19.8 NG/ML (ref 30–400)
GLOBULIN UR ELPH-MCNC: 2.7 GM/DL
GLUCOSE SERPL-MCNC: 112 MG/DL (ref 65–99)
HCT VFR BLD AUTO: 30.6 % (ref 37.5–51)
HDLC SERPL-MCNC: 30 MG/DL (ref 40–60)
HGB BLD-MCNC: 9.4 G/DL (ref 13–17.7)
IMM GRANULOCYTES # BLD AUTO: 0.1 10*3/MM3 (ref 0–0.05)
IMM GRANULOCYTES NFR BLD AUTO: 1.5 % (ref 0–0.5)
IRON 24H UR-MRATE: 61 MCG/DL (ref 59–158)
LDLC SERPL CALC-MCNC: 48 MG/DL (ref 0–100)
LDLC/HDLC SERPL: 1.52 {RATIO}
LYMPHOCYTES # BLD AUTO: 1.8 10*3/MM3 (ref 0.7–3.1)
LYMPHOCYTES NFR BLD AUTO: 27.4 % (ref 19.6–45.3)
MCH RBC QN AUTO: 25.3 PG (ref 26.6–33)
MCHC RBC AUTO-ENTMCNC: 30.7 G/DL (ref 31.5–35.7)
MCV RBC AUTO: 82.3 FL (ref 79–97)
MONOCYTES # BLD AUTO: 0.58 10*3/MM3 (ref 0.1–0.9)
MONOCYTES NFR BLD AUTO: 8.8 % (ref 5–12)
NEUTROPHILS NFR BLD AUTO: 3.81 10*3/MM3 (ref 1.7–7)
NEUTROPHILS NFR BLD AUTO: 57.9 % (ref 42.7–76)
NRBC BLD AUTO-RTO: 0.3 /100 WBC (ref 0–0.2)
PLATELET # BLD AUTO: 230 10*3/MM3 (ref 140–450)
PMV BLD AUTO: 12.1 FL (ref 6–12)
POTASSIUM SERPL-SCNC: 4.1 MMOL/L (ref 3.5–5.2)
PROT SERPL-MCNC: 6.8 G/DL (ref 6–8.5)
RBC # BLD AUTO: 3.72 10*6/MM3 (ref 4.14–5.8)
SODIUM SERPL-SCNC: 138 MMOL/L (ref 136–145)
TRANSFERRIN SERPL-MCNC: 324 MG/DL (ref 200–360)
TRIGL SERPL-MCNC: 132 MG/DL (ref 0–150)
VLDLC SERPL-MCNC: 24 MG/DL (ref 5–40)
WBC NRBC COR # BLD AUTO: 6.58 10*3/MM3 (ref 3.4–10.8)

## 2024-08-13 RX ORDER — ASCORBIC ACID 500 MG
TABLET ORAL
Qty: 60 TABLET | Refills: 2 | Status: SHIPPED | OUTPATIENT
Start: 2024-08-13 | End: 2024-08-15 | Stop reason: SDUPTHER

## 2024-08-13 RX ORDER — FERROUS SULFATE 325(65) MG
325 TABLET ORAL 2 TIMES DAILY
Qty: 60 TABLET | Refills: 2 | Status: SHIPPED | OUTPATIENT
Start: 2024-08-13 | End: 2024-08-15 | Stop reason: SDUPTHER

## 2024-08-14 ENCOUNTER — SPECIALTY PHARMACY (OUTPATIENT)
Dept: PHARMACY | Facility: HOSPITAL | Age: 59
End: 2024-08-14
Payer: COMMERCIAL

## 2024-08-14 RX ORDER — ATORVASTATIN CALCIUM 40 MG/1
40 TABLET, FILM COATED ORAL NIGHTLY
Qty: 90 TABLET | Refills: 0 | Status: CANCELLED | OUTPATIENT
Start: 2024-08-14 | End: 2024-11-12

## 2024-08-15 DIAGNOSIS — D50.0 IRON DEFICIENCY ANEMIA DUE TO CHRONIC BLOOD LOSS: ICD-10-CM

## 2024-08-15 RX ORDER — FERROUS SULFATE 325(65) MG
325 TABLET ORAL 2 TIMES DAILY
Qty: 60 TABLET | Refills: 2 | Status: SHIPPED | OUTPATIENT
Start: 2024-08-15

## 2024-08-15 RX ORDER — ATORVASTATIN CALCIUM 40 MG/1
40 TABLET, FILM COATED ORAL NIGHTLY
Qty: 90 TABLET | Refills: 2 | Status: SHIPPED | OUTPATIENT
Start: 2024-08-15

## 2024-08-15 RX ORDER — ASCORBIC ACID 500 MG
TABLET ORAL
Qty: 60 TABLET | Refills: 2 | Status: SHIPPED | OUTPATIENT
Start: 2024-08-15

## 2024-08-19 ENCOUNTER — TELEPHONE (OUTPATIENT)
Dept: CARDIOLOGY | Facility: CLINIC | Age: 59
End: 2024-08-19
Payer: COMMERCIAL

## 2024-08-19 DIAGNOSIS — I50.20 HFREF (HEART FAILURE WITH REDUCED EJECTION FRACTION): Primary | Chronic | ICD-10-CM

## 2024-08-19 NOTE — TELEPHONE ENCOUNTER
Pt called stating he was supposed to be scheduled for a procedure. Per your note on 7/16, a single chamber ICD with atrial sensing. Are there any meds to hold?

## 2024-08-23 ENCOUNTER — PREP FOR SURGERY (OUTPATIENT)
Dept: OTHER | Facility: HOSPITAL | Age: 59
End: 2024-08-23
Payer: COMMERCIAL

## 2024-08-23 RX ORDER — SODIUM CHLORIDE 0.9 % (FLUSH) 0.9 %
3 SYRINGE (ML) INJECTION EVERY 12 HOURS SCHEDULED
OUTPATIENT
Start: 2024-08-23

## 2024-08-23 RX ORDER — NITROGLYCERIN 0.4 MG/1
0.4 TABLET SUBLINGUAL
OUTPATIENT
Start: 2024-08-23

## 2024-08-23 RX ORDER — CEFAZOLIN SODIUM 2 G/100ML
2 INJECTION, SOLUTION INTRAVENOUS ONCE
OUTPATIENT
Start: 2024-08-23 | End: 2024-08-23

## 2024-08-23 RX ORDER — SODIUM CHLORIDE 0.9 % (FLUSH) 0.9 %
10 SYRINGE (ML) INJECTION AS NEEDED
OUTPATIENT
Start: 2024-08-23

## 2024-08-23 RX ORDER — SODIUM CHLORIDE 9 MG/ML
40 INJECTION, SOLUTION INTRAVENOUS AS NEEDED
OUTPATIENT
Start: 2024-08-23

## 2024-08-23 RX ORDER — ONDANSETRON 2 MG/ML
4 INJECTION INTRAMUSCULAR; INTRAVENOUS EVERY 6 HOURS PRN
OUTPATIENT
Start: 2024-08-23

## 2024-08-23 RX ORDER — ACETAMINOPHEN 325 MG/1
650 TABLET ORAL EVERY 4 HOURS PRN
OUTPATIENT
Start: 2024-08-23

## 2024-09-20 ENCOUNTER — SPECIALTY PHARMACY (OUTPATIENT)
Dept: PHARMACY | Facility: HOSPITAL | Age: 59
End: 2024-09-20
Payer: COMMERCIAL

## 2024-10-07 ENCOUNTER — HOSPITAL ENCOUNTER (OUTPATIENT)
Facility: HOSPITAL | Age: 59
Setting detail: HOSPITAL OUTPATIENT SURGERY
Discharge: HOME | End: 2024-10-07
Attending: STUDENT IN AN ORGANIZED HEALTH CARE EDUCATION/TRAINING PROGRAM | Admitting: STUDENT IN AN ORGANIZED HEALTH CARE EDUCATION/TRAINING PROGRAM
Payer: COMMERCIAL

## 2024-10-07 ENCOUNTER — APPOINTMENT (OUTPATIENT)
Dept: GENERAL RADIOLOGY | Facility: HOSPITAL | Age: 59
End: 2024-10-07
Payer: COMMERCIAL

## 2024-10-07 VITALS
HEIGHT: 71 IN | OXYGEN SATURATION: 94 % | TEMPERATURE: 97.1 F | DIASTOLIC BLOOD PRESSURE: 72 MMHG | SYSTOLIC BLOOD PRESSURE: 94 MMHG | WEIGHT: 224.8 LBS | RESPIRATION RATE: 7 BRPM | HEART RATE: 66 BPM | BODY MASS INDEX: 31.47 KG/M2

## 2024-10-07 DIAGNOSIS — I50.20 HFREF (HEART FAILURE WITH REDUCED EJECTION FRACTION): ICD-10-CM

## 2024-10-07 LAB
ANION GAP SERPL CALCULATED.3IONS-SCNC: 10 MMOL/L (ref 5–15)
BUN SERPL-MCNC: 19 MG/DL (ref 6–20)
BUN/CREAT SERPL: 19.8 (ref 7–25)
CALCIUM SPEC-SCNC: 8.6 MG/DL (ref 8.6–10.5)
CHLORIDE SERPL-SCNC: 107 MMOL/L (ref 98–107)
CO2 SERPL-SCNC: 21 MMOL/L (ref 22–29)
CREAT SERPL-MCNC: 0.96 MG/DL (ref 0.76–1.27)
DEPRECATED RDW RBC AUTO: 52.7 FL (ref 37–54)
EGFRCR SERPLBLD CKD-EPI 2021: 92.2 ML/MIN/1.73
ERYTHROCYTE [DISTWIDTH] IN BLOOD BY AUTOMATED COUNT: 19.2 % (ref 12.3–15.4)
GLUCOSE SERPL-MCNC: 102 MG/DL (ref 65–99)
HCT VFR BLD AUTO: 30.8 % (ref 37.5–51)
HGB BLD-MCNC: 8.5 G/DL (ref 13–17.7)
MAGNESIUM SERPL-MCNC: 2.2 MG/DL (ref 1.6–2.6)
MCH RBC QN AUTO: 21 PG (ref 26.6–33)
MCHC RBC AUTO-ENTMCNC: 27.6 G/DL (ref 31.5–35.7)
MCV RBC AUTO: 76.2 FL (ref 79–97)
PLATELET # BLD AUTO: 211 10*3/MM3 (ref 140–450)
PMV BLD AUTO: 11.6 FL (ref 6–12)
POTASSIUM SERPL-SCNC: 3.9 MMOL/L (ref 3.5–5.2)
RBC # BLD AUTO: 4.04 10*6/MM3 (ref 4.14–5.8)
SODIUM SERPL-SCNC: 138 MMOL/L (ref 136–145)
WBC NRBC COR # BLD AUTO: 7.1 10*3/MM3 (ref 3.4–10.8)

## 2024-10-07 PROCEDURE — 99152 MOD SED SAME PHYS/QHP 5/>YRS: CPT | Performed by: STUDENT IN AN ORGANIZED HEALTH CARE EDUCATION/TRAINING PROGRAM

## 2024-10-07 PROCEDURE — 85027 COMPLETE CBC AUTOMATED: CPT

## 2024-10-07 PROCEDURE — 33249 INSJ/RPLCMT DEFIB W/LEAD(S): CPT | Performed by: STUDENT IN AN ORGANIZED HEALTH CARE EDUCATION/TRAINING PROGRAM

## 2024-10-07 PROCEDURE — 83735 ASSAY OF MAGNESIUM: CPT

## 2024-10-07 PROCEDURE — 80048 BASIC METABOLIC PNL TOTAL CA: CPT

## 2024-10-07 PROCEDURE — 71046 X-RAY EXAM CHEST 2 VIEWS: CPT

## 2024-10-07 PROCEDURE — 25010000002 ONDANSETRON PER 1 MG: Performed by: STUDENT IN AN ORGANIZED HEALTH CARE EDUCATION/TRAINING PROGRAM

## 2024-10-07 PROCEDURE — C1777 LEAD, AICD, ENDO SINGLE COIL: HCPCS | Performed by: STUDENT IN AN ORGANIZED HEALTH CARE EDUCATION/TRAINING PROGRAM

## 2024-10-07 PROCEDURE — 25010000002 CEFAZOLIN PER 500 MG: Performed by: STUDENT IN AN ORGANIZED HEALTH CARE EDUCATION/TRAINING PROGRAM

## 2024-10-07 PROCEDURE — 99153 MOD SED SAME PHYS/QHP EA: CPT | Performed by: STUDENT IN AN ORGANIZED HEALTH CARE EDUCATION/TRAINING PROGRAM

## 2024-10-07 PROCEDURE — 25010000002 FENTANYL CITRATE (PF) 50 MCG/ML SOLUTION: Performed by: STUDENT IN AN ORGANIZED HEALTH CARE EDUCATION/TRAINING PROGRAM

## 2024-10-07 PROCEDURE — C1892 INTRO/SHEATH,FIXED,PEEL-AWAY: HCPCS | Performed by: STUDENT IN AN ORGANIZED HEALTH CARE EDUCATION/TRAINING PROGRAM

## 2024-10-07 PROCEDURE — 25010000002 LIDOCAIN 0.5%-EPINEPHRINE 1:200000 0.5 %-1:200000 SOLUTION: Performed by: STUDENT IN AN ORGANIZED HEALTH CARE EDUCATION/TRAINING PROGRAM

## 2024-10-07 PROCEDURE — 25010000002 MIDAZOLAM PER 1 MG: Performed by: STUDENT IN AN ORGANIZED HEALTH CARE EDUCATION/TRAINING PROGRAM

## 2024-10-07 PROCEDURE — C1722 AICD, SINGLE CHAMBER: HCPCS | Performed by: STUDENT IN AN ORGANIZED HEALTH CARE EDUCATION/TRAINING PROGRAM

## 2024-10-07 DEVICE — IMPLANTABLE DEVICE: Type: IMPLANTABLE DEVICE | Site: HEART | Status: FUNCTIONAL

## 2024-10-07 DEVICE — IMPLANTABLE DEVICE
Type: IMPLANTABLE DEVICE | Site: HEART | Status: FUNCTIONAL
Brand: ACTICOR 7 VR-T DX

## 2024-10-07 RX ORDER — SODIUM CHLORIDE 0.9 % (FLUSH) 0.9 %
10 SYRINGE (ML) INJECTION AS NEEDED
Status: DISCONTINUED | OUTPATIENT
Start: 2024-10-07 | End: 2024-10-07 | Stop reason: HOSPADM

## 2024-10-07 RX ORDER — SODIUM CHLORIDE 0.9 % (FLUSH) 0.9 %
3 SYRINGE (ML) INJECTION EVERY 12 HOURS SCHEDULED
Status: DISCONTINUED | OUTPATIENT
Start: 2024-10-07 | End: 2024-10-07 | Stop reason: HOSPADM

## 2024-10-07 RX ORDER — MIDAZOLAM HYDROCHLORIDE 1 MG/ML
INJECTION INTRAMUSCULAR; INTRAVENOUS
Status: DISCONTINUED | OUTPATIENT
Start: 2024-10-07 | End: 2024-10-07 | Stop reason: HOSPADM

## 2024-10-07 RX ORDER — ETOMIDATE 2 MG/ML
INJECTION INTRAVENOUS
Status: DISCONTINUED | OUTPATIENT
Start: 2024-10-07 | End: 2024-10-07 | Stop reason: HOSPADM

## 2024-10-07 RX ORDER — LIDOCAINE HYDROCHLORIDE AND EPINEPHRINE 5; 5 MG/ML; UG/ML
INJECTION, SOLUTION INFILTRATION; PERINEURAL
Status: DISCONTINUED | OUTPATIENT
Start: 2024-10-07 | End: 2024-10-07 | Stop reason: HOSPADM

## 2024-10-07 RX ORDER — ACETAMINOPHEN 325 MG/1
650 TABLET ORAL EVERY 4 HOURS PRN
Status: DISCONTINUED | OUTPATIENT
Start: 2024-10-07 | End: 2024-10-07 | Stop reason: HOSPADM

## 2024-10-07 RX ORDER — ONDANSETRON 2 MG/ML
4 INJECTION INTRAMUSCULAR; INTRAVENOUS EVERY 6 HOURS PRN
Status: DISCONTINUED | OUTPATIENT
Start: 2024-10-07 | End: 2024-10-07 | Stop reason: HOSPADM

## 2024-10-07 RX ORDER — FENTANYL CITRATE 50 UG/ML
INJECTION, SOLUTION INTRAMUSCULAR; INTRAVENOUS
Status: DISCONTINUED | OUTPATIENT
Start: 2024-10-07 | End: 2024-10-07 | Stop reason: HOSPADM

## 2024-10-07 RX ORDER — ONDANSETRON 2 MG/ML
INJECTION INTRAMUSCULAR; INTRAVENOUS
Status: DISCONTINUED | OUTPATIENT
Start: 2024-10-07 | End: 2024-10-07 | Stop reason: HOSPADM

## 2024-10-07 RX ORDER — NITROGLYCERIN 0.4 MG/1
0.4 TABLET SUBLINGUAL
Status: DISCONTINUED | OUTPATIENT
Start: 2024-10-07 | End: 2024-10-07 | Stop reason: HOSPADM

## 2024-10-07 RX ORDER — SODIUM CHLORIDE 9 MG/ML
40 INJECTION, SOLUTION INTRAVENOUS AS NEEDED
Status: DISCONTINUED | OUTPATIENT
Start: 2024-10-07 | End: 2024-10-07 | Stop reason: HOSPADM

## 2024-10-07 NOTE — DISCHARGE INSTRUCTIONS
DR. HERNANDEZ DEVICE IMPLANTATION  Please do not lift more than 10 pounds or raise the affected arm above the shoulder for 6 weeks   after the device was implanted (this does not apply to subcutaneous ICDs).  Avoid activities that involve heavy lifting or rough contact that could result in blows to your   implant site. This allows the incision time to heal.  No showering or getting the incision wet for 3 days post-operatively.   Keep your dressing on. The office will remove it at your wound check appointment.   Do not apply creams, lotions or powders to the incision.   Please avoid allowing a bra strap or suspenders to lay over the incision until it is completely   healed.   Wear a sling for the first 24 hours then only when sleeping for four weeks.   No driving for 24 hours post-operatively.   Call your doctor if you have any swelling, redness or discharge around your incision, notice   anything unusual or unexpected or you develop a fever that does not go away in two to three   days.   Restart your blood thinner in 3 days.  Call your doctor if you hear any beeping sounds/vibratory alerts from your device as this   indicates your device needs to be checked immediately.  You should be scheduled for a wound check in 1 week.   Carry your medical device ID card with you at all times.   Please call our office at [phone] with any questions about the device or incision

## 2024-10-07 NOTE — H&P
Pre-ICD Implantation History and Physical  [facility]      Patient:  Dave Townsend  :  1965  MRN: [id number]    PCP:  Claudio Jane MD  PHONE:  [phone]    DATE: 10/7/2024  ID: Dave Townsend is a 59 y.o. male from [facility]    CC: Heart failure, weakness    Problem List:  CAD  [facility] Dr. Mendosa 3/26/2024: Mid RCA 60% stenosis, distal % stenosis s/p stent, PLB 95% stenosis s/p stent  Ischemic cardiomyopathy  TTE 3/2024: LVEF 26-30%, normal diastolic function, mild MR  TTE 2024: LVEF 26-30%  Hypertension  Dyslipidemia      BRIEF HPI:   Mr. Townsend is a 59-year-old male with the above medical history who presents for single-chamber ICD implantation in the setting of ischemic cardiomyopathy with persistent LVEF <35% despite optimized GDMT. Patient had a STEMI in March PCI to RCA and posterolateral branches. His EF was 26-30% on 3/28/2024. He has been wearing LifeVest since that time and on GDMT. Repeat echocardiogram 2024 showed no improvement with LVEF still <35%.  Since we saw the patient in July, he reports no significant health changes or hospitalizations.  He continues to have fatigue and shortness of breath on exertion.  Denies chest pain, lightheadedness/dizziness, edema or syncopal episode.    Allergies:      No Known Allergies    MEDICATIONS:  Current Outpatient Medications   Medication Instructions    Aspirin Low Dose 81 mg, Oral, Daily    atorvastatin (LIPITOR) 40 mg, Oral, Nightly    Brilinta 90 mg, Oral, 2 Times Daily    dapagliflozin Propanediol 10 MG tablet Take 1 tablet by mouth Daily for 180 days.    ferrous sulfate 325 (65 FE) MG tablet Take 1 tablet by mouth daily for one week, then 1 tablet twice daily afterward    metoprolol succinate XL (TOPROL-XL) 25 MG 24 hr tablet Take ½ tablet by mouth Daily.    nitroglycerin (NITROSTAT) 0.4 MG SL tablet Place 1 tablet under the tongue Every 5 minutes as Needed for Chest Pain  "(Systolic BP Greater Than 100). Take no more than 3 doses in 15 minutes.    pantoprazole (PROTONIX) 40 mg, Oral, Daily    sacubitril-valsartan (Entresto) 24-26 MG tablet Take 1 tablet by mouth 2 Times a Day for 90 days.    spironolactone (ALDACTONE) 25 MG tablet Take ½ tablet by mouth Daily.    Verquvo 10 mg, Oral, Daily    vitamin C (ASCORBIC ACID) 500 MG tablet Take 1 tablet by mouth with each dose of ferrous sulfate       Past medical & surgical history, social and family history reviewed in the electronic medical record.    ROS: Pertinent positives listed in the HPI and problem list above. All others reviewed and negative.     Physical Exam:   /70 (BP Location: Left arm, Patient Position: Lying)   Pulse 79   Temp 97.1 °F (36.2 °C) (Temporal)   Resp 18   Ht 180.3 cm (71\")   Wt 102 kg (224 lb 12.8 oz)   SpO2 99%   BMI 31.35 kg/m²     Constitutional:    Well-appearing 59 y.o. y/o adult  in no acute distress        Heart:    Regular rhythm and normal rate, no murmurs, rubs or gallops   Lungs:     Clear to auscultation bilaterally, no wheezes, rhales or rhonchi, nonlabored respirations       Extremities:   No gross deformities, no edema, clubbing, or cyanosis.    Pulses:    Neuro:  Psych:   Radial pulses palpable and equal bilaterally.  No gross focal deficits  Mood and behavior appropriate for situation       Labs and Diagnostic Data:  Lab Results   Component Value Date    GLUCOSE 112 (H) 08/12/2024    BUN 25 (H) 08/12/2024    CREATININE 1.11 08/12/2024     08/12/2024    K 4.1 08/12/2024     (H) 08/12/2024    CALCIUM 9.0 08/12/2024    PROTEINTOT 6.8 08/12/2024    ALBUMIN 4.1 08/12/2024    ALT 56 (H) 08/12/2024    AST 44 (H) 08/12/2024    ALKPHOS 68 08/12/2024    BILITOT <0.2 08/12/2024    GLOB 2.7 08/12/2024    AGRATIO 1.5 08/12/2024    BCR 22.5 08/12/2024    ANIONGAP 9.1 08/12/2024    EGFR 77.5 08/12/2024     Lab Results   Component Value Date    TSH 6.010 (H) 06/09/2024     Lab Results "   Component Value Date    WBC 7.10 10/07/2024    HGB 8.5 (L) 10/07/2024    HCT 30.8 (L) 10/07/2024    MCV 76.2 (L) 10/07/2024     10/07/2024     Lab Results   Component Value Date    HGBA1C 5.10 06/09/2024     Lab Results   Component Value Date    CHOL 102 08/12/2024    TRIG 132 08/12/2024    HDL 30 (L) 08/12/2024    LDL 48 08/12/2024           Tele: NSR    IMPRESSION:  Mr. Townsend is a 59-year-old male with a history of ischemic cardiomyopathy with persistently reduced LVEF <35% despite optimally tolerated GDMT who presents for single-chamber ICD implantation.  Decision making packet regarding ICD implantation for the primary prevention of sudden cardiac death was provided for the patient today.  No meds to hold    PLAN:  Procedure to perform: Single-chamber ICD implantation. Risks, benefits and alternatives to the procedure explained to the patient and he understands and wishes to proceed.     Electronically signed by Gal Sosa PA-C, 10/07/24, 1:01 PM EDT.

## 2024-10-08 ENCOUNTER — CALL CENTER PROGRAMS (OUTPATIENT)
Dept: CALL CENTER | Facility: HOSPITAL | Age: 59
End: 2024-10-08
Payer: COMMERCIAL

## 2024-10-08 NOTE — OUTREACH NOTE
PCI/Device Survey      Flowsheet Row Responses   Facility patient discharged from? [facility]   Procedure date 10/07/24   Procedure (if device, specify in description) Device   Device Description Lead LD ICD PAMIRA/S/DX 65/15 - [id number] Implanted    ICD ICD ACTICOR 7 VRT DX - [id number] Implanted   Performing MD Dr. Haja Esteban   Attempt successful? Yes   Call start time 1202   Call end time 1207   Person spoke with today (if not patient) and relationship Patient   Has the patient had any of the following symptoms since discharge? --  [Patient reports chest is sore, but no other symptoms.]   Is the patient taking prescribed medications: ASA, Brillinta   Nursing intervention Reminded to continue to take prescribed medications   Does the patient have any of the following symptoms related to the cath/surgical site? --  [Dressing intact and clean to chest.]   Does the patient have an appointment scheduled with the cardiologist? Yes   Appointment comments Patient has appt with Lashonda Sharif on 10/30  130pm in [place]. Advised to contact Dr James office for follow up appts.   If the patient is a current smoker, are they able to teach back resources for cessation? Smoking cessation medications  [Patient is current smoker trying to quit. He reports that he has tried patches but didn't work for him. He is trying to reduce amount he smokes.]   Did the patient feel prepared to go home on the same day as the procedure? Yes   Is the patient satisfied with the same day discharge process? Yes   PCI/Device call completed Yes   Wrap up additional comments Patient reports doing well today,  just complains of soreness.            JOVANA DAUGHERTY - Registered Nurse

## 2024-10-22 ENCOUNTER — OFFICE VISIT (OUTPATIENT)
Dept: FAMILY MEDICINE CLINIC | Facility: CLINIC | Age: 59
End: 2024-10-22
Payer: COMMERCIAL

## 2024-10-22 DIAGNOSIS — I10 ESSENTIAL HYPERTENSION: Chronic | ICD-10-CM

## 2024-10-22 DIAGNOSIS — Z00.00 HEALTHCARE MAINTENANCE: ICD-10-CM

## 2024-10-22 DIAGNOSIS — F17.200 SMOKER: ICD-10-CM

## 2024-10-22 DIAGNOSIS — I50.20 HFREF (HEART FAILURE WITH REDUCED EJECTION FRACTION): Chronic | ICD-10-CM

## 2024-10-22 DIAGNOSIS — I25.10 CORONARY ARTERY DISEASE INVOLVING NATIVE CORONARY ARTERY OF NATIVE HEART WITHOUT ANGINA PECTORIS: Chronic | ICD-10-CM

## 2024-10-22 DIAGNOSIS — R73.03 PREDIABETES: ICD-10-CM

## 2024-10-22 DIAGNOSIS — Z23 ENCOUNTER FOR IMMUNIZATION: ICD-10-CM

## 2024-10-22 DIAGNOSIS — E78.2 MIXED HYPERLIPIDEMIA: Primary | Chronic | ICD-10-CM

## 2024-10-22 DIAGNOSIS — D50.0 IRON DEFICIENCY ANEMIA DUE TO CHRONIC BLOOD LOSS: ICD-10-CM

## 2024-10-22 DIAGNOSIS — J44.9 MIXED TYPE COPD (CHRONIC OBSTRUCTIVE PULMONARY DISEASE): ICD-10-CM

## 2024-10-22 PROCEDURE — 99214 OFFICE O/P EST MOD 30 MIN: CPT | Performed by: GENERAL PRACTICE

## 2024-10-22 PROCEDURE — 90656 IIV3 VACC NO PRSV 0.5 ML IM: CPT | Performed by: GENERAL PRACTICE

## 2024-10-22 PROCEDURE — 90471 IMMUNIZATION ADMIN: CPT | Performed by: GENERAL PRACTICE

## 2024-10-22 NOTE — PROGRESS NOTES
Subjective   Dave Townsend is a 59 y.o. male.     Chief Complaint  He returns for a scheduled reassessment of multiple medical problems including coronary artery disease, HFrEF, essential hypertension, hyperlipidemia, prediabetes, and iron deficiency anemia    History of Present Illness     Coronary Artery Disease  He underwent stenting x 2 of the RCA by Dr. Mendosa on 3/26/2024 after presenting with an inferior STEMI.  His coronary angiogram was otherwise unremarkable. He gives a history of previous MI treated in [place]. He remains short of breath with above average exertion, but continues to deny any chest pain, palpitations, lightheadedness, orthopnea, PND, calf pain, or swelling of the ankles.  He remains on dual antiplatelet therapy and is scheduled to undergo a cardiology reassessment with MARITA Latham on 10/30/2024. Unfortunately, he continues to smoke, but has reduced from 2 packs/day to 1 pack/day.     HFrEF  An echocardiogram done at the time of his STEMI revealed mild to moderate left ventricular dilation with an estimated EF of 30%. He underwent an updated echocardiogram on 6/16/2024 with no significant changes. He underwent placement of an ICD by Dr. Esteban on 10/7/2024.  He admits to tenderness about the site.  He has not remove the dressing.  He has yet to be scheduled a follow-up visit.  He remains on metoprolol succinate, sacubitril-valsartan, spironolactone, dapagliflozin, and vericiguat.  He is scheduled to undergo a reassessment with the heart failure clinic on 11/14/2024    Essential Hypertension  He gives a long history of hypertension.  He is currently on metoprolol succinate, valsartan, and spironolactone  Lab Results   Component Value Date    GLUCOSE 102 (H) 10/07/2024    BUN 19 10/07/2024    CREATININE 0.96 10/07/2024     10/07/2024    K 3.9 10/07/2024     10/07/2024    CALCIUM 8.6 10/07/2024    PROTEINTOT 6.8 08/12/2024    ALBUMIN 4.1 08/12/2024    ALT 56 (H) 08/12/2024     AST 44 (H) 08/12/2024    ALKPHOS 68 08/12/2024    BILITOT <0.2 08/12/2024    GLOB 2.7 08/12/2024    AGRATIO 1.5 08/12/2024    BCR 19.8 10/07/2024    ANIONGAP 10.0 10/07/2024    EGFR 92.2 10/07/2024     Hyperlipidemia  He is currently on atorvastatin  Lab Results   Component Value Date    CHOL 102 08/12/2024    TRIG 132 08/12/2024    HDL 30 (L) 08/12/2024    LDL 48 08/12/2024     Prediabetes  His A1c was elevated in hospital.  He states his glucose has never been high in the past.  He is currently on dapagliflozin for HFrEF    Iron Deficiency Anemia  He denies any bleeding whatsoever.  He has been referred to general surgery for GI endoscopy but an appointment has never been made.  He would like to pursue this closer to his home in [place] if possible.  He is taking ferrous sulfate 325 once daily with no apparent side effects  Lab Results   Component Value Date    WBC 7.10 10/07/2024    HGB 8.5 (L) 10/07/2024    HCT 30.8 (L) 10/07/2024    MCV 76.2 (L) 10/07/2024     10/07/2024     Lab Results   Component Value Date    IRON 61 08/12/2024    TRANSFERRIN 324 08/12/2024    FERRITIN 19.80 (L) 08/12/2024     Smoker  He gives a 50-pack-year history and is currently averaging 1 pack/day.    The following portions of the patient's history were reviewed and updated as appropriate: allergies, current medications, past medical history, past social history, and problem list.    Review of Systems   Constitutional:  Positive for fatigue. Negative for chills and fever.   HENT:  Negative for congestion, ear pain, hearing loss, postnasal drip, rhinorrhea, sinus pressure, sneezing, sore throat and voice change.    Eyes:  Negative for visual disturbance.   Respiratory:  Positive for shortness of breath. Negative for cough and wheezing.    Cardiovascular:  Negative for chest pain, palpitations and leg swelling.   Gastrointestinal:  Negative for abdominal pain, blood in stool, constipation, diarrhea, nausea, vomiting and GERD.    Genitourinary:  Positive for nocturia (2-3). Negative for dysuria, frequency, hematuria and urgency.   Musculoskeletal:  Negative for arthralgias, back pain, joint swelling, myalgias and neck pain.   Skin:  Positive for wound. Negative for rash.   Neurological:  Negative for weakness and numbness.   Psychiatric/Behavioral:  Positive for sleep disturbance. Negative for suicidal ideas and depressed mood. The patient is not nervous/anxious.      Objective   Physical Exam  Constitutional:       General: He is not in acute distress.     Appearance: Normal appearance. He is well-developed. He is not ill-appearing or diaphoretic.      Comments: Appeared a bit older than stated age.  Alert and in fair spirits.  No apparent distress. No pallor, jaundice, diaphoresis, or cyanosis.     HENT:      Head: Atraumatic.      Right Ear: Tympanic membrane, ear canal and external ear normal.      Left Ear: Tympanic membrane, ear canal and external ear normal.      Mouth/Throat:      Lips: No lesions.      Mouth: Mucous membranes are moist. No oral lesions.      Dentition: Dental caries present.      Pharynx: No oropharyngeal exudate or posterior oropharyngeal erythema.   Eyes:      General: Lids are normal. Gaze aligned appropriately.      Extraocular Movements: Extraocular movements intact.      Conjunctiva/sclera: Conjunctivae normal.      Pupils: Pupils are equal.   Neck:      Thyroid: No thyroid mass or thyromegaly.      Vascular: No carotid bruit.      Trachea: Trachea normal. No tracheal deviation.   Cardiovascular:      Rate and Rhythm: Normal rate and regular rhythm.      Pulses:           Dorsalis pedis pulses are 2+ on the right side and 2+ on the left side.        Posterior tibial pulses are 2+ on the right side and 2+ on the left side.      Heart sounds: Normal heart sounds, S1 normal and S2 normal. No murmur heard.     No gallop.      Comments: No calf tenderness  Pulmonary:      Effort: Pulmonary effort is normal.       Breath sounds: Wheezing (diffuse - mild) present.      Comments: Pulmonary hyperinflation  Abdominal:      General: Bowel sounds are normal. There is no distension.   Musculoskeletal:      Right lower leg: No edema.      Left lower leg: No edema.   Lymphadenopathy:      Head:      Right side of head: No submental, submandibular, tonsillar, preauricular, posterior auricular or occipital adenopathy.      Left side of head: No submental, submandibular, tonsillar, preauricular, posterior auricular or occipital adenopathy.      Cervical: No cervical adenopathy.      Upper Body:      Right upper body: No supraclavicular adenopathy.      Left upper body: No supraclavicular adenopathy.   Skin:     General: Skin is warm and dry.      Coloration: Skin is not cyanotic, jaundiced or pale.      Findings: No rash.      Nails: There is no clubbing.      Comments: Dressing removed from left upper anterior chest.  ICD incision has healed well with no redness, swelling, or warmth.  Localized tenderness only   Neurological:      Mental Status: He is alert and oriented to person, place, and time.      Cranial Nerves: No dysarthria or facial asymmetry.      Sensory: No sensory deficit.      Motor: No tremor.      Coordination: Coordination normal.      Gait: Gait normal.   Psychiatric:         Attention and Perception: Attention normal.         Mood and Affect: Mood normal.         Speech: Speech normal.         Behavior: Behavior normal.         Thought Content: Thought content normal.       Assessment & Plan   Problems Addressed this Visit          Cardiac and Vasculature    Coronary artery disease involving native coronary artery of native heart without angina pectoris (Chronic)  Reminded regarding risk factor modification with an emphasis on tobacco cessation.  Continue dual antiplatelet therapy.  Follow up with cardiology     Essential hypertension (Chronic)   Hypertension: at goal. Evidence of target organ damage: coronary artery  disease and heart failure.  Encouraged to continue to work on diet and exercise plan.   Continue current medication    Relevant Orders    Comprehensive Metabolic Panel    HFrEF (heart failure with reduced ejection fraction) (Chronic)  With recent ICD placement  Instructions given regarding wound care going forward  Follow up with electrophysiology    Relevant Orders    Comprehensive Metabolic Panel    Mixed hyperlipidemia   As above.   Continue current medication.       Endocrine and Metabolic    Prediabetes  As above.   Continue current medication.       Health Encounters    Healthcare maintenance  Flu shot administered.  Will discuss Shingrix and an updated Tdap again at his return    Relevant Orders    Fluzone >6mos (Completed)       Hematology and Neoplasia    Iron deficiency anemia due to chronic blood loss  Ferrous sulfate increased to 325 twice daily  Reminded of the importance of GI endoscopy.  Patient is aware that his iron deficiency could be due to a potentially serious underlying problem including cancer  We will arrange a GI assessment in [place].  Patient will let us know if this is not set up within the next week or two  Updated labs will be drawn at one of his next healthcare visits in [place]    Relevant Medications    vitamin C (ASCORBIC ACID) 500 MG tablet    ferrous sulfate 325 (65 FE) MG tablet    Other Relevant Orders    Ambulatory Referral to Gastroenterology (Completed)    CBC & Differential    Comprehensive Metabolic Panel    Iron    Transferrin    Ferritin       Infectious Diseases    Encounter for immunization    Relevant Orders    Fluzone >6mos (Completed)       Pulmonary and Pneumonias    Mixed type COPD (chronic obstructive pulmonary disease)   COPD is stable.  Reminded of the importance of smoking cessation  Encouraged to remain as active as symptoms allow for       Tobacco    Smoker     Diagnoses         Codes Comments    Mixed hyperlipidemia    -  Primary ICD-10-CM:  E78.2  ICD-9-CM: 272.2     HFrEF (heart failure with reduced ejection fraction)     ICD-10-CM: I50.20  ICD-9-CM: 428.20     Essential hypertension     ICD-10-CM: I10  ICD-9-CM: 401.9     Coronary artery disease involving native coronary artery of native heart without angina pectoris     ICD-10-CM: I25.10  ICD-9-CM: 414.01     Prediabetes     ICD-10-CM: R73.03  ICD-9-CM: 790.29     Healthcare maintenance     ICD-10-CM: Z00.00  ICD-9-CM: V70.0     Iron deficiency anemia due to chronic blood loss     ICD-10-CM: D50.0  ICD-9-CM: 280.0     Mixed type COPD (chronic obstructive pulmonary disease)     ICD-10-CM: J44.9  ICD-9-CM: 496     Smoker     ICD-10-CM: F17.200  ICD-9-CM: 305.1     Encounter for immunization     ICD-10-CM: Z23  ICD-9-CM: V03.89

## 2024-10-23 VITALS
HEART RATE: 99 BPM | WEIGHT: 228 LBS | OXYGEN SATURATION: 98 % | HEIGHT: 71 IN | TEMPERATURE: 98.6 F | SYSTOLIC BLOOD PRESSURE: 118 MMHG | BODY MASS INDEX: 31.92 KG/M2 | RESPIRATION RATE: 15 BRPM | DIASTOLIC BLOOD PRESSURE: 58 MMHG

## 2024-10-23 RX ORDER — FERROUS SULFATE 325(65) MG
325 TABLET ORAL 2 TIMES DAILY
Qty: 60 TABLET | Refills: 2 | Status: SHIPPED | OUTPATIENT
Start: 2024-10-23

## 2024-10-23 RX ORDER — ASCORBIC ACID 500 MG
TABLET ORAL
Qty: 60 TABLET | Refills: 2 | Status: SHIPPED | OUTPATIENT
Start: 2024-10-23

## 2024-10-28 ENCOUNTER — TELEPHONE (OUTPATIENT)
Dept: FAMILY MEDICINE CLINIC | Facility: CLINIC | Age: 59
End: 2024-10-28
Payer: COMMERCIAL

## 2024-10-28 NOTE — TELEPHONE ENCOUNTER
"Pt is aware of this information.       ----- Message from Claudio Jane sent at 10/25/2024  3:42 PM EDT -----  Wow - tough plan  Please let him know that we will be unable to get him pantoprazole through his insurance until he has his scope in [place]  ----- Message -----  From: Teodora Resendez MA  Sent: 10/25/2024   1:08 PM EDT  To: Claudio Jane MD    Pa was denied.   \"Your PA request has been denied. Additional information will be provided in the denial communication. We reviewed the information we received with the request, but it did not allow us to approve the requested medication. The member's plan requires that a proton pump inhibitor (PPI) may be approved if one of the following prior authorization criteria are met:1. Grade III Erosive Esophagitis (damage that goes all the way around the esophagus and may cause heartburn and difficulty swallowing) confirmed by endoscopy (a nonsurgical procedure that can be used to examine your digestive tract)2. Grade IV Erosive Esophagitis (severe damage with presence of chronic complications such as deep ulcers, strictures, or Langford's metaplasia) confirmed by biopsy (a procedure to remove a piece of tissue or a sample of cells from the body so that it can be tested in a laboratory)3. Zollinger-Gutierrez syndrome (a rare condition where one or more tumors grow in the pancreas or upper part of the small intestine) confirmed by a diagnostic test (such as fasting serum gastrin, basal 1 hour acid output, secretion stimulation test\".  ----- Message -----  From: Claudio Jane MD  Sent: 10/23/2024  12:49 PM EDT  To: Teodora Resendez MA    He is apparently having to pay for his pantoprazole.  Please see if he requires a PA or if there is some other issue  "

## 2024-10-29 ENCOUNTER — OFFICE VISIT (OUTPATIENT)
Dept: CARDIOLOGY | Facility: CLINIC | Age: 59
End: 2024-10-29
Payer: COMMERCIAL

## 2024-10-29 VITALS
DIASTOLIC BLOOD PRESSURE: 69 MMHG | WEIGHT: 228.2 LBS | HEIGHT: 71 IN | OXYGEN SATURATION: 98 % | HEART RATE: 88 BPM | SYSTOLIC BLOOD PRESSURE: 109 MMHG | BODY MASS INDEX: 31.95 KG/M2

## 2024-10-29 DIAGNOSIS — I73.9 CLAUDICATION OF BOTH LOWER EXTREMITIES: ICD-10-CM

## 2024-10-29 DIAGNOSIS — E78.2 MIXED HYPERLIPIDEMIA: Primary | Chronic | ICD-10-CM

## 2024-10-29 DIAGNOSIS — I10 ESSENTIAL HYPERTENSION: Chronic | ICD-10-CM

## 2024-10-29 DIAGNOSIS — I50.20 HFREF (HEART FAILURE WITH REDUCED EJECTION FRACTION): Chronic | ICD-10-CM

## 2024-10-29 DIAGNOSIS — I25.10 CORONARY ARTERY DISEASE INVOLVING NATIVE CORONARY ARTERY OF NATIVE HEART WITHOUT ANGINA PECTORIS: Chronic | ICD-10-CM

## 2024-10-29 DIAGNOSIS — F17.200 SMOKER: ICD-10-CM

## 2024-10-29 RX ORDER — BUPROPION HYDROCHLORIDE 150 MG/1
TABLET ORAL
Qty: 57 TABLET | Refills: 0 | Status: SHIPPED | OUTPATIENT
Start: 2024-10-29

## 2024-10-29 RX ORDER — ROSUVASTATIN CALCIUM 20 MG/1
20 TABLET, COATED ORAL DAILY
Qty: 30 TABLET | Refills: 1 | Status: SHIPPED | OUTPATIENT
Start: 2024-10-29

## 2024-10-29 RX ORDER — NICOTINE 21 MG/24HR
1 PATCH, TRANSDERMAL 24 HOURS TRANSDERMAL EVERY 24 HOURS
Qty: 30 PATCH | Refills: 1 | Status: SHIPPED | OUTPATIENT
Start: 2024-10-29

## 2024-10-29 NOTE — PROGRESS NOTES
Chief Complaint  Follow-up (Denies CP, no cardiac symptoms.) and Med Management (Complains of leg cramps.)    Subjective          Dave Townsend presents to [facility] for follow up.    History of Present Illness  History of Present Illness  Makayla Townsend presents for follow-up of coronary artery disease, HFrEF, hypertension, and hyperlipidemia.  His last visit to clinic was 06/27/2024 with me.  He was referred to electrophysiology for implantation of AICD for primary prevention of sudden cardiac death.  That was implanted October 7, 2024.  He is recovering without complication.    He reports normal blood pressure readings at home, with systolic values consistently below 120. He is not experiencing any chest pain or increased shortness of breath since his last visit. His current medication regimen includes Brilinta twice daily in combination with aspirin. He has not previously taken any other cholesterol medications.     He experiences leg weakness and pain, which he is concerned may be related to one of his medications. The pain is severe enough to limit his mobility, requiring him to rest after short distances. This issue has persisted for approximately two months and affects both legs equally. He also reports leg cramps at night.  His cholesterol has gotten to goal thanks to compliance with atorvastatin therapy.    SOCIAL HISTORY  He is still smoking.     More than 3 minutes but less than 10 was spent in counseling regarding smoking cessation.  Discussed motivation for quitting, previous attempts, and the options for pharmacological support. He has made efforts to reduce his smoking habit, currently consuming approximately one pack per day. He has tried various smoking cessation aids including Chantix, nicotine patches, and nicotine gum, but found them either ineffective or intolerable. He expresses a strong desire to quit smoking and is open to trying new methods.  Quit date  "selected as November 6, 2024.  Tobacco Use: High Risk (11/9/2024)    Patient History     Smoking Tobacco Use: Every Day     Smokeless Tobacco Use: Former     Passive Exposure: Current       Objective     Vital Signs:   /69 (BP Location: Left arm, Patient Position: Sitting, Cuff Size: Adult)   Pulse 88   Ht 180.3 cm (71\")   Wt 104 kg (228 lb 3.2 oz)   SpO2 98%   BMI 31.83 kg/m²       Physical Exam  Vitals and nursing note reviewed.   Constitutional:       General: He is not in acute distress.     Appearance: He is obese.   HENT:      Head: Normocephalic and atraumatic.   Eyes:      Conjunctiva/sclera: Conjunctivae normal.   Neck:      Vascular: No carotid bruit.   Cardiovascular:      Rate and Rhythm: Normal rate and regular rhythm.      Pulses: Normal pulses.   Pulmonary:      Effort: Pulmonary effort is normal.      Breath sounds: Normal breath sounds.   Musculoskeletal:      Cervical back: Neck supple.      Right lower leg: No edema.      Left lower leg: No edema.   Skin:     General: Skin is warm and dry.   Neurological:      General: No focal deficit present.   Psychiatric:         Mood and Affect: Mood normal.         Behavior: Behavior normal.             Result Review :   The following data was reviewed by: MARITA Juarez on 10/29/2024:  Common labs          6/9/2024    10:24 8/12/2024    15:51 10/7/2024    12:33   Common Labs   Glucose 108  112  102    BUN 21  25  19    Creatinine 1.04  1.11  0.96    Sodium 136  138  138    Potassium 4.3  4.1  3.9    Chloride 105  108  107    Calcium 9.0  9.0  8.6    Albumin 4.0  4.1     Total Bilirubin 0.2  <0.2     Alkaline Phosphatase 74  68     AST (SGOT) 17  44     ALT (SGPT) 18  56     WBC 7.12  6.58  7.10    Hemoglobin 8.9  9.4  8.5    Hematocrit 28.6  30.6  30.8    Platelets 225  230  211    Total Cholesterol 103  102     Triglycerides 101  132     HDL Cholesterol 29  30     LDL Cholesterol  55  48     Hemoglobin A1C 5.10        Lipid Panel          " 3/27/2024    01:07 6/9/2024    10:24 8/12/2024    15:51   Lipid Panel   Total Cholesterol 184  103  102    Triglycerides 136  101  132    HDL Cholesterol 37  29  30    VLDL Cholesterol 25  19  24    LDL Cholesterol  122  55  48    LDL/HDL Ratio 3.24  1.86  1.52      Data reviewed : Cardiology studies as detailed below      Last Cardiac Cath  Results for orders placed during the hospital encounter of 03/26/24    Cardiac Catheterization/Vascular Study    Conclusion    Mid RCA-2 lesion is 60% stenosed.    Dist RCA lesion is 100% stenosed.    RPAV lesion is 95% stenosed.    1.  Cardiac.  Patient with acute coronary syndrome involving the right coronary artery with 100% stenosis prior to intervention.  PTCA and stent placement to the right coronary artery done.  Angioplasty and stent of the posterolateral branch done also      Last Stress test       Last Echo  Results for orders placed during the hospital encounter of 06/16/24    Adult Transthoracic Echo Limited W/ Cont if Necessary Per Protocol    Interpretation Summary    Left ventricular ejection fraction appears to be 26 - 30%.    The left ventricular cavity is borderline dilated.             Current Outpatient Medications   Medication Sig Dispense Refill    aspirin 81 MG EC tablet Take 1 tablet by mouth Daily. 30 tablet 1    dapagliflozin Propanediol 10 MG tablet Take 1 tablet by mouth Daily for 180 days. 90 tablet 1    ferrous sulfate 325 (65 FE) MG tablet Take 1 tablet by mouth 2 (Two) Times a Day. 60 tablet 2    metoprolol succinate XL (TOPROL-XL) 25 MG 24 hr tablet Take ½ tablet by mouth Daily. 90 tablet 0    nitroglycerin (NITROSTAT) 0.4 MG SL tablet Place 1 tablet under the tongue Every 5 minutes as Needed for Chest Pain (Systolic BP Greater Than 100). Take no more than 3 doses in 15 minutes. 25 tablet 0    sacubitril-valsartan (Entresto) 24-26 MG tablet Take 1 tablet by mouth 2 Times a Day for 90 days. 180 tablet 1    spironolactone (ALDACTONE) 25 MG tablet  Take ½ tablet by mouth Daily. 90 tablet 0    ticagrelor (BRILINTA) 90 MG tablet tablet Take 1 tablet by mouth 2 (Two) Times a Day. 180 each 3    Vericiguat (Verquvo) 10 MG tablet Take 1 tablet by mouth Daily. 90 tablet 3    vitamin C (ASCORBIC ACID) 500 MG tablet Take 1 tablet by mouth with each dose of ferrous sulfate 60 tablet 2    buPROPion XL (Wellbutrin XL) 150 MG 24 hr tablet Take 1 Tablet by Mouth daily for 3 Days. Then increase to 1 Tablet by Mouth Twice Daily. After 1 week of therapy, discontinue smoking. 57 tablet 0    nicotine (NICODERM CQ) 21 MG/24HR patch Place 1 patch on the skin as directed by provider Daily. 30 patch 1    pantoprazole (PROTONIX) 40 MG EC tablet Take 1 tablet by mouth Daily. (Patient not taking: Reported on 10/29/2024) 30 tablet 5    rosuvastatin (CRESTOR) 20 MG tablet Take 1 tablet by mouth Daily. 30 tablet 1     No current facility-administered medications for this visit.            Assessment and Plan    Problem List Items Addressed This Visit       Essential hypertension (Chronic)    Mixed hyperlipidemia - Primary (Chronic)    Overview     3/27/2024-total cholesterol 184, triglycerides 136, HDL 37,   06/09/2024-total cholesterol 103, triglycerides 101, HDL 29, LDL 55         Relevant Medications    rosuvastatin (CRESTOR) 20 MG tablet    Smoker    Relevant Medications    buPROPion XL (Wellbutrin XL) 150 MG 24 hr tablet    nicotine (NICODERM CQ) 21 MG/24HR patch    HFrEF (heart failure with reduced ejection fraction) (Chronic)    Overview     03/26/2024-LVEF 26 to 30%  06/16/2024-LVEF 26 to 30%  10/7/2024-AICD implanted           Coronary artery disease involving native coronary artery of native heart without angina pectoris (Chronic)    Overview     03/26/20249408-[id number]-SHL of MICHAEL to RCA and posterolateral         Relevant Medications    rosuvastatin (CRESTOR) 20 MG tablet     Other Visit Diagnoses       Claudication of both lower extremities        Relevant Orders    Doppler  Arterial Multi Level Lower Extremity - Bilateral CAR          Diagnoses and all orders for this visit:    1. Mixed hyperlipidemia (Primary)  -     rosuvastatin (CRESTOR) 20 MG tablet; Take 1 tablet by mouth Daily.  Dispense: 30 tablet; Refill: 1    2. Coronary artery disease involving native coronary artery of native heart without angina pectoris  -     rosuvastatin (CRESTOR) 20 MG tablet; Take 1 tablet by mouth Daily.  Dispense: 30 tablet; Refill: 1    3. Claudication of both lower extremities  -     Doppler Arterial Multi Level Lower Extremity - Bilateral CAR; Future    4. Smoker  -     buPROPion XL (Wellbutrin XL) 150 MG 24 hr tablet; Take 1 Tablet by Mouth daily for 3 Days. Then increase to 1 Tablet by Mouth Twice Daily. After 1 week of therapy, discontinue smoking.  Dispense: 57 tablet; Refill: 0  -     nicotine (NICODERM CQ) 21 MG/24HR patch; Place 1 patch on the skin as directed by provider Daily.  Dispense: 30 patch; Refill: 1    5. HFrEF (heart failure with reduced ejection fraction)    6. Essential hypertension      Assessment & Plan  1. Leg pain.  The leg pain could be a side effect of the Lipitor he is currently taking. However, the nature of the pain, which is exacerbated by walking, raises concerns about potential blockages in his legs. His potassium levels have been consistently normal, ruling out electrolyte imbalance as a cause. A noninvasive CANDI test will be conducted to measure the pressures in his legs. His cholesterol medication will be switched to rosuvastatin, with a 30-day supply provided.     2. Smoking cessation.  Wellbutrin will be initiated, followed by a switch to nicotine patches after a week. He has been advised to remove the patch if he decides to smoke. A follow-up appointment in a month will assess his progress. If successful, the dosage of the nicotine patch will be gradually reduced. He has been encouraged to find alternative activities to replace smoking.    3. Medication  Management.  Refills for his other medications will be sent to the pharmacy.    Follow-up  Return in 1 month for follow-up.         Follow Up     Return in about 4 weeks (around 11/26/2024).    Patient was given instructions and counseling regarding his condition or for health maintenance advice. Please see specific information pulled into the AVS if appropriate.       Electronically signed by MARITA Juarez, 11/09/24, 3:19 PM EST.    Patient or patient representative verbalized consent for the use of Ambient Listening during the visit with  MARITA Juarez for chart documentation. 11/9/2024  15:19 EST     Dictated Utilizing Dragon Dictation: Part of this note may be an electronic transcription/translation of spoken language to printed text using the Dragon Dictation System

## 2024-11-04 ENCOUNTER — SPECIALTY PHARMACY (OUTPATIENT)
Dept: PHARMACY | Facility: HOSPITAL | Age: 59
End: 2024-11-04
Payer: COMMERCIAL

## 2024-11-06 DIAGNOSIS — I73.9 PVD (PERIPHERAL VASCULAR DISEASE) WITH CLAUDICATION: Primary | ICD-10-CM

## 2024-11-14 ENCOUNTER — HOSPITAL ENCOUNTER (OUTPATIENT)
Dept: CARDIOLOGY | Facility: HOSPITAL | Age: 59
Discharge: HOME | End: 2024-11-14
Admitting: PHYSICIAN ASSISTANT
Payer: COMMERCIAL

## 2024-11-14 VITALS
SYSTOLIC BLOOD PRESSURE: 126 MMHG | WEIGHT: 228 LBS | HEART RATE: 90 BPM | DIASTOLIC BLOOD PRESSURE: 72 MMHG | BODY MASS INDEX: 31.8 KG/M2 | OXYGEN SATURATION: 97 %

## 2024-11-14 DIAGNOSIS — D50.0 IRON DEFICIENCY ANEMIA DUE TO CHRONIC BLOOD LOSS: ICD-10-CM

## 2024-11-14 DIAGNOSIS — E78.2 MIXED HYPERLIPIDEMIA: Chronic | ICD-10-CM

## 2024-11-14 DIAGNOSIS — I10 ESSENTIAL HYPERTENSION: Chronic | ICD-10-CM

## 2024-11-14 DIAGNOSIS — I50.20 HFREF (HEART FAILURE WITH REDUCED EJECTION FRACTION): Primary | Chronic | ICD-10-CM

## 2024-11-14 LAB — ABSOLUTE LUNG FLUID CONTENT: 33 % (ref 20–35)

## 2024-11-14 PROCEDURE — 94726 PLETHYSMOGRAPHY LUNG VOLUMES: CPT | Performed by: PHYSICIAN ASSISTANT

## 2024-11-14 NOTE — PROGRESS NOTES
Heart Failure Clinic    Date: 11/14/24     Vitals:    11/14/24 1324   BP: 126/72   Pulse: 90   SpO2: 97%    Weight: 228lbs    Method of arrival: Ambulatory    Weighing self daily: Most days    Monitoring Heart Failure Zones: Yes    Today's HF Zone: Green    Taking medications as prescribed: Yes    Edema No    Shortness of Air: No    Number of pillows used at night:<2    Educational Materials given:  AVS Given                                                                         ReDS Value: 33   25-35 Optimal Value Status      Yanna Shah RN 11/14/24 13:26 EST

## 2024-11-14 NOTE — PROGRESS NOTES
[facility]  MEG Godoy Paul Christen, MD  [address] [place] [address]    Thank you for asking me to see Dave Townsend for congestive heart failure.    HPI:     This is a 59 y.o. male with known past medical history of:    HFrEF  TTE 3/2024: LVEF 26-30%, normal diastolic function, mild MR  TTE 6/2024: LVEF 26-30%  10/7/24 Biotronik single chamber ICD placed  ASCVD status post STEMI in March 2024  LHC in March 2024 PCI drug-eluting stent to the RCA and 2 stents to the posterolateral branch  Essential hypertension  Hyperlipidemia    Dave Townsend presents for today for heart failure clinic evaluation.  The patient is typically seen by Claudio Jane MD.  Patient's primary cardiologist is Dr. Mendosa.     Last known EF 26-30%.   Last known hospitalization and/or ED visit: She was hospitalized from March 26 through March 28, 2024 with STEMI which time patient underwent left heart catheterization with PCI drug-eluting stent to the RCA and 2 stents to the posterolateral branch  Accompanied by: Self          11/14/2024 visit data/details regarding:   Dyspnea: Denies  Lower extremity swelling: Improved  Abdominal swelling: Denies  Home weight: Weight monitoring booklet provided during initial visit; Has scale.   Home BP: BP monitoring booklet provided during initial visit; Has BP cuff.   Home heart rate: HR monitoring booklet provided during initial visit.  Daily activities of living: Performing on his own  Pillows/lying flat: 3 pillows in bed   HF zone: Green  Patient is chest pain free and doing well.  He is tolerating medication regimen well in addition to tolerating his defibrillator placement.         Review of Systems - Review of Systems   Constitutional: Negative for decreased appetite and diaphoresis.   HENT:  Negative for congestion and ear pain.    Eyes:  Negative for blurred vision, double vision and pain.   Cardiovascular:  Negative  for chest pain, claudication and dyspnea on exertion.   Respiratory:  Positive for shortness of breath. Negative for cough.    Endocrine: Negative for cold intolerance and heat intolerance.   Hematologic/Lymphatic: Negative for adenopathy and bleeding problem.   Skin:  Negative for dry skin and nail changes.   Musculoskeletal:  Negative for arthritis and falls.   Gastrointestinal:  Negative for bloating and anorexia.   Genitourinary:  Negative for bladder incontinence and dysuria.   Neurological:  Negative for aphonia and difficulty with concentration.   Psychiatric/Behavioral:  Negative for altered mental status and hallucinations.          All other systems were reviewed and were negative.    Patient Active Problem List   Diagnosis    Essential hypertension    Mixed hyperlipidemia    Prediabetes    Smoker    Mixed type COPD (chronic obstructive pulmonary disease)    Healthcare maintenance    HFrEF (heart failure with reduced ejection fraction)    Coronary artery disease involving native coronary artery of native heart without angina pectoris    Iron deficiency anemia due to chronic blood loss    Encounter for immunization       family history includes Emphysema in his father; Heart disease in his mother.     reports that he has been smoking cigarettes. He has been exposed to tobacco smoke. He has quit using smokeless tobacco.  His smokeless tobacco use included snuff. He reports that he does not drink alcohol and does not use drugs.    Allergies   Allergen Reactions    Latex Rash         Current Outpatient Medications:     aspirin 81 MG EC tablet, Take 1 tablet by mouth Daily., Disp: 30 tablet, Rfl: 1    dapagliflozin Propanediol 10 MG tablet, Take 1 tablet by mouth Daily for 180 days., Disp: 90 tablet, Rfl: 1    ferrous sulfate 325 (65 FE) MG tablet, Take 1 tablet by mouth 2 (Two) Times a Day., Disp: 60 tablet, Rfl: 2    metoprolol succinate XL (TOPROL-XL) 25 MG 24 hr tablet, Take ½ tablet by mouth Daily., Disp:  90 tablet, Rfl: 0    nitroglycerin (NITROSTAT) 0.4 MG SL tablet, Place 1 tablet under the tongue Every 5 minutes as Needed for Chest Pain (Systolic BP Greater Than 100). Take no more than 3 doses in 15 minutes., Disp: 25 tablet, Rfl: 0    rosuvastatin (CRESTOR) 20 MG tablet, Take 1 tablet by mouth Daily., Disp: 30 tablet, Rfl: 1    sacubitril-valsartan (Entresto) 24-26 MG tablet, Take 1 tablet by mouth 2 Times a Day for 90 days., Disp: 180 tablet, Rfl: 1    spironolactone (ALDACTONE) 25 MG tablet, Take ½ tablet by mouth Daily., Disp: 90 tablet, Rfl: 0    ticagrelor (BRILINTA) 90 MG tablet tablet, Take 1 tablet by mouth 2 (Two) Times a Day., Disp: 180 each, Rfl: 3    Vericiguat (Verquvo) 10 MG tablet, Take 1 tablet by mouth Daily., Disp: 90 tablet, Rfl: 3    vitamin C (ASCORBIC ACID) 500 MG tablet, Take 1 tablet by mouth with each dose of ferrous sulfate, Disp: 60 tablet, Rfl: 2    buPROPion XL (Wellbutrin XL) 150 MG 24 hr tablet, Take 1 Tablet by Mouth daily for 3 Days. Then increase to 1 Tablet by Mouth Twice Daily. After 1 week of therapy, discontinue smoking. (Patient not taking: Reported on 11/14/2024), Disp: 57 tablet, Rfl: 0    nicotine (NICODERM CQ) 21 MG/24HR patch, Place 1 patch on the skin as directed by provider Daily. (Patient not taking: Reported on 11/14/2024), Disp: 30 patch, Rfl: 1    pantoprazole (PROTONIX) 40 MG EC tablet, Take 1 tablet by mouth Daily. (Patient not taking: Reported on 11/14/2024), Disp: 30 tablet, Rfl: 5      Physical Exam:  I have reviewed the patient's current vital signs as documented in the patient's EMR.   Vitals:    11/14/24 1324   BP: 126/72   Pulse: 90   SpO2: 97%         Body mass index is 31.8 kg/m².       11/14/24  1324   Weight: 103 kg (228 lb)          Physical Exam  Vitals and nursing note reviewed.   Constitutional:       General: He is awake.      Appearance: Normal appearance. He is well-developed and well-groomed.   HENT:      Head: Normocephalic and atraumatic.    Eyes:      General: Lids are normal.      Conjunctiva/sclera:      Right eye: Right conjunctiva is not injected.      Left eye: Left conjunctiva is not injected.   Cardiovascular:      Rate and Rhythm: Normal rate and regular rhythm.      Heart sounds:      No S3 or S4 sounds.   Pulmonary:      Effort: No tachypnea or bradypnea.      Breath sounds: No decreased breath sounds, wheezing, rhonchi or rales.   Abdominal:      General: Bowel sounds are normal.      Palpations: Abdomen is soft.      Tenderness: There is no abdominal tenderness.   Musculoskeletal:      Right lower leg: No edema.      Left lower leg: No edema.   Skin:     General: Skin is warm and dry.   Neurological:      Mental Status: He is alert and oriented to person, place, and time.   Psychiatric:         Attention and Perception: Attention normal.         Mood and Affect: Mood normal.         Behavior: Behavior is cooperative.          JVP: Volume/Pulsation: Normal.        DATA REVIEWED:     ---------------------------------------------------  TTE/ROSA:  Results for orders placed during the hospital encounter of 06/16/24    Adult Transthoracic Echo Limited W/ Cont if Necessary Per Protocol    Interpretation Summary    Left ventricular ejection fraction appears to be 26 - 30%.    The left ventricular cavity is borderline dilated.        LAST HEART CATH/IF AVAILABLE:     Results for orders placed during the hospital encounter of 03/26/24    Cardiac Catheterization/Vascular Study    Conclusion    Mid RCA-2 lesion is 60% stenosed.    Dist RCA lesion is 100% stenosed.    RPAV lesion is 95% stenosed.    1.  Cardiac.  Patient with acute coronary syndrome involving the right coronary artery with 100% stenosis prior to intervention.  PTCA and stent placement to the right coronary artery done.  Angioplasty and stent of the posterolateral branch done also      -----------------------------------------------------  CXR/Imaging:   Imaging Results (Most Recent)   "     None            I personally reviewed and interpreted the CXR.      -----------------------------------------------------  CT:   No radiology results for the last 30 days.  I personally reviewed the images of the CT scan.  My personal interpretation is below.      ----------------------------------------------------    --------------------------------------------------------------------------------------------------    Laboratory evaluations:    Lab Results   Component Value Date    GLUCOSE 102 (H) 10/07/2024    BUN 19 10/07/2024    CREATININE 0.96 10/07/2024    BCR 19.8 10/07/2024    K 3.9 10/07/2024    CO2 21.0 (L) 10/07/2024    CALCIUM 8.6 10/07/2024    ALBUMIN 4.1 08/12/2024    AST 44 (H) 08/12/2024    ALT 56 (H) 08/12/2024     Lab Results   Component Value Date    WBC 7.10 10/07/2024    HGB 8.5 (L) 10/07/2024    HCT 30.8 (L) 10/07/2024    MCV 76.2 (L) 10/07/2024     10/07/2024     Lab Results   Component Value Date    CHOL 102 08/12/2024    TRIG 132 08/12/2024    HDL 30 (L) 08/12/2024    LDL 48 08/12/2024     Lab Results   Component Value Date    TSH 6.010 (H) 06/09/2024     Lab Results   Component Value Date    HGBA1C 5.10 06/09/2024     Lab Results   Component Value Date    ALT 56 (H) 08/12/2024     Lab Results   Component Value Date    HGBA1C 5.10 06/09/2024    HGBA1C 5.70 (H) 03/27/2024     Lab Results   Component Value Date    CREATININE 0.96 10/07/2024     Lab Results   Component Value Date    IRON 61 08/12/2024    FERRITIN 19.80 (L) 08/12/2024     No results found for: \"INR\", \"PROTIME\"     Lab Results   Component Value Date    ABSOLUTELUNG 33 11/14/2024    ABSOLUTELUNG 32 08/09/2024    ABSOLUTELUNG 34 05/20/2024       PAH RISK ASSESSMENT:      1. HFrEF (heart failure with reduced ejection fraction)    2. Essential hypertension    3. Mixed hyperlipidemia    4. Iron deficiency anemia due to chronic blood loss              ORDERS PLACED TODAY:  Orders Placed This Encounter   Procedures    ReDs " Vest        Diagnoses and all orders for this visit:    1. HFrEF (heart failure with reduced ejection fraction) (Primary)  Overview:  03/26/2024-LVEF 26 to 30%  06/16/2024-LVEF 26 to 30%  10/7/2024-AICD implanted      Orders:  -     ReDs Vest    2. Essential hypertension    3. Mixed hyperlipidemia  Overview:  3/27/2024-total cholesterol 184, triglycerides 136, HDL 37,   06/09/2024-total cholesterol 103, triglycerides 101, HDL 29, LDL 55      4. Iron deficiency anemia due to chronic blood loss                 MEDS ORDERED TODAY:    No orders of the defined types were placed in this encounter.       ---------------------------------------------------------------------------------------------------------------------------          ASSESSMENT/PLAN:      Diagnosis Plan   1. HFrEF (heart failure with reduced ejection fraction)  ReDs Vest      2. Essential hypertension        3. Mixed hyperlipidemia        4. Iron deficiency anemia due to chronic blood loss                not acutely decompensated chronic moderate systolic and diastolic heart failure. CHF. Etiology: Ischemic/CAD (Hx of AMI, CAD, or Ischemic Cardiomyopathy).     NYHA stage Stage C: Structural heart disease is present AND symptoms have occurredFC-Class III: Marked limitation of physical activity. Comfortable at rest. Less than ordinary activity causes fatigue, palpitation, or dyspnea.     Today, Patient is approaching euvolemiaand with  Moderate perfusion. The patient's hemodynamics are currently acceptable. HR is: normal and is at goal. BP/MAP was reviewed and there isroom for medication up-titration.  Clinical trajectory was assessed and hasimproved.     CHF GOAL DIRECTED MEDICAL THERAPY FOR PATIENT ADDRESSED/ADJUSTED:     GDMT: HFrEF    Drug Class   Drug   Dose Last Dose Adjustment Notes   ACEi/ARB/ARNI Entresto 24-26mg BID     Beta Blocker Toprol XL  12.5mg qd     MRA Spironolactone 12.5mg qd     SGLT2i Farxiga 10mg  N/A   Secondaries if  applicable:  Verquevo        -CHF Specific BB:    Metoprolol Succinate  at dose of 12.5mg qd.   We discussed processes/benefits of HF clinic including nursing, pharmacist, and provider evaluation during each visit with ability for in office ReDS vest, labs, and ability to provide IV diuresis in the clinic with close outpatient monitoring.  Additionally, patient was educated about the availability of delivery of medications to patient's clinic room prior to leaving the building which assists with medication compliance and insures medications are in hands when changes are made (if patient opts for apothecary usage) with thorough guidance regarding changes and medication schedule provided.          -ACE/ARB/ARNi:   Current dose: Entresto 24-26mg BID   Continue current dose at present with BP titrating well.     -MRA:   Spironolactone 12.5 mg on board; BMP checked on 11/15/24 and creatinine & potassium are within acceptable limits.      -SGLT2 inhibitor therapy:   A BMP at initiation to verify GFR >30 was recommended, as was interval GFR surveillance.  The patient was advised to hold SGLT2I when PO intake is restricted due to a planned surgery, or due to an underlying illness.    Recommend continuing Farxiga (dapagliflozin) 10mg daily with quarterly assessment of GFR.  Pharmacy helped with assistance program for affordability.    Pt was advised SEs, some severe, including hypersensitivity and Oliver's; coupled with discussion regarding common side effects of UTIs and female genital mycotic infections were discussed. If you will be NPO, or are sick (poor PO intake, N&V) please hold the medication until you are back to a normal diet.     -Diuretic regimen:   ReDS Vest reading for. 11/15/24 is 33; ReDs Vest reading reviewed with patient.    Not requiring loop. Continue MRA at present as outlined.   BMP, Mag, & ProBNP reviewed with patient.      -Fluid restriction/Sodium restriction:   Requested 2000 ml  restriction  Patient has been asked to weigh daily and was provided with a printed diuretic strategy.  1,500 mg Na restriction was discussed.      Secondary pharmacological therapy in HFreF:   EF is less than 45% @ 26-30%.   Maximized on GDMT as tolerated thus far including Toprol XL, Entresto, Farxiga, & Spironolactone  Recent hospitalization or IV diuresis includes March 2024 hospitalization with HFrEF  Given HFrEF with optimally treated with primary therapies for HFrEF and vasodilator therapy, continue Verquevo 10mg qd.         -Acute vs. Chronic underlying conditions other than HF addressed during visit:     Identifiable barriers to Heart Failure Self-care:   Medical Barriers:  Multiple medical comorbidities  Social Barriers: Financial [place] of HF treatment          BMI is >= 30 and <35. (Class 1 Obesity). The following options were offered after discussion;: Heart Failure dietary measures              >45 minutes out of 60 minutes face to face spent counseling patient extensively on dietary Na+ intake, importance of activity, weight monitoring, compliance with medications in addition to importance of titration with goal directed medical therapy and follow up appointments.            This document has been electronically signed by Julee Cruz PA-C  November 15, 2024 10:55 EST      Dictated Utilizing Dragon Dictation: Part of this note may be an electronic transcription/translation of spoken language to printed text using the Dragon Dictation System.    Follow-up appointment and medication changes provided in hand delivered After Visit Summary as well as reviewed in the room.

## 2024-11-14 NOTE — PROGRESS NOTES
" Heart Failure Clinic  Pharmacist Note     Dave Townsend is a 59 y.o. male seen in the Heart Failure Clinic for HFrEF for most EF of 26-30% on 3/28/24.    Dave Townsend reports a poor understanding of medications, but reports adherence to his doses.     He reports that he checks his BP daily and it runs \"good\" for the most part. He reports not having any low BP or high BP. He denies any episodes of lightheadedness.     He denies any swelling or SOB and is currently only on 12.5mg of Spironolactone at this time.     He reports that he does not have a job at this time and cannot afford any medications that have a copay on them.       Medication Use:   Hx of med intolerances:  None related to HF  Retail Rx Management: [facility]- lives in [place]- can mail to his niece in [place]    Past Medical History:   Diagnosis Date    Abnormal ECG     Hypertension     STEMI (ST elevation myocardial infarction) 03/26/2024     ALLERGIES: Latex  Current Outpatient Medications   Medication Sig Dispense Refill    aspirin 81 MG EC tablet Take 1 tablet by mouth Daily. 30 tablet 1    dapagliflozin Propanediol 10 MG tablet Take 1 tablet by mouth Daily for 180 days. 90 tablet 1    ferrous sulfate 325 (65 FE) MG tablet Take 1 tablet by mouth 2 (Two) Times a Day. 60 tablet 2    metoprolol succinate XL (TOPROL-XL) 25 MG 24 hr tablet Take ½ tablet by mouth Daily. 90 tablet 0    nitroglycerin (NITROSTAT) 0.4 MG SL tablet Place 1 tablet under the tongue Every 5 minutes as Needed for Chest Pain (Systolic BP Greater Than 100). Take no more than 3 doses in 15 minutes. 25 tablet 0    rosuvastatin (CRESTOR) 20 MG tablet Take 1 tablet by mouth Daily. 30 tablet 1    sacubitril-valsartan (Entresto) 24-26 MG tablet Take 1 tablet by mouth 2 Times a Day for 90 days. 180 tablet 1    spironolactone (ALDACTONE) 25 MG tablet Take ½ tablet by mouth Daily. 90 tablet 0    ticagrelor (BRILINTA) 90 MG tablet tablet Take 1 tablet by mouth 2 (Two) Times a Day. 180 each 3    " Vericiguat (Verquvo) 10 MG tablet Take 1 tablet by mouth Daily. 90 tablet 3    vitamin C (ASCORBIC ACID) 500 MG tablet Take 1 tablet by mouth with each dose of ferrous sulfate 60 tablet 2    buPROPion XL (Wellbutrin XL) 150 MG 24 hr tablet Take 1 Tablet by Mouth daily for 3 Days. Then increase to 1 Tablet by Mouth Twice Daily. After 1 week of therapy, discontinue smoking. (Patient not taking: Reported on 11/14/2024) 57 tablet 0    nicotine (NICODERM CQ) 21 MG/24HR patch Place 1 patch on the skin as directed by provider Daily. (Patient not taking: Reported on 11/14/2024) 30 patch 1    pantoprazole (PROTONIX) 40 MG EC tablet Take 1 tablet by mouth Daily. (Patient not taking: Reported on 11/14/2024) 30 tablet 5     No current facility-administered medications for this encounter.       Vaccination History:   Pneumonia: Got sick with a past dose and is not interested in   Annual Influenza: Usually does not get- see during next flu season  Shingles: Not interested in    Objective  Vitals:    11/14/24 1324   BP: 126/72   BP Location: Right arm   Patient Position: Sitting   Cuff Size: Adult   Pulse: 90   SpO2: 97%   Weight: 103 kg (228 lb)     Wt Readings from Last 3 Encounters:   11/14/24 103 kg (228 lb)   10/29/24 104 kg (228 lb 3.2 oz)   10/22/24 103 kg (228 lb)         11/14/24  1324   Weight: 103 kg (228 lb)     Lab Results   Component Value Date    GLUCOSE 102 (H) 10/07/2024    BUN 19 10/07/2024    CREATININE 0.96 10/07/2024    BCR 19.8 10/07/2024    K 3.9 10/07/2024    CO2 21.0 (L) 10/07/2024    CALCIUM 8.6 10/07/2024    ALBUMIN 4.1 08/12/2024    AST 44 (H) 08/12/2024    ALT 56 (H) 08/12/2024     Lab Results   Component Value Date    WBC 7.10 10/07/2024    HGB 8.5 (L) 10/07/2024    HCT 30.8 (L) 10/07/2024    MCV 76.2 (L) 10/07/2024     10/07/2024     Lab Results   Component Value Date    TROPONINT 2,243 (C) 03/28/2024     Lab Results   Component Value Date    PROBNP 194.0 05/20/2024     Results for orders  "placed during the hospital encounter of 06/16/24    Adult Transthoracic Echo Limited W/ Cont if Necessary Per Protocol    Interpretation Summary    Left ventricular ejection fraction appears to be 26 - 30%.    The left ventricular cavity is borderline dilated.         GDMT    Drug Class   Drug   Dose Last Dose Adjustment Additional Titration   Notes   ACEi/ARB/ARNI Entresto 24/26mg 4/28/24 Katina     Beta Blocker Toprol 12.5mg ~ 3/2024     MRA Spironolactone 12.5mg 5/9/24 Katina     SGLT2i Farxiga 10mg 4/28/24  Katina      Verquvo 10mg 5/20/24         Drug Therapy Problems    1. Cost issues-all medications with a copay    2. Refills-spironolactone and rosuvastatin  3. Pantoprazole coverage    Recommendations:     Reached out to Torrie, pharmacy care coordinator, for available programs for this patient to help lower cost for his medications.  Asked Julee to send in refills for him. (Set up refill coordination)  Will submit PA for pantoprazole  From [facility], \"we aren't able to perform the review at this time. In researching your request, records indicate this request is already in review. When review has been completed, you will receive determination by fax\"        Patient was educated on heart failure medications and the importance of medication adherence. All questions were addressed and patient expressed understanding. Used teach-back method to assess understanding.     Thank you for allowing me to participate in the care of your patient,    Anneliese Dale, [facility]  11/14/24  14:21 EST   "

## 2024-11-18 NOTE — ADDENDUM NOTE
Encounter addended by: Anneliese Dale [facility] on: 11/18/2024 10:18 AM   Actions taken: Specialty Pharmacy task added, Order Reconciliation Section accessed, Specialty Pharmacy task modified, Clinical Note Signed

## 2024-11-25 ENCOUNTER — HOSPITAL ENCOUNTER (OUTPATIENT)
Dept: ULTRASOUND IMAGING | Facility: HOSPITAL | Age: 59
Discharge: HOME | End: 2024-11-25
Admitting: NURSE PRACTITIONER
Payer: COMMERCIAL

## 2024-11-25 DIAGNOSIS — I73.9 PVD (PERIPHERAL VASCULAR DISEASE) WITH CLAUDICATION: ICD-10-CM

## 2024-11-25 PROCEDURE — 93922 UPR/L XTREMITY ART 2 LEVELS: CPT

## 2024-11-25 PROCEDURE — 93922 UPR/L XTREMITY ART 2 LEVELS: CPT | Performed by: RADIOLOGY

## 2024-12-09 PROBLEM — M79.605 LEG PAIN, BILATERAL: Status: ACTIVE | Noted: 2024-12-09

## 2024-12-09 PROBLEM — M79.604 LEG PAIN, BILATERAL: Status: ACTIVE | Noted: 2024-12-09

## 2024-12-30 ENCOUNTER — LAB (OUTPATIENT)
Dept: LAB | Facility: HOSPITAL | Age: 59
End: 2024-12-30
Payer: COMMERCIAL

## 2024-12-30 ENCOUNTER — OFFICE VISIT (OUTPATIENT)
Dept: CARDIOLOGY | Facility: CLINIC | Age: 59
End: 2024-12-30
Payer: COMMERCIAL

## 2024-12-30 VITALS
BODY MASS INDEX: 31.84 KG/M2 | HEIGHT: 71 IN | WEIGHT: 227.4 LBS | OXYGEN SATURATION: 98 % | DIASTOLIC BLOOD PRESSURE: 82 MMHG | HEART RATE: 98 BPM | SYSTOLIC BLOOD PRESSURE: 125 MMHG

## 2024-12-30 DIAGNOSIS — I50.20 HFREF (HEART FAILURE WITH REDUCED EJECTION FRACTION): Chronic | ICD-10-CM

## 2024-12-30 DIAGNOSIS — I10 ESSENTIAL HYPERTENSION: Chronic | ICD-10-CM

## 2024-12-30 DIAGNOSIS — E78.2 MIXED HYPERLIPIDEMIA: Chronic | ICD-10-CM

## 2024-12-30 DIAGNOSIS — E78.2 MIXED HYPERLIPIDEMIA: Primary | Chronic | ICD-10-CM

## 2024-12-30 DIAGNOSIS — I25.10 CORONARY ARTERY DISEASE INVOLVING NATIVE CORONARY ARTERY OF NATIVE HEART WITHOUT ANGINA PECTORIS: Chronic | ICD-10-CM

## 2024-12-30 PROCEDURE — 80061 LIPID PANEL: CPT

## 2024-12-30 PROCEDURE — 36415 COLL VENOUS BLD VENIPUNCTURE: CPT

## 2024-12-30 PROCEDURE — 82728 ASSAY OF FERRITIN: CPT

## 2024-12-30 PROCEDURE — 83540 ASSAY OF IRON: CPT

## 2024-12-30 PROCEDURE — 84466 ASSAY OF TRANSFERRIN: CPT

## 2024-12-30 PROCEDURE — 80053 COMPREHEN METABOLIC PANEL: CPT

## 2024-12-30 PROCEDURE — 85027 COMPLETE CBC AUTOMATED: CPT

## 2024-12-30 NOTE — ASSESSMENT & PLAN NOTE
And is followed by the heart failure clinic.  He is doing well on beta-blocker, SGLT2 inhibitor, Arni, and MRA.  Verquvo has been added.  Iron studies will be ordered today

## 2024-12-30 NOTE — ASSESSMENT & PLAN NOTE
Hypertension is Controlled  Goals of Care:Medication compliance and tolerance, Systolic blood pressure <130, and Diastolic blood pressure  <80  Plan:  Continue current medications  Follow up:in 3 months

## 2024-12-30 NOTE — ASSESSMENT & PLAN NOTE
Dyslipidemia is Controlled  Goals of care: Medication compliance and tolerance, Control progression of disease, and Maintain LDL <55  Plan: Continue current medications  Follow up: in 3 months

## 2024-12-30 NOTE — ASSESSMENT & PLAN NOTE
Coronary Artery Disease: Coronary artery disease is stable.  Goals of Care:Medication tolerance and compliance, control progression of disease, and Lifestyle modification:  tobacco use cessation  Plan: Continue current treatment regimen. Stop smoking.  Cardiac status will be reassessed in 3 months.

## 2024-12-30 NOTE — PROGRESS NOTES
"Chief Complaint  Follow-up (Denies CP, Complains of some fatigue, right leg numbness.) and Med Management (Tolerating all current medications.)    Subjective          Dave Townsend presents to [facility] for follow up.    History of Present Illness    Mr. Townsend presents for follow-up of coronary artery disease, HFrEF, hypertension, and hyperlipidemia.  At his last visit, 10/29/2024, he was prescribed Wellbutrin and nicotine patches for smoking cessation.  ABIs were ordered to evaluate for peripheral arterial disease.  It did not reveal any signs of stenosis.  His atorvastatin was changed to rosuvastatin for a trial.  History of Present Illness  He reports a resolution of his leg cramps following the adjustment of his cholesterol medication. However, he has been experiencing numbness in both legs. An ultrasound of his legs was performed, yielding normal results with no evidence of blockages.    He has been unable to obtain bupropion due to insurance coverage issues. Despite this, he has managed to reduce his smoking from 2 packs to 1 pack per day.  Did not find any benefit from nicotine replacement therapy and so that we will be removed from his list.      SOCIAL HISTORY  The patient admits to smoking one pack a day, reduced from two packs a day.       Tobacco Use: High Risk (12/30/2024)    Patient History     Smoking Tobacco Use: Every Day     Smokeless Tobacco Use: Former     Passive Exposure: Current       Objective     Vital Signs:   /82 (BP Location: Left arm, Patient Position: Sitting, Cuff Size: Adult)   Pulse 98   Ht 180.3 cm (71\")   Wt 103 kg (227 lb 6.4 oz)   SpO2 98%   BMI 31.72 kg/m²       Physical Exam  Vitals and nursing note reviewed. Exam conducted with a chaperone present (Accompanied by his niece).   Constitutional:       General: He is not in acute distress.     Appearance: He is obese.      Comments: Smells of cigarette smoke   HENT:      Head: " Normocephalic and atraumatic.   Eyes:      Conjunctiva/sclera: Conjunctivae normal.   Neck:      Vascular: No carotid bruit.   Cardiovascular:      Rate and Rhythm: Normal rate and regular rhythm.      Pulses: Normal pulses.   Pulmonary:      Effort: Pulmonary effort is normal.      Breath sounds: Normal breath sounds.   Musculoskeletal:      Cervical back: Neck supple.      Right lower leg: No edema.      Left lower leg: No edema.   Skin:     General: Skin is warm and dry.   Neurological:      General: No focal deficit present.   Psychiatric:         Mood and Affect: Mood normal.         Behavior: Behavior normal.             Result Review :   The following data was reviewed by: MARITA Juarez on 12/30/2024:  Common labs          6/9/2024    10:24 8/12/2024    15:51 10/7/2024    12:33   Common Labs   Glucose 108  112  102    BUN 21  25  19    Creatinine 1.04  1.11  0.96    Sodium 136  138  138    Potassium 4.3  4.1  3.9    Chloride 105  108  107    Calcium 9.0  9.0  8.6    Albumin 4.0  4.1     Total Bilirubin 0.2  <0.2     Alkaline Phosphatase 74  68     AST (SGOT) 17  44     ALT (SGPT) 18  56     WBC 7.12  6.58  7.10    Hemoglobin 8.9  9.4  8.5    Hematocrit 28.6  30.6  30.8    Platelets 225  230  211    Total Cholesterol 103  102     Triglycerides 101  132     HDL Cholesterol 29  30     LDL Cholesterol  55  48     Hemoglobin A1C 5.10        Lipid Panel          3/27/2024    01:07 6/9/2024    10:24 8/12/2024    15:51   Lipid Panel   Total Cholesterol 184  103  102    Triglycerides 136  101  132    HDL Cholesterol 37  29  30    VLDL Cholesterol 25  19  24    LDL Cholesterol  122  55  48    LDL/HDL Ratio 3.24  1.86  1.52      Iron   Date Value Ref Range Status   08/12/2024 61 59 - 158 mcg/dL Final     Ferritin   Date Value Ref Range Status   08/12/2024 19.80 (L) 30.00 - 400.00 ng/mL Final       Data reviewed : Cardiology studies as detailed below      Last Cardiac Cath  Results for orders placed during the  hospital encounter of 03/26/24    Cardiac Catheterization/Vascular Study    Conclusion    Mid RCA-2 lesion is 60% stenosed.    Dist RCA lesion is 100% stenosed.    RPAV lesion is 95% stenosed.    1.  Cardiac.  Patient with acute coronary syndrome involving the right coronary artery with 100% stenosis prior to intervention.  PTCA and stent placement to the right coronary artery done.  Angioplasty and stent of the posterolateral branch done also      Last ABIs    US Ankle / Brachial Indices Extremity Limited 11/25/2024    Narrative  EXAM:  US Ankle-Brachial Index (CANDI), 1 or 2 Levels    EXAM DATE:  11/25/2024 1:52 PM    CLINICAL HISTORY:  PAD; I73.9-Peripheral vascular disease, unspecified    TECHNIQUE:  Limited bilateral and noninvasive physiological study of the upper or  lower extremity arteries.  Bidirectional, Doppler and pressure waveform  recording with analysis at 1-2 levels.    COMPARISON:  No relevant prior studies available.    FINDINGS:  Right CANDI:  Unremarkable as visualized.  Right ankle-brachial index  (CANDI) is 1.24 which is within normal limits.  Left CANDI:  Unremarkable as visualized.  Left ankle-brachial index  (CANDI) is 1.24 which is within normal limits.    Impression  Unremarkable ABIs. No evidence of peripheral arterial disease.    This report was finalized on 11/25/2024 3:21 PM by Dr. George Mishra MD.         Last Echo  Results for orders placed during the hospital encounter of 06/16/24    Adult Transthoracic Echo Limited W/ Cont if Necessary Per Protocol    Interpretation Summary    Left ventricular ejection fraction appears to be 26 - 30%.    The left ventricular cavity is borderline dilated.         ECG 12 Lead    Date/Time: 12/30/2024 2:50 PM  Performed by: Lashonda Sharif APRN    Authorized by: Lashonda Sharif APRN  Comparison: compared with previous ECG from 7/16/2024  Similar to previous ECG  Comparison to previous ECG: Sinus rhythm with left axis deviation and Q waves in lead II, lead  III, lead aVF, and leads V4 through V6  Rhythm: sinus rhythm  Ectopy: infrequent PVCs  Rate: normal  BPM: 88  Q waves: II, III, aVF, V4, V5 and V6    QRS axis: left    Clinical impression: abnormal EKG           Current Outpatient Medications   Medication Sig Dispense Refill    aspirin 81 MG EC tablet Take 1 tablet by mouth Daily. 30 tablet 1    dapagliflozin Propanediol 10 MG tablet Take 1 tablet by mouth Daily for 180 days. 90 tablet 1    ferrous sulfate 325 (65 FE) MG tablet Take 1 tablet by mouth 2 (Two) Times a Day. 60 tablet 2    metoprolol succinate XL (TOPROL-XL) 25 MG 24 hr tablet Take ½ tablet by mouth Daily. 90 tablet 0    nitroglycerin (NITROSTAT) 0.4 MG SL tablet Place 1 tablet under the tongue Every 5 minutes as Needed for Chest Pain (Systolic BP Greater Than 100). Take no more than 3 doses in 15 minutes. 25 tablet 0    rosuvastatin (CRESTOR) 20 MG tablet Take 1 tablet by mouth Daily. 30 tablet 1    sacubitril-valsartan (Entresto) 24-26 MG tablet Take 1 tablet by mouth 2 Times a Day for 90 days. 180 tablet 1    spironolactone (ALDACTONE) 25 MG tablet Take 0.5 tablet by mouth Daily. 90 tablet 0    ticagrelor (BRILINTA) 90 MG tablet tablet Take 1 tablet by mouth 2 (Two) Times a Day. 180 each 3    Vericiguat (Verquvo) 10 MG tablet Take 1 tablet by mouth Daily. 90 tablet 3    vitamin C (ASCORBIC ACID) 500 MG tablet Take 1 tablet by mouth with each dose of ferrous sulfate 60 tablet 2    pantoprazole (PROTONIX) 40 MG EC tablet Take 1 tablet by mouth Daily. (Patient not taking: Reported on 10/29/2024) 30 tablet 5     No current facility-administered medications for this visit.            Assessment and Plan    Problem List Items Addressed This Visit       Essential hypertension (Chronic)    Current Assessment & Plan     Hypertension is Controlled  Goals of Care:Medication compliance and tolerance, Systolic blood pressure <130, and Diastolic blood pressure  <80  Plan:  Continue current medications  Follow  up:in 3 months           Mixed hyperlipidemia - Primary (Chronic)    Overview     3/27/2024-total cholesterol 184, triglycerides 136, HDL 37,   06/09/2024-total cholesterol 103, triglycerides 101, HDL 29, LDL 55         Current Assessment & Plan      Dyslipidemia is Controlled  Goals of care: Medication compliance and tolerance, Control progression of disease, and Maintain LDL <55  Plan: Continue current medications  Follow up: in 3 months               Relevant Orders    Lipid Panel    HFrEF (heart failure with reduced ejection fraction) (Chronic)    Overview     03/26/2024-LVEF 26 to 30%  06/16/2024-LVEF 26 to 30%  10/7/2024-AICD implanted           Current Assessment & Plan     And is followed by the heart failure clinic.  He is doing well on beta-blocker, SGLT2 inhibitor, Arni, and MRA.  Verquvo has been added.  Iron studies will be ordered today         Relevant Orders    Ferritin    Iron Profile    Transferrin Saturation    Coronary artery disease involving native coronary artery of native heart without angina pectoris (Chronic)    Overview     03/26/[id number] of MICHAEL to RCA and posterolateral         Current Assessment & Plan     Coronary Artery Disease: Coronary artery disease is stable.  Goals of Care:Medication tolerance and compliance, control progression of disease, and Lifestyle modification:  tobacco use cessation  Plan: Continue current treatment regimen. Stop smoking.  Cardiac status will be reassessed in 3 months.           Relevant Orders    Comprehensive Metabolic Panel    CBC (No Diff)    ECG 12 Lead     Diagnoses and all orders for this visit:    1. Mixed hyperlipidemia (Primary)  Assessment & Plan:   Dyslipidemia is Controlled  Goals of care: Medication compliance and tolerance, Control progression of disease, and Maintain LDL <55  Plan: Continue current medications  Follow up: in 3 months          Orders:  -     Lipid Panel; Future    2. Coronary artery disease involving native  coronary artery of native heart without angina pectoris  Assessment & Plan:  Coronary Artery Disease: Coronary artery disease is stable.  Goals of Care:Medication tolerance and compliance, control progression of disease, and Lifestyle modification:  tobacco use cessation  Plan: Continue current treatment regimen. Stop smoking.  Cardiac status will be reassessed in 3 months.      Orders:  -     Comprehensive Metabolic Panel; Future  -     CBC (No Diff); Future  -     ECG 12 Lead    3. HFrEF (heart failure with reduced ejection fraction)  Assessment & Plan:  And is followed by the heart failure clinic.  He is doing well on beta-blocker, SGLT2 inhibitor, Arni, and MRA.  Verquvo has been added.  Iron studies will be ordered today    Orders:  -     Ferritin; Future  -     Iron Profile; Future  -     Transferrin Saturation; Future    4. Essential hypertension  Assessment & Plan:  Hypertension is Controlled  Goals of Care:Medication compliance and tolerance, Systolic blood pressure <130, and Diastolic blood pressure  <80  Plan:  Continue current medications  Follow up:in 3 months          Assessment & Plan  1. Cholesterol management.  The leg cramps have resolved after discontinuing atorvastatin, indicating it was an adverse effect rather than a blockage. The numbness in the legs is likely related to a back issue and not the cholesterol medication. A cholesterol panel will be ordered today to ensure the new medication is effectively managing cholesterol levels. The dosage may be adjusted based on the results.    2. Smoking cessation.  Despite reducing smoking from 2 packs to 1 pack per day, smoking remains a significant risk factor. Continued efforts to reduce smoking are encouraged, and support will be provided as needed.    3. Blood pressure management.  Blood pressure is currently well-controlled.    4. Iron deficiency.  An iron level test will be conducted today. If the iron levels remain low, intravenous iron  supplementation may be considered to improve exercise capacity and breathing.    Follow-up  The patient will follow up in the first week of April 2025.    PROCEDURE  Stents were placed approximately one year ago.         Follow Up     Return in about 3 months (around 3/30/2025).    Patient was given instructions and counseling regarding his condition or for health maintenance advice. Please see specific information pulled into the AVS if appropriate.       Electronically signed by MARITA Juarez, 12/30/24, 2:47 PM EST.    Patient or patient representative verbalized consent for the use of Ambient Listening during the visit with  MARITA Juarez for chart documentation. 12/30/2024  14:47 EST     Dictated Utilizing Dragon Dictation: Part of this note may be an electronic transcription/translation of spoken language to printed text using the Dragon Dictation System

## 2024-12-31 LAB
ALBUMIN SERPL-MCNC: 4.2 G/DL (ref 3.5–5.2)
ALBUMIN/GLOB SERPL: 1.2 G/DL
ALP SERPL-CCNC: 70 U/L (ref 39–117)
ALT SERPL W P-5'-P-CCNC: 22 U/L (ref 1–41)
ANION GAP SERPL CALCULATED.3IONS-SCNC: 12.7 MMOL/L (ref 5–15)
AST SERPL-CCNC: 21 U/L (ref 1–40)
BILIRUB SERPL-MCNC: 0.2 MG/DL (ref 0–1.2)
BUN SERPL-MCNC: 17 MG/DL (ref 6–20)
BUN/CREAT SERPL: 14.8 (ref 7–25)
CALCIUM SPEC-SCNC: 9.3 MG/DL (ref 8.6–10.5)
CHLORIDE SERPL-SCNC: 104 MMOL/L (ref 98–107)
CHOLEST SERPL-MCNC: 118 MG/DL (ref 0–200)
CO2 SERPL-SCNC: 20.3 MMOL/L (ref 22–29)
CREAT SERPL-MCNC: 1.15 MG/DL (ref 0.76–1.27)
DEPRECATED RDW RBC AUTO: 65.7 FL (ref 37–54)
EGFRCR SERPLBLD CKD-EPI 2021: 74.2 ML/MIN/1.73
ERYTHROCYTE [DISTWIDTH] IN BLOOD BY AUTOMATED COUNT: 23.6 % (ref 12.3–15.4)
FERRITIN SERPL-MCNC: 45.6 NG/ML (ref 30–400)
GLOBULIN UR ELPH-MCNC: 3.4 GM/DL
GLUCOSE SERPL-MCNC: 92 MG/DL (ref 65–99)
HCT VFR BLD AUTO: 38.7 % (ref 37.5–51)
HDLC SERPL-MCNC: 32 MG/DL (ref 40–60)
HGB BLD-MCNC: 12.1 G/DL (ref 13–17.7)
IRON 24H UR-MRATE: 32 MCG/DL (ref 59–158)
IRON SATN MFR SERPL: 7 % (ref 20–50)
LDLC SERPL CALC-MCNC: 67 MG/DL (ref 0–100)
LDLC/HDLC SERPL: 2.04 {RATIO}
MCH RBC QN AUTO: 25.3 PG (ref 26.6–33)
MCHC RBC AUTO-ENTMCNC: 31.3 G/DL (ref 31.5–35.7)
MCV RBC AUTO: 81 FL (ref 79–97)
PLATELET # BLD AUTO: 292 10*3/MM3 (ref 140–450)
PMV BLD AUTO: 10.9 FL (ref 6–12)
POTASSIUM SERPL-SCNC: 4.3 MMOL/L (ref 3.5–5.2)
PROT SERPL-MCNC: 7.6 G/DL (ref 6–8.5)
RBC # BLD AUTO: 4.78 10*6/MM3 (ref 4.14–5.8)
SODIUM SERPL-SCNC: 137 MMOL/L (ref 136–145)
TIBC SERPL-MCNC: 471 MCG/DL (ref 298–536)
TRANSFERRIN SERPL-MCNC: 316 MG/DL (ref 200–360)
TRIGL SERPL-MCNC: 103 MG/DL (ref 0–150)
VLDLC SERPL-MCNC: 19 MG/DL (ref 5–40)
WBC NRBC COR # BLD AUTO: 11.74 10*3/MM3 (ref 3.4–10.8)

## 2025-01-21 ENCOUNTER — OFFICE VISIT (OUTPATIENT)
Dept: FAMILY MEDICINE CLINIC | Facility: CLINIC | Age: 60
End: 2025-01-21
Payer: COMMERCIAL

## 2025-01-21 DIAGNOSIS — F17.200 SMOKER: ICD-10-CM

## 2025-01-21 DIAGNOSIS — D50.0 IRON DEFICIENCY ANEMIA DUE TO CHRONIC BLOOD LOSS: ICD-10-CM

## 2025-01-21 DIAGNOSIS — J44.9 MIXED TYPE COPD (CHRONIC OBSTRUCTIVE PULMONARY DISEASE): ICD-10-CM

## 2025-01-21 DIAGNOSIS — E78.2 MIXED HYPERLIPIDEMIA: Primary | Chronic | ICD-10-CM

## 2025-01-21 DIAGNOSIS — I25.10 CORONARY ARTERY DISEASE INVOLVING NATIVE CORONARY ARTERY OF NATIVE HEART WITHOUT ANGINA PECTORIS: Chronic | ICD-10-CM

## 2025-01-21 DIAGNOSIS — I50.20 HFREF (HEART FAILURE WITH REDUCED EJECTION FRACTION): Chronic | ICD-10-CM

## 2025-01-21 DIAGNOSIS — Z00.00 HEALTHCARE MAINTENANCE: ICD-10-CM

## 2025-01-21 DIAGNOSIS — I10 ESSENTIAL HYPERTENSION: Chronic | ICD-10-CM

## 2025-01-21 DIAGNOSIS — R73.03 PREDIABETES: ICD-10-CM

## 2025-01-21 PROCEDURE — 99214 OFFICE O/P EST MOD 30 MIN: CPT | Performed by: GENERAL PRACTICE

## 2025-01-21 NOTE — PROGRESS NOTES
Subjective   Dave Townsend is a 59 y.o. male.     Chief Complaint  He returns for a scheduled reassessment of multiple medical problems including coronary artery disease, HFrEF, essential hypertension, hyperlipidemia, prediabetes, and iron deficiency anemia    History of Present Illness     Coronary Artery Disease  He underwent stenting x 2 of the RCA by Dr. Mendosa on 3/26/2024 after presenting with an inferior STEMI.  His coronary angiogram was otherwise unremarkable. He gives a history of previous MI treated in [place]. He remains short of breath with above average exertion, but continues to deny any chest pain, palpitations, lightheadedness, orthopnea, PND, calf pain, or swelling of the ankles.  He remains on dual antiplatelet therapy and is scheduled to undergo a cardiology reassessment with MARITA Latham on 4/1/2025.  He continues to smoke 1-2 packs/day    HFrEF  An echocardiogram done at the time of his STEMI revealed mild to moderate left ventricular dilation with an estimated EF of 30%. He underwent an updated echocardiogram on 6/16/2024 with no significant changes. He underwent placement of an ICD by Dr. Esteban on 10/7/2024.  He admits to continued tenderness about the site.  He remains on metoprolol succinate, sacubitril-valsartan, spironolactone, dapagliflozin, and vericiguat.      Essential Hypertension  He gives a long history of hypertension.  He is currently on metoprolol succinate, valsartan, and spironolactone  Lab Results   Component Value Date    GLUCOSE 92 12/30/2024    BUN 17 12/30/2024    CREATININE 1.15 12/30/2024     12/30/2024    K 4.3 12/30/2024     12/30/2024    CALCIUM 9.3 12/30/2024    PROTEINTOT 7.6 12/30/2024    ALBUMIN 4.2 12/30/2024    ALT 22 12/30/2024    AST 21 12/30/2024    ALKPHOS 70 12/30/2024    BILITOT 0.2 12/30/2024    GLOB 3.4 12/30/2024    AGRATIO 1.2 12/30/2024    BCR 14.8 12/30/2024    ANIONGAP 12.7 12/30/2024    EGFR 74.2 12/30/2024      Hyperlipidemia  He was changed from atorvastatin to rosuvastatin since last here due to lower extremity muscle aches with a prompt improvement  Lab Results   Component Value Date    CHOL 118 12/30/2024    TRIG 103 12/30/2024    HDL 32 (L) 12/30/2024    LDL 67 12/30/2024     Prediabetes  His A1c was elevated in hospital.  He states his glucose has never been high in the past.  He is currently on dapagliflozin for HFrEF    Iron Deficiency Anemia  He denies any bleeding whatsoever.  He remains on ferrous sulfate 325 twice daily.  He is scheduled to undergo a GI assessment with Milly KIRKLAND on 2/10/2025  Lab Results   Component Value Date    WBC 11.74 (H) 12/30/2024    HGB 12.1 (L) 12/30/2024    HCT 38.7 12/30/2024    MCV 81.0 12/30/2024     12/30/2024     Lab Results   Component Value Date    IRON 32 (L) 12/30/2024    LABIRON 7 (L) 12/30/2024    TRANSFERRIN 316 12/30/2024    TIBC 471 12/30/2024    FERRITIN 45.60 12/30/2024     Smoker  He gives a 50-pack-year history and is currently averaging 2 pack/day.    The following portions of the patient's history were reviewed and updated as appropriate: allergies, current medications, past medical history, past social history, and problem list.    Review of Systems   Constitutional:  Positive for fatigue. Negative for chills and fever.   HENT:  Negative for congestion, ear pain, hearing loss, postnasal drip, rhinorrhea, sinus pressure, sneezing, sore throat and voice change.    Eyes:  Negative for visual disturbance.   Respiratory:  Positive for shortness of breath. Negative for cough and wheezing.    Cardiovascular:  Negative for chest pain, palpitations and leg swelling.   Gastrointestinal:  Negative for abdominal pain, blood in stool, constipation, diarrhea, nausea, vomiting and GERD.   Genitourinary:  Positive for nocturia (2-3). Negative for dysuria, frequency, hematuria and urgency.   Musculoskeletal:  Negative for arthralgias, back pain, joint  swelling, myalgias and neck pain.   Skin:  Negative for rash.   Neurological:  Negative for weakness and numbness.   Psychiatric/Behavioral:  Positive for sleep disturbance. Negative for suicidal ideas and depressed mood. The patient is not nervous/anxious.      Objective   Physical Exam  Constitutional:       General: He is not in acute distress.     Appearance: Normal appearance. He is well-developed. He is not ill-appearing or diaphoretic.      Comments: Accompanied by 2 of his grandchildren.  Appeared a bit older than stated age.  Bright and in good spirits.  No apparent distress. No pallor, jaundice, diaphoresis, or cyanosis.     HENT:      Head: Atraumatic.      Right Ear: Tympanic membrane, ear canal and external ear normal.      Left Ear: Tympanic membrane, ear canal and external ear normal.      Mouth/Throat:      Lips: No lesions.      Mouth: Mucous membranes are moist. No oral lesions.      Dentition: Dental caries present.      Pharynx: No oropharyngeal exudate or posterior oropharyngeal erythema.   Eyes:      General: Lids are normal. Gaze aligned appropriately.      Extraocular Movements: Extraocular movements intact.      Conjunctiva/sclera: Conjunctivae normal.      Pupils: Pupils are equal.   Neck:      Thyroid: No thyroid mass or thyromegaly.      Vascular: No carotid bruit.      Trachea: Trachea normal. No tracheal deviation.   Cardiovascular:      Rate and Rhythm: Normal rate and regular rhythm.      Pulses:           Dorsalis pedis pulses are 2+ on the right side and 2+ on the left side.        Posterior tibial pulses are 2+ on the right side and 2+ on the left side.      Heart sounds: Normal heart sounds, S1 normal and S2 normal. No murmur heard.     No gallop.      Comments: No calf tenderness  Pulmonary:      Effort: Pulmonary effort is normal.      Breath sounds: Wheezing (diffuse - mild) present.      Comments: Pulmonary hyperinflation  Abdominal:      General: Bowel sounds are normal.  There is no distension.   Musculoskeletal:      Right lower leg: No edema.      Left lower leg: No edema.   Lymphadenopathy:      Head:      Right side of head: No submental, submandibular, tonsillar, preauricular, posterior auricular or occipital adenopathy.      Left side of head: No submental, submandibular, tonsillar, preauricular, posterior auricular or occipital adenopathy.      Cervical: No cervical adenopathy.      Upper Body:      Right upper body: No supraclavicular adenopathy.      Left upper body: No supraclavicular adenopathy.   Skin:     General: Skin is warm and dry.      Coloration: Skin is not cyanotic, jaundiced or pale.      Findings: No rash.      Nails: There is no clubbing.   Neurological:      Mental Status: He is alert and oriented to person, place, and time.      Cranial Nerves: No dysarthria or facial asymmetry.      Sensory: No sensory deficit.      Motor: No tremor.      Coordination: Coordination normal.      Gait: Gait normal.   Psychiatric:         Attention and Perception: Attention normal.         Mood and Affect: Mood normal.         Speech: Speech normal.         Behavior: Behavior normal.         Thought Content: Thought content normal.       Assessment & Plan   Problems Addressed this Visit          Cardiac and Vasculature    Coronary artery disease involving native coronary artery of native heart without angina pectoris (Chronic)  Reminded regarding risk factor modification with an emphasis on tobacco cessation.  Continue dual antiplatelet therapy.  Follow up with cardiology     Essential hypertension (Chronic)   Hypertension: at goal. Evidence of target organ damage: coronary artery disease and heart failure.  Encouraged to continue to work on diet and exercise plan.   Continue current medication  Follow up with cardiology     HFrEF (heart failure with reduced ejection fraction) (Chronic)  Congestive heart failure due to coronary artery disease (CAD) and systolic  dysfunction.  Heart failure is stable.  Reminded regarding the importance of lifestyle modification.  Continue current medication  Follow up with cardiology     Mixed hyperlipidemia   As above.   Continue current medication.  Follow up with cardiology        Endocrine and Metabolic    Prediabetes  As above.  Continue current medication  Will continue to monitor       Health Encounters    Healthcare maintenance  Recommended COVID-19, Shingrix, and an updated Tdap  Reviewed the potential benefits of lung cancer screening.  Patient like to proceed and a LDCT of the chest will be arranged    Relevant Orders     CT Chest Low Dose Cancer Screening WO       Hematology and Neoplasia    Iron deficiency anemia due to chronic blood loss  Continue supplementation with monitoring.  Strongly encouraged to follow-up with GI       Pulmonary and Pneumonias    Mixed type COPD (chronic obstructive pulmonary disease)   COPD is stable.  Reminded of the importance of smoking cessation  Encouraged to remain as active as symptoms allow for       Tobacco    Smoker    Relevant Orders     CT Chest Low Dose Cancer Screening WO     Diagnoses         Codes Comments    Mixed hyperlipidemia    -  Primary ICD-10-CM: E78.2  ICD-9-CM: 272.2     HFrEF (heart failure with reduced ejection fraction)     ICD-10-CM: I50.20  ICD-9-CM: 428.20     Essential hypertension     ICD-10-CM: I10  ICD-9-CM: 401.9     Coronary artery disease involving native coronary artery of native heart without angina pectoris     ICD-10-CM: I25.10  ICD-9-CM: 414.01     Prediabetes     ICD-10-CM: R73.03  ICD-9-CM: 790.29     Healthcare maintenance     ICD-10-CM: Z00.00  ICD-9-CM: V70.0     Iron deficiency anemia due to chronic blood loss     ICD-10-CM: D50.0  ICD-9-CM: 280.0     Mixed type COPD (chronic obstructive pulmonary disease)     ICD-10-CM: J44.9  ICD-9-CM: 496     Smoker     ICD-10-CM: F17.200  ICD-9-CM: 305.1

## 2025-01-22 VITALS
RESPIRATION RATE: 15 BRPM | BODY MASS INDEX: 32.48 KG/M2 | SYSTOLIC BLOOD PRESSURE: 122 MMHG | TEMPERATURE: 97.2 F | DIASTOLIC BLOOD PRESSURE: 64 MMHG | HEART RATE: 96 BPM | HEIGHT: 71 IN | WEIGHT: 232 LBS | OXYGEN SATURATION: 98 %

## 2025-01-22 PROBLEM — M79.605 LEG PAIN, BILATERAL: Status: RESOLVED | Noted: 2024-12-09 | Resolved: 2025-01-22

## 2025-01-22 PROBLEM — M79.604 LEG PAIN, BILATERAL: Status: RESOLVED | Noted: 2024-12-09 | Resolved: 2025-01-22

## 2025-01-27 DIAGNOSIS — I50.20 HFREF (HEART FAILURE WITH REDUCED EJECTION FRACTION): Chronic | ICD-10-CM

## 2025-01-27 RX ORDER — DAPAGLIFLOZIN 10 MG/1
10 TABLET, FILM COATED ORAL DAILY
Qty: 90 TABLET | Refills: 1 | Status: SHIPPED | OUTPATIENT
Start: 2025-01-27 | End: 2025-07-26

## 2025-01-27 RX ORDER — SACUBITRIL AND VALSARTAN 24; 26 MG/1; MG/1
1 TABLET, FILM COATED ORAL 2 TIMES DAILY
Qty: 180 TABLET | Refills: 1 | Status: SHIPPED | OUTPATIENT
Start: 2025-01-27 | End: 2025-07-26

## 2025-01-28 ENCOUNTER — SPECIALTY PHARMACY (OUTPATIENT)
Dept: PHARMACY | Facility: HOSPITAL | Age: 60
End: 2025-01-28
Payer: COMMERCIAL

## 2025-02-20 ENCOUNTER — TELEPHONE (OUTPATIENT)
Dept: CARDIOLOGY | Facility: CLINIC | Age: 60
End: 2025-02-20
Payer: COMMERCIAL

## 2025-02-20 DIAGNOSIS — I50.20 HFREF (HEART FAILURE WITH REDUCED EJECTION FRACTION): Primary | Chronic | ICD-10-CM

## 2025-02-20 NOTE — TELEPHONE ENCOUNTER
Spoke to patient regarding lab results per Cinthia KIRKLAND. Patient voiced understanding and is agreeable to have iron infusions.

## 2025-02-20 NOTE — TELEPHONE ENCOUNTER
----- Message from Lanette SIMMONS sent at 2/18/2025 12:54 PM EST -----  Voicemail box is full, not accepting messages.

## 2025-02-28 ENCOUNTER — TELEPHONE (OUTPATIENT)
Dept: CARDIOLOGY | Facility: CLINIC | Age: 60
End: 2025-02-28

## 2025-02-28 RX ORDER — SPIRONOLACTONE 25 MG/1
12.5 TABLET ORAL DAILY
Qty: 45 TABLET | Refills: 1 | Status: SHIPPED | OUTPATIENT
Start: 2025-02-28

## 2025-02-28 NOTE — TELEPHONE ENCOUNTER
Patient cannot afford Brilinta, would like to discuss changing medication. Updated his appt. For 3/3/2025 to discuss options.

## 2025-02-28 NOTE — TELEPHONE ENCOUNTER
Caller Name: Dave Townsend      Relationship: Self      Best Contact Number: [phone]      Patient is requesting samples of BRILINTA      How many days of medication do you have left? 10 DAYS        Additional Information: PATIENTS MEDICATION IS UNAFFORDABLE AND WOULD LIKE SAMPLES, AND HELP WITH AN ASSISTANCE PROGRAM FOR IT. PLEASE ADVISE.

## 2025-03-03 ENCOUNTER — LAB (OUTPATIENT)
Dept: LAB | Facility: HOSPITAL | Age: 60
End: 2025-03-03
Payer: COMMERCIAL

## 2025-03-03 ENCOUNTER — OFFICE VISIT (OUTPATIENT)
Dept: CARDIOLOGY | Facility: CLINIC | Age: 60
End: 2025-03-03
Payer: COMMERCIAL

## 2025-03-03 ENCOUNTER — TELEPHONE (OUTPATIENT)
Dept: CARDIOLOGY | Facility: CLINIC | Age: 60
End: 2025-03-03

## 2025-03-03 VITALS
HEIGHT: 71 IN | HEART RATE: 85 BPM | BODY MASS INDEX: 32.17 KG/M2 | OXYGEN SATURATION: 96 % | WEIGHT: 229.8 LBS | DIASTOLIC BLOOD PRESSURE: 84 MMHG | SYSTOLIC BLOOD PRESSURE: 154 MMHG

## 2025-03-03 DIAGNOSIS — D50.9 IRON DEFICIENCY ANEMIA, UNSPECIFIED IRON DEFICIENCY ANEMIA TYPE: Chronic | ICD-10-CM

## 2025-03-03 DIAGNOSIS — I50.20 HFREF (HEART FAILURE WITH REDUCED EJECTION FRACTION): Chronic | ICD-10-CM

## 2025-03-03 DIAGNOSIS — E78.2 MIXED HYPERLIPIDEMIA: Chronic | ICD-10-CM

## 2025-03-03 DIAGNOSIS — E78.41 ELEVATED LP(A): ICD-10-CM

## 2025-03-03 DIAGNOSIS — I25.10 CORONARY ARTERY DISEASE INVOLVING NATIVE CORONARY ARTERY OF NATIVE HEART WITHOUT ANGINA PECTORIS: Primary | Chronic | ICD-10-CM

## 2025-03-03 DIAGNOSIS — I10 ESSENTIAL HYPERTENSION: Chronic | ICD-10-CM

## 2025-03-03 DIAGNOSIS — I25.10 CORONARY ARTERY DISEASE INVOLVING NATIVE CORONARY ARTERY OF NATIVE HEART WITHOUT ANGINA PECTORIS: Chronic | ICD-10-CM

## 2025-03-03 PROCEDURE — 83540 ASSAY OF IRON: CPT

## 2025-03-03 PROCEDURE — 84466 ASSAY OF TRANSFERRIN: CPT

## 2025-03-03 PROCEDURE — 82728 ASSAY OF FERRITIN: CPT

## 2025-03-03 PROCEDURE — 80053 COMPREHEN METABOLIC PANEL: CPT

## 2025-03-03 PROCEDURE — 83695 ASSAY OF LIPOPROTEIN(A): CPT

## 2025-03-03 PROCEDURE — 80061 LIPID PANEL: CPT

## 2025-03-03 PROCEDURE — 36415 COLL VENOUS BLD VENIPUNCTURE: CPT

## 2025-03-03 PROCEDURE — 85027 COMPLETE CBC AUTOMATED: CPT

## 2025-03-03 RX ORDER — CLOPIDOGREL BISULFATE 75 MG/1
TABLET ORAL
Qty: 98 TABLET | Refills: 3 | Status: SHIPPED | OUTPATIENT
Start: 2025-03-03

## 2025-03-03 RX ORDER — SACUBITRIL AND VALSARTAN 49; 51 MG/1; MG/1
1 TABLET, FILM COATED ORAL 2 TIMES DAILY
Qty: 180 TABLET | Refills: 3 | Status: SHIPPED | OUTPATIENT
Start: 2025-03-03

## 2025-03-03 RX ORDER — CLOPIDOGREL BISULFATE 75 MG/1
TABLET ORAL
Qty: 98 TABLET | Refills: 3 | Status: SHIPPED | OUTPATIENT
Start: 2025-03-03 | End: 2025-03-03 | Stop reason: SDUPTHER

## 2025-03-03 NOTE — TELEPHONE ENCOUNTER
Caller: Dave Townsend    Relationship: Self    Best call back number: [phone]    What is the best time to reach you: ANY    Who are you requesting to speak with (clinical staff, provider,  specific staff member): CLINICAL    What was the call regarding: PATIENT CALLED BECAUSE HE WAS PRESCRIBED PLAVIX AT HIS APPOINTMENT TODAY, AND HE WENT TO PICK IT UP AND FOUND OUT IT WILL COST HIM $40 FOR 90 DAYS SUPPLY, BUT HE IS UNABLE TO DO THAT AS HE HAS NO INCOME RIGHT NOW. PLEASE CALL PATIENT BACK WITH A DIFFERENT MEDICATION THAT IS CHEAPER. THANK YOU    Is it okay if the provider responds through [employer]: PLEASE CALL

## 2025-03-03 NOTE — TELEPHONE ENCOUNTER
I called the patient to discuss his concerns of a $40 co-pay for 90-day supply of Plavix through our pharmacy.  I informed him of the Browsy shane which lists price at [facility] for $11 for 90-day supply.  He agrees to download the shane and asks that I send the prescription to the [facility] in [place].  I have done that.  I counseled in detail that he must not miss any doses of dual antiplatelet therapy as he is at risk of in-stent stenosis, particularly in light of his continued smoking habit.  He verbalizes understanding and agrees to notify me if he has any further challenges to obtaining a second antiplatelet agent.  Electronically signed by MARITA Juarez, 03/03/25, 4:51 PM EST.

## 2025-03-03 NOTE — PROGRESS NOTES
"Chief Complaint  Follow-up (Denies CP, no cardiac symptoms.) and Med Management (Tolerating all current medications.)    Subjective          Dave Townsend presents to [facility] for follow up.    History of Present Illness  Mr. Townsend presents for follow-up of coronary artery disease, HFrEF, hypertension, and hyperlipidemia.  His last visit to clinic was 12/30/2024 with me.  At that visit labs and iron studies were ordered.    He has learned that Brilinta is unaffordable and so we will need to transition to an alternate P2 Y12 inhibitor    History of Present Illness  The patient presents for evaluation of hypertension, iron deficiency, hypercholesterolemia, and smoking cessation.    He has been monitoring his blood pressure at home, with systolic readings typically ranging from 140 to 150. He is not experiencing any lower extremity edema. His current regimen includes Entresto, administered twice daily.    He is currently on rosuvastatin 20 mg for cholesterol management.    He is agreeable to undergoing blood work today to confirm the need for iron infusions in the setting of heart failure.    He continues to smoke approximately one pack per day. He has previously attempted smoking cessation with Chantix, but his insurance does not cover Wellbutrin. Nicotine patches were also tried but were ineffective.  Strongly encouraged cessation           Tobacco Use: High Risk (3/3/2025)    Patient History     Smoking Tobacco Use: Every Day     Smokeless Tobacco Use: Former     Passive Exposure: Current       Objective     Vital Signs:   /84 (BP Location: Left arm, Patient Position: Sitting, Cuff Size: Adult)   Pulse 85   Ht 180.3 cm (71\")   Wt 104 kg (229 lb 12.8 oz)   SpO2 96%   BMI 32.05 kg/m²       Physical Exam  Vitals and nursing note reviewed. Exam conducted with a chaperone present (Accompanied by his niece).   Constitutional:       General: He is not in acute distress.     " Appearance: He is obese.      Comments: Smells of cigarette smoke   HENT:      Head: Normocephalic and atraumatic.   Eyes:      Conjunctiva/sclera: Conjunctivae normal.   Neck:      Vascular: No carotid bruit.   Cardiovascular:      Rate and Rhythm: Normal rate and regular rhythm.      Pulses: Normal pulses.   Pulmonary:      Effort: Pulmonary effort is normal.      Breath sounds: Normal breath sounds.   Musculoskeletal:      Cervical back: Neck supple.      Right lower leg: No edema.      Left lower leg: No edema.   Skin:     General: Skin is warm and dry.   Neurological:      General: No focal deficit present.   Psychiatric:         Mood and Affect: Mood normal.         Behavior: Behavior normal.             Result Review :   The following data was reviewed by: MARITA Juarez on 03/03/2025:  Common labs          10/7/2024    12:33 12/30/2024    14:24 3/3/2025    14:38   Common Labs   Glucose 102  92  95    BUN 19  17  19    Creatinine 0.96  1.15  1.10    Sodium 138  137  136    Potassium 3.9  4.3  4.3    Chloride 107  104  104    Calcium 8.6  9.3  9.4    Albumin  4.2  3.9    Total Bilirubin  0.2  <0.2    Alkaline Phosphatase  70  73    AST (SGOT)  21  21    ALT (SGPT)  22  18    WBC 7.10  11.74  10.91    Hemoglobin 8.5  12.1  14.6    Hematocrit 30.8  38.7  45.0    Platelets 211  292  217    Total Cholesterol  118  156    Triglycerides  103  82    HDL Cholesterol  32  34    LDL Cholesterol   67  106      Lipid Panel          8/12/2024    15:51 12/30/2024    14:24 3/3/2025    14:38   Lipid Panel   Total Cholesterol 102  118  156    Triglycerides 132  103  82    HDL Cholesterol 30  32  34    VLDL Cholesterol 24  19  16    LDL Cholesterol  48  67  106    LDL/HDL Ratio 1.52  2.04  3.11      Iron   Date Value Ref Range Status   03/03/2025 25 (L) 59 - 158 mcg/dL Final     Iron Saturation (TSAT)   Date Value Ref Range Status   03/03/2025 6 (L) 20 - 50 % Final     Ferritin   Date Value Ref Range Status   03/03/2025  64.30 30.00 - 400.00 ng/mL Final       Data reviewed : Cardiology studies as detailed below      Last Cardiac Cath  Results for orders placed during the hospital encounter of 03/26/24    Cardiac Catheterization/Vascular Study    Conclusion    Mid RCA-2 lesion is 60% stenosed.    Dist RCA lesion is 100% stenosed.    RPAV lesion is 95% stenosed.    1.  Cardiac.  Patient with acute coronary syndrome involving the right coronary artery with 100% stenosis prior to intervention.  PTCA and stent placement to the right coronary artery done.  Angioplasty and stent of the posterolateral branch done also      Last Stress test       Last Echo  Results for orders placed during the hospital encounter of 06/16/24    Adult Transthoracic Echo Limited W/ Cont if Necessary Per Protocol    Interpretation Summary    Left ventricular ejection fraction appears to be 26 - 30%.    The left ventricular cavity is borderline dilated.             Current Outpatient Medications   Medication Sig Dispense Refill    aspirin 81 MG EC tablet Take 1 tablet by mouth Daily. 30 tablet 1    dapagliflozin Propanediol (Farxiga) 10 MG tablet Take 1 tablet by mouth Daily 90 tablet 1    ferrous sulfate 325 (65 FE) MG tablet Take 1 tablet by mouth 2 (Two) Times a Day. 60 tablet 2    metoprolol succinate XL (TOPROL-XL) 25 MG 24 hr tablet Take 0.5 tablets by mouth Daily. 90 tablet 0    nitroglycerin (NITROSTAT) 0.4 MG SL tablet Place 1 tablet under the tongue Every 5 minutes as Needed for Chest Pain (Systolic BP Greater Than 100). Take no more than 3 doses in 15 minutes. 25 tablet 0    rosuvastatin (CRESTOR) 20 MG tablet Take 1 tablet by mouth Daily. 30 tablet 1    spironolactone (ALDACTONE) 25 MG tablet Take 0.5 tablet by mouth Daily. 45 tablet 1    Vericiguat (Verquvo) 10 MG tablet Take 1 tablet by mouth Daily. 90 tablet 3    vitamin C (ASCORBIC ACID) 500 MG tablet Take 1 tablet by mouth with each dose of ferrous sulfate 60 tablet 2    clopidogrel (PLAVIX) 75  MG tablet Take 8 tablets by mouth for a single dose 24 hours after your last dose of Brilinta.  The next day after loading dose, you will begin 1 tablet daily. 98 tablet 3    pantoprazole (PROTONIX) 40 MG EC tablet Take 1 tablet by mouth Daily. (Patient not taking: Reported on 3/3/2025) 30 tablet 5    sacubitril-valsartan (Entresto) 49-51 MG tablet Take 1 tablet by mouth 2 (Two) Times a Day. 180 tablet 3     No current facility-administered medications for this visit.            Assessment and Plan    Problem List Items Addressed This Visit       Essential hypertension (Chronic)    Overview   Goals of care-maintain systolic blood pressure less than 130 and diastolic blood pressure less than 90         Current Assessment & Plan   Hypertension is Uncontrolled  Goals of Care:Medication compliance and tolerance, Systolic blood pressure <130, and Diastolic blood pressure  <80  Plan:  Up-titrate medications  Follow up:in 6 months           Mixed hyperlipidemia (Chronic)    Overview   Goals of care-maintain LDL less than 55  3/27/2024-total cholesterol 184, triglycerides 136, HDL 37,   06/09/2024-total cholesterol 103, triglycerides 101, HDL 29, LDL 55  12/30/2024-total cholesterol 119, triglycerides 103, HDL 32, LDL 67-on rosuvastatin as he is intolerant of atorvastatin  03/03/2025-total cholesterol 156, triglycerides 82, HDL 34,   LP(a) positive referred to lipid clinic for PCSK9 inhibitor therapy         Current Assessment & Plan    Dyslipidemia is Uncontrolled  Goals of care: Medication compliance and tolerance, Control progression of disease, and Maintain LDL <55  Plan: Refer to Lipid Clinic and Add PCSK9i  Follow up: in 6 months               Relevant Orders    Lipid Panel (Completed)    Lipoprotein A (LPA) (Completed)    Ambulatory Referral to Disease State Management    HFrEF (heart failure with reduced ejection fraction) (Chronic)    Overview   Goals of care-GDMT to mitigate disease  progression/exacerbation  03/26/2024-LVEF 26 to 30%  06/16/2024-LVEF 26 to 30%  10/7/2024-AICD implanted  12/30/2024-serum iron 32, TSAT 7         Relevant Medications    sacubitril-valsartan (Entresto) 49-51 MG tablet    Other Relevant Orders    Ferritin (Completed)    Iron Profile (Completed)    Ambulatory Referral to [facility] For Infusion Treatment    Coronary artery disease involving native coronary artery of native heart without angina pectoris - Primary (Chronic)    Overview   Goals of care-aggressive risk factor modification and GDMT to mitigate disease progression  03/26/20242617-EQSHI-AEN of MICHAEL to RCA and posterolateral         Relevant Medications    sacubitril-valsartan (Entresto) 49-51 MG tablet    Other Relevant Orders    Comprehensive Metabolic Panel (Completed)    CBC (No Diff) (Completed)    Ambulatory Referral to Disease State Management    Iron deficiency anemia (Chronic)    Overview   Goals of care-maintain TSAT greater than 20  03/03/2025-serum iron 25, TSAT 6, ferritin 64.3 -Injectafer ordered         Relevant Orders    Ambulatory Referral to [facility] For Infusion Treatment     Other Visit Diagnoses         Elevated Lp(a)        Relevant Orders    Ambulatory Referral to Disease State Management          Diagnoses and all orders for this visit:    1. Coronary artery disease involving native coronary artery of native heart without angina pectoris (Primary)  -     Comprehensive Metabolic Panel; Future  -     CBC (No Diff); Future  -     Ambulatory Referral to Disease State Management    2. Mixed hyperlipidemia  Assessment & Plan:   Dyslipidemia is Uncontrolled  Goals of care: Medication compliance and tolerance, Control progression of disease, and Maintain LDL <55  Plan: Refer to Lipid Clinic and Add PCSK9i  Follow up: in 6 months          Orders:  -     Lipid Panel; Future  -     Lipoprotein A (LPA); Future  -     Ambulatory Referral to Disease State Management    3. HFrEF (heart failure with reduced ejection  fraction)  -     Ferritin; Future  -     Iron Profile; Future  -     sacubitril-valsartan (Entresto) 49-51 MG tablet; Take 1 tablet by mouth 2 (Two) Times a Day.  Dispense: 180 tablet; Refill: 3  -     Ambulatory Referral to [facility] For Infusion Treatment    4. Iron deficiency anemia, unspecified iron deficiency anemia type  -     Ambulatory Referral to [facility] For Infusion Treatment    5. Essential hypertension  Assessment & Plan:  Hypertension is Uncontrolled  Goals of Care:Medication compliance and tolerance, Systolic blood pressure <130, and Diastolic blood pressure  <80  Plan:  Up-titrate medications  Follow up:in 6 months        6. Elevated Lp(a)  -     Ambulatory Referral to Disease State Management    Other orders  -     Discontinue: clopidogrel (PLAVIX) 75 MG tablet; Take 8 tablets by mouth for a single dose 24 hours after your last dose of Brilinta.  The next day after loading dose, you will begin 1 tablet daily.  Dispense: 98 tablet; Refill: 3  -     sodium chloride 0.9 % infusion  -     ferric carboxymaltose (INJECTAFER) 500 mg in sodium chloride 0.9 % 100 mL IVPB  -     Hydrocortisone Sod Suc (PF) (Solu-CORTEF) injection 100 mg  -     diphenhydrAMINE (BENADRYL) injection 50 mg  -     famotidine (PEPCID) injection 20 mg      Assessment & Plan  1. Hypertension.  Blood pressure readings have consistently been in the range of 140 to 150, which is above the desired level of less than 130. The dosage of Entresto will be increased to manage the elevated blood pressure. A prescription for a 90-day supply of the medication has been sent to [facility].    2. Iron deficiency.  Previous lab results indicated significantly low iron levels. Blood work will be conducted today to reassess iron levels. An iron infusion will be scheduled based on the results. If no communication is received by next Monday, further follow-up will be initiated.    3. Hypercholesterolemia.  Cholesterol levels have shown a slight increase from  the previous goal. A recheck of cholesterol levels will be conducted today. The current medication regimen includes rosuvastatin 20 mg. If cholesterol levels remain elevated, adjustments to the medication regimen will be considered.    4. Smoking cessation.  The patient continues to smoke a pack a day. Previous attempts with Chantix and nicotine patches were unsuccessful, and insurance does not cover Wellbutrin. .    5. Medication management.  If the cost of Brilinta is not reduced, a transition to Plavix will be initiated. The patient will take 8 tablets of Plavix 24 hours after the last dose of Brilinta as a loading dose, followed by a maintenance dose of 1 tablet daily.         Follow Up     Return in about 3 months (around 6/3/2025).    Patient was given instructions and counseling regarding his condition or for health maintenance advice. Please see specific information pulled into the AVS if appropriate.       Electronically signed by MARITA Juarez, 03/09/25, 11:24 AM EDT.    Patient or patient representative verbalized consent for the use of Ambient Listening during the visit with  MARITA Juarez for chart documentation. 3/9/2025  11:24 EDT     Dictated Utilizing Dragon Dictation: Part of this note may be an electronic transcription/translation of spoken language to printed text using the Dragon Dictation System

## 2025-03-04 LAB
ALBUMIN SERPL-MCNC: 3.9 G/DL (ref 3.5–5.2)
ALBUMIN/GLOB SERPL: 1.1 G/DL
ALP SERPL-CCNC: 73 U/L (ref 39–117)
ALT SERPL W P-5'-P-CCNC: 18 U/L (ref 1–41)
ANION GAP SERPL CALCULATED.3IONS-SCNC: 13.6 MMOL/L (ref 5–15)
AST SERPL-CCNC: 21 U/L (ref 1–40)
BILIRUB SERPL-MCNC: <0.2 MG/DL (ref 0–1.2)
BUN SERPL-MCNC: 19 MG/DL (ref 6–20)
BUN/CREAT SERPL: 17.3 (ref 7–25)
CALCIUM SPEC-SCNC: 9.4 MG/DL (ref 8.6–10.5)
CHLORIDE SERPL-SCNC: 104 MMOL/L (ref 98–107)
CHOLEST SERPL-MCNC: 156 MG/DL (ref 0–200)
CO2 SERPL-SCNC: 18.4 MMOL/L (ref 22–29)
CREAT SERPL-MCNC: 1.1 MG/DL (ref 0.76–1.27)
DEPRECATED RDW RBC AUTO: 47.3 FL (ref 37–54)
EGFRCR SERPLBLD CKD-EPI 2021: 78.3 ML/MIN/1.73
ERYTHROCYTE [DISTWIDTH] IN BLOOD BY AUTOMATED COUNT: 15.8 % (ref 12.3–15.4)
FERRITIN SERPL-MCNC: 64.3 NG/ML (ref 30–400)
GLOBULIN UR ELPH-MCNC: 3.6 GM/DL
GLUCOSE SERPL-MCNC: 95 MG/DL (ref 65–99)
HCT VFR BLD AUTO: 45 % (ref 37.5–51)
HDLC SERPL-MCNC: 34 MG/DL (ref 40–60)
HGB BLD-MCNC: 14.6 G/DL (ref 13–17.7)
IRON 24H UR-MRATE: 25 MCG/DL (ref 59–158)
IRON SATN MFR SERPL: 6 % (ref 20–50)
LDLC SERPL CALC-MCNC: 106 MG/DL (ref 0–100)
LDLC/HDLC SERPL: 3.11 {RATIO}
LPA SERPL-SCNC: 218 NMOL/L
MCH RBC QN AUTO: 27.4 PG (ref 26.6–33)
MCHC RBC AUTO-ENTMCNC: 32.4 G/DL (ref 31.5–35.7)
MCV RBC AUTO: 84.4 FL (ref 79–97)
PLATELET # BLD AUTO: 217 10*3/MM3 (ref 140–450)
POTASSIUM SERPL-SCNC: 4.3 MMOL/L (ref 3.5–5.2)
PROT SERPL-MCNC: 7.5 G/DL (ref 6–8.5)
RBC # BLD AUTO: 5.33 10*6/MM3 (ref 4.14–5.8)
SODIUM SERPL-SCNC: 136 MMOL/L (ref 136–145)
TIBC SERPL-MCNC: 447 MCG/DL (ref 298–536)
TRANSFERRIN SERPL-MCNC: 300 MG/DL (ref 200–360)
TRIGL SERPL-MCNC: 82 MG/DL (ref 0–150)
VLDLC SERPL-MCNC: 16 MG/DL (ref 5–40)
WBC NRBC COR # BLD AUTO: 10.91 10*3/MM3 (ref 3.4–10.8)

## 2025-03-06 ENCOUNTER — DISEASE STATE MANAGEMENT VISIT (OUTPATIENT)
Dept: CARDIOLOGY | Facility: HOSPITAL | Age: 60
End: 2025-03-06
Payer: COMMERCIAL

## 2025-03-06 NOTE — PROGRESS NOTES
Filling 90 ds of Verquvo, Toprol and Spironolactone. I counseled him that his Entresto dose had been increased and he could use 2 tablets bid. Pt is going to use his current stock of Entresto 24/26mg bid and will call when he runs out of it.     Rhina Sandra, PharmD  03/06/25  09:42 EST

## 2025-03-09 PROBLEM — D50.9 IRON DEFICIENCY ANEMIA: Chronic | Status: ACTIVE | Noted: 2024-06-11

## 2025-03-09 PROBLEM — D50.9 IRON DEFICIENCY ANEMIA: Status: ACTIVE | Noted: 2024-06-11

## 2025-03-09 RX ORDER — SODIUM CHLORIDE 9 MG/ML
20 INJECTION, SOLUTION INTRAVENOUS ONCE
OUTPATIENT
Start: 2025-03-10

## 2025-03-09 RX ORDER — DIPHENHYDRAMINE HYDROCHLORIDE 50 MG/ML
50 INJECTION INTRAMUSCULAR; INTRAVENOUS AS NEEDED
OUTPATIENT
Start: 2025-03-10

## 2025-03-09 RX ORDER — FAMOTIDINE 10 MG/ML
20 INJECTION, SOLUTION INTRAVENOUS AS NEEDED
OUTPATIENT
Start: 2025-03-10

## 2025-03-09 RX ORDER — HYDROCORTISONE SODIUM SUCCINATE 100 MG/2ML
100 INJECTION INTRAMUSCULAR; INTRAVENOUS AS NEEDED
OUTPATIENT
Start: 2025-03-10

## 2025-03-09 NOTE — ASSESSMENT & PLAN NOTE
HF Classification: Heart Failure with reduced Ejection Fraction (40% or lower)    NYHA Class:  Class II: Slight limitation of physical activity. Comfortable at rest Ordinary physical activity results in fatigue, palpitation, dyspnea (shortness of breath)    HF Plan of Care:  GDMT Beta Blocker, SGLT2 Inhibitor, ARNI/ACE/ARB, and MRA; Additional Recommendations Iron studies ordered with plan for IV Injectafer infusion if TSAT less than 20

## 2025-03-09 NOTE — ASSESSMENT & PLAN NOTE
Dyslipidemia is Uncontrolled  Goals of care: Medication compliance and tolerance, Control progression of disease, and Maintain LDL <55  Plan: Refer to Lipid Clinic and Add PCSK9i  Follow up: in 6 months

## 2025-03-09 NOTE — ASSESSMENT & PLAN NOTE
Hypertension is Uncontrolled  Goals of Care:Medication compliance and tolerance, Systolic blood pressure <130, and Diastolic blood pressure  <80  Plan:  Up-titrate medications  Follow up:in 6 months

## 2025-03-10 DIAGNOSIS — E78.2 MIXED HYPERLIPIDEMIA: ICD-10-CM

## 2025-03-10 DIAGNOSIS — E78.41 ELEVATED LP(A): Primary | ICD-10-CM

## 2025-03-18 ENCOUNTER — INFUSION (OUTPATIENT)
Dept: ONCOLOGY | Facility: HOSPITAL | Age: 60
End: 2025-03-18
Payer: COMMERCIAL

## 2025-03-18 VITALS
WEIGHT: 227.3 LBS | DIASTOLIC BLOOD PRESSURE: 67 MMHG | SYSTOLIC BLOOD PRESSURE: 107 MMHG | RESPIRATION RATE: 18 BRPM | HEART RATE: 97 BPM | OXYGEN SATURATION: 97 % | TEMPERATURE: 98.2 F | BODY MASS INDEX: 31.7 KG/M2

## 2025-03-18 DIAGNOSIS — D50.9 IRON DEFICIENCY ANEMIA, UNSPECIFIED IRON DEFICIENCY ANEMIA TYPE: ICD-10-CM

## 2025-03-18 DIAGNOSIS — I50.20 HFREF (HEART FAILURE WITH REDUCED EJECTION FRACTION): Primary | ICD-10-CM

## 2025-03-18 PROCEDURE — 25010000002 FERRIC CARBOXYMALTOSE 750 MG/15ML SOLUTION 15 ML VIAL: Performed by: NURSE PRACTITIONER

## 2025-03-18 PROCEDURE — 96365 THER/PROPH/DIAG IV INF INIT: CPT

## 2025-03-18 RX ORDER — SODIUM CHLORIDE 9 MG/ML
20 INJECTION, SOLUTION INTRAVENOUS ONCE
OUTPATIENT
Start: 2025-03-25

## 2025-03-18 RX ORDER — HYDROCORTISONE SODIUM SUCCINATE 100 MG/2ML
100 INJECTION INTRAMUSCULAR; INTRAVENOUS AS NEEDED
OUTPATIENT
Start: 2025-03-25

## 2025-03-18 RX ORDER — FAMOTIDINE 10 MG/ML
20 INJECTION, SOLUTION INTRAVENOUS AS NEEDED
OUTPATIENT
Start: 2025-03-25

## 2025-03-18 RX ORDER — DIPHENHYDRAMINE HYDROCHLORIDE 50 MG/ML
50 INJECTION INTRAMUSCULAR; INTRAVENOUS AS NEEDED
OUTPATIENT
Start: 2025-03-25

## 2025-03-18 RX ORDER — SODIUM CHLORIDE 9 MG/ML
20 INJECTION, SOLUTION INTRAVENOUS ONCE
Status: DISCONTINUED | OUTPATIENT
Start: 2025-03-18 | End: 2025-03-18

## 2025-03-18 RX ADMIN — FERRIC CARBOXYMALTOSE INJECTION 500 MG: 50 INJECTION, SOLUTION INTRAVENOUS at 15:29

## 2025-04-04 ENCOUNTER — OFFICE VISIT (OUTPATIENT)
Dept: CARDIOLOGY | Facility: CLINIC | Age: 60
End: 2025-04-04
Payer: COMMERCIAL

## 2025-04-04 VITALS
WEIGHT: 222.8 LBS | DIASTOLIC BLOOD PRESSURE: 66 MMHG | HEIGHT: 71 IN | HEART RATE: 96 BPM | OXYGEN SATURATION: 96 % | BODY MASS INDEX: 31.19 KG/M2 | SYSTOLIC BLOOD PRESSURE: 116 MMHG

## 2025-04-04 DIAGNOSIS — Z95.810 ICD (IMPLANTABLE CARDIOVERTER-DEFIBRILLATOR), DUAL, IN SITU: Primary | ICD-10-CM

## 2025-04-04 DIAGNOSIS — I25.10 CORONARY ARTERY DISEASE INVOLVING NATIVE CORONARY ARTERY OF NATIVE HEART WITHOUT ANGINA PECTORIS: Chronic | ICD-10-CM

## 2025-04-04 DIAGNOSIS — I10 ESSENTIAL HYPERTENSION: Chronic | ICD-10-CM

## 2025-04-07 ENCOUNTER — TELEPHONE (OUTPATIENT)
Dept: PHARMACY | Facility: HOSPITAL | Age: 60
End: 2025-04-07
Payer: COMMERCIAL

## 2025-04-07 NOTE — TELEPHONE ENCOUNTER
After receiving a referral for the patient to start cholesterol injections, I did a prior authorization for Praluent 75mg due to the fact that the patient has documented in his chart that he has a Latex allergy. Praluent was denied, but I did send an appeal today to attempt to get it approved. (I tried to contact the patient to be sure that he has a true latex allergy but was unable to contact him) Praluent appeal was denied. (4-9-25)  Repatha is approved on his insurance without any prior authorization. (The patient can not take due to latex allergy? If he has a true latex allergy)  Leqvio is not covered on prescription coverage at all.     It seems like the insurance is only covering Repatha. On the other medication denials it does say that the medications aren't on the list of covered medication and the patient is able to pay full price for the medication   *we can not mail to [place], so the patient would have to come to pick the medication up from our pharmacy if we manage his lipids.*        I have attempted to contact the patient several times but the patients voicemail box is full and I haven't been able to leave a message for him to call back.  At this time I will not continue to reach out to the patient. The patient  can contact me to get further details if needed. Yvette @ [phone].

## 2025-04-10 NOTE — PROGRESS NOTES
Cardiac Electrophysiology Outpatient Note  [facility]    Office Visit     Dave Townsend  [id number]  07/16/2022    Primary Care Physician: Claudio Jane MD    Referred By: No ref. provider found    Subjective     Chief Complaint   Patient presents with    HFrEF (heart failure with reduced ejection fraction)     Problem List:  CAD  [facility] Dr. Mendosa 3/26/2024: Mid RCA 60% stenosis, distal % stenosis s/p stent, PLB 95% stenosis s/p stent  Ischemic cardiomyopathy  TTE 3/2024: LVEF 26-30%, normal diastolic function, mild MR  TTE 6/2024: LVEF 26-30%  Hypertension  Dyslipidemia        History of Present Illness:   Dave Townsend is a 59 y.o. male who presents to my electrophysiology clinic for follow-up of ischemic cardiomyopathy status post primary prevention ICD implantation.  ICD was implanted in October 2024.    Since then he is overall done well.  Denies any major symptoms.  No chest pain, shortness of breath, presyncope, syncope, palpitation, lower extremity swelling.  No problems with the ICD site.  Continues to smoke approximately 1 pack of cigarettes per day.    Past Medical History:   Diagnosis Date    Abnormal ECG     Hypertension     STEMI (ST elevation myocardial infarction) 03/26/2024       Past Surgical History:   Procedure Laterality Date    CARDIAC CATHETERIZATION N/A 03/26/2024    Procedure: Left Heart Cath;  Surgeon: Barry Mendosa MD;  Location:  [facility]ATH INVASIVE LOCATION;  Service: Cardiovascular;  Laterality: N/A;    CARDIAC ELECTROPHYSIOLOGY PROCEDURE N/A 10/7/2024    Procedure: Bi[employer]ronik single-chamber ICD; no meds to hold;  Surgeon: Haja Esteban MD;  Location:  [facility];  Service: Cardiovascular;  Laterality: N/A;    CORONARY ANGIOPLASTY WITH STENT PLACEMENT  2017    at [facility]       Family History   Problem Relation Age of Onset    Heart disease Mother     Emphysema Father        Social History     Socioeconomic  History    Marital status: Single   Tobacco Use    Smoking status: Every Day     Current packs/day: 1.00     Average packs/day: 1 pack/day for 40.3 years (40.3 ttl pk-yrs)     Types: Cigarettes     Start date: 1985     Passive exposure: Current    Smokeless tobacco: Former     Types: Snuff   Vaping Use    Vaping status: Never Used   Substance and Sexual Activity    Alcohol use: Never    Drug use: Never    Sexual activity: Defer         Current Outpatient Medications:     aspirin 81 MG EC tablet, Take 1 tablet by mouth Daily., Disp: 30 tablet, Rfl: 1    clopidogrel (PLAVIX) 75 MG tablet, Take 8 tablets by mouth for a single dose 24 hours after your last dose of Brilinta.  The next day after loading dose, you will begin 1 tablet daily., Disp: 98 tablet, Rfl: 3    dapagliflozin Propanediol (Farxiga) 10 MG tablet, Take 1 tablet by mouth Daily, Disp: 90 tablet, Rfl: 1    ferrous sulfate 325 (65 FE) MG tablet, Take 1 tablet by mouth 2 (Two) Times a Day., Disp: 60 tablet, Rfl: 2    metoprolol succinate XL (TOPROL-XL) 25 MG 24 hr tablet, Take 0.5 tablets by mouth Daily., Disp: 90 tablet, Rfl: 0    nitroglycerin (NITROSTAT) 0.4 MG SL tablet, Place 1 tablet under the tongue Every 5 minutes as Needed for Chest Pain (Systolic BP Greater Than 100). Take no more than 3 doses in 15 minutes., Disp: 25 tablet, Rfl: 0    rosuvastatin (CRESTOR) 20 MG tablet, Take 1 tablet by mouth Daily., Disp: 30 tablet, Rfl: 1    sacubitril-valsartan (Entresto) 49-51 MG tablet, Take 1 tablet by mouth 2 (Two) Times a Day., Disp: 180 tablet, Rfl: 3    spironolactone (ALDACTONE) 25 MG tablet, Take 0.5 tablet by mouth Daily., Disp: 45 tablet, Rfl: 1    Vericiguat (Verquvo) 10 MG tablet, Take 1 tablet by mouth Daily., Disp: 90 tablet, Rfl: 3    vitamin C (ASCORBIC ACID) 500 MG tablet, Take 1 tablet by mouth with each dose of ferrous sulfate, Disp: 60 tablet, Rfl: 2    pantoprazole (PROTONIX) 40 MG EC tablet, Take 1 tablet by mouth Daily. (Patient not  "taking: Reported on 4/4/2025), Disp: 30 tablet, Rfl: 5    Allergies:   Allergies   Allergen Reactions    Atorvastatin Myalgia    Latex Rash       Objective   Vital Signs: Blood pressure 116/66, pulse 96, height 180.3 cm (71\"), weight 101 kg (222 lb 12.8 oz), SpO2 96%.    PHYSICAL EXAM  General appearance: Awake, alert, cooperative  Head: Normocephalic, without obvious abnormality, atraumatic  Neck: No JVD  Lungs: Clear to ascultation bilaterally  Heart: Regular rate and rhythm, no murmurs, 2+ LE pulses, no lower extremity swelling  Skin: Skin color, turgor normal, no rashes or lesions  Neurologic: Grossly normal     Lab Results   Component Value Date    GLUCOSE 95 03/03/2025    CALCIUM 9.4 03/03/2025     03/03/2025    K 4.3 03/03/2025    CO2 18.4 (L) 03/03/2025     03/03/2025    BUN 19 03/03/2025    CREATININE 1.10 03/03/2025    BCR 17.3 03/03/2025    ANIONGAP 13.6 03/03/2025     Lab Results   Component Value Date    WBC 10.91 (H) 03/03/2025    HGB 14.6 03/03/2025    HCT 45.0 03/03/2025    MCV 84.4 03/03/2025     03/03/2025     No results found for: \"INR\", \"PROTIME\"  Lab Results   Component Value Date    TSH 6.010 (H) 06/09/2024          Results for orders placed during the hospital encounter of 06/16/24    Adult Transthoracic Echo Limited W/ Cont if Necessary Per Protocol    Interpretation Summary    Left ventricular ejection fraction appears to be 26 - 30%.    The left ventricular cavity is borderline dilated.     Results for orders placed during the hospital encounter of 03/26/24    Cardiac Catheterization/Vascular Study    Conclusion    Mid RCA-2 lesion is 60% stenosed.    Dist RCA lesion is 100% stenosed.    RPAV lesion is 95% stenosed.    1.  Cardiac.  Patient with acute coronary syndrome involving the right coronary artery with 100% stenosis prior to intervention.  PTCA and stent placement to the right coronary artery done.  Angioplasty and stent of the posterolateral branch done " also      I personally viewed and interpreted the patient's EKG/Telemetry/lab data    Procedures    Dave Townsend  reports that he has been smoking cigarettes. He started smoking about 40 years ago. He has a 40.3 pack-year smoking history. He has been exposed to tobacco smoke. He has quit using smokeless tobacco.  His smokeless tobacco use included snuff.     Assessment & Plan    1.  Status post ICD  Biotronik single-chamber ICD with atrial sensing was interrogated today and is functioning normally.  He is approximately 13 years of battery life remaining.  He is pacing 0% of the time in the ventricle.  Outputs changed to chronic settings today.    2. HFrEF (heart failure with reduced ejection fraction)  History of ischemic cardiomyopathy status post primary prevention ICD.  Currently well compensated.  Will continue current therapy    3. Essential hypertension  Blood pressure is well-controlled today.  Will continue current regimen.       Follow Up:  Return in about 1 year (around 4/4/2026) for BTK.      Thank you for allowing me to participate in the care of your patient. Please do not hesitate to contact me with additional questions or concerns.      Haja Esteban M.D.  Cardiac Electrophysiologist  [facility] / [facility]

## 2025-04-22 ENCOUNTER — INFUSION (OUTPATIENT)
Dept: ONCOLOGY | Facility: HOSPITAL | Age: 60
End: 2025-04-22
Payer: MEDICAID

## 2025-04-22 VITALS
WEIGHT: 225.7 LBS | DIASTOLIC BLOOD PRESSURE: 73 MMHG | HEART RATE: 75 BPM | OXYGEN SATURATION: 97 % | SYSTOLIC BLOOD PRESSURE: 129 MMHG | RESPIRATION RATE: 18 BRPM | BODY MASS INDEX: 31.48 KG/M2 | TEMPERATURE: 97.7 F

## 2025-04-22 DIAGNOSIS — D50.9 IRON DEFICIENCY ANEMIA, UNSPECIFIED IRON DEFICIENCY ANEMIA TYPE: ICD-10-CM

## 2025-04-22 DIAGNOSIS — I50.20 HFREF (HEART FAILURE WITH REDUCED EJECTION FRACTION): Primary | ICD-10-CM

## 2025-04-22 PROCEDURE — 25010000002 FERRIC CARBOXYMALTOSE 750 MG/15ML SOLUTION 15 ML VIAL: Performed by: NURSE PRACTITIONER

## 2025-04-22 PROCEDURE — 96374 THER/PROPH/DIAG INJ IV PUSH: CPT

## 2025-04-22 PROCEDURE — 25810000003 SODIUM CHLORIDE 0.9 % SOLUTION: Performed by: NURSE PRACTITIONER

## 2025-04-22 PROCEDURE — 96365 THER/PROPH/DIAG IV INF INIT: CPT

## 2025-04-22 RX ORDER — HYDROCORTISONE SODIUM SUCCINATE 100 MG/2ML
100 INJECTION INTRAMUSCULAR; INTRAVENOUS AS NEEDED
OUTPATIENT
Start: 2025-04-29

## 2025-04-22 RX ORDER — FAMOTIDINE 10 MG/ML
20 INJECTION, SOLUTION INTRAVENOUS AS NEEDED
OUTPATIENT
Start: 2025-04-29

## 2025-04-22 RX ORDER — SODIUM CHLORIDE 9 MG/ML
20 INJECTION, SOLUTION INTRAVENOUS ONCE
Status: CANCELLED | OUTPATIENT
Start: 2025-04-29

## 2025-04-22 RX ORDER — SODIUM CHLORIDE 9 MG/ML
20 INJECTION, SOLUTION INTRAVENOUS ONCE
Status: COMPLETED | OUTPATIENT
Start: 2025-04-22 | End: 2025-04-22

## 2025-04-22 RX ORDER — DIPHENHYDRAMINE HYDROCHLORIDE 50 MG/ML
50 INJECTION, SOLUTION INTRAMUSCULAR; INTRAVENOUS AS NEEDED
OUTPATIENT
Start: 2025-04-29

## 2025-04-22 RX ADMIN — FERRIC CARBOXYMALTOSE INJECTION 500 MG: 50 INJECTION, SOLUTION INTRAVENOUS at 15:34

## 2025-04-22 RX ADMIN — SODIUM CHLORIDE 20 ML/HR: 9 INJECTION, SOLUTION INTRAVENOUS at 15:34

## 2025-05-14 ENCOUNTER — DISEASE STATE MANAGEMENT VISIT (OUTPATIENT)
Dept: PHARMACY | Facility: HOSPITAL | Age: 60
End: 2025-05-14
Payer: MEDICAID

## 2025-05-15 ENCOUNTER — HOSPITAL ENCOUNTER (OUTPATIENT)
Dept: CARDIOLOGY | Facility: HOSPITAL | Age: 60
Discharge: HOME | End: 2025-05-15
Payer: MEDICAID

## 2025-05-15 VITALS
WEIGHT: 226 LBS | DIASTOLIC BLOOD PRESSURE: 68 MMHG | OXYGEN SATURATION: 97 % | BODY MASS INDEX: 31.52 KG/M2 | HEART RATE: 91 BPM | SYSTOLIC BLOOD PRESSURE: 104 MMHG

## 2025-05-15 DIAGNOSIS — E66.811 CLASS 1 OBESITY: ICD-10-CM

## 2025-05-15 DIAGNOSIS — I50.20 HFREF (HEART FAILURE WITH REDUCED EJECTION FRACTION): Primary | Chronic | ICD-10-CM

## 2025-05-15 DIAGNOSIS — I10 ESSENTIAL HYPERTENSION: Chronic | ICD-10-CM

## 2025-05-15 DIAGNOSIS — I25.10 CORONARY ARTERY DISEASE INVOLVING NATIVE CORONARY ARTERY OF NATIVE HEART WITHOUT ANGINA PECTORIS: Chronic | ICD-10-CM

## 2025-05-15 LAB
ABSOLUTE LUNG FLUID CONTENT: 33 % (ref 20–35)
ANION GAP SERPL CALCULATED.3IONS-SCNC: 13 MMOL/L (ref 5–15)
BUN SERPL-MCNC: 18 MG/DL (ref 6–20)
BUN/CREAT SERPL: 17.1 (ref 7–25)
CALCIUM SPEC-SCNC: 9.4 MG/DL (ref 8.6–10.5)
CHLORIDE SERPL-SCNC: 105 MMOL/L (ref 98–107)
CO2 SERPL-SCNC: 18 MMOL/L (ref 22–29)
CREAT SERPL-MCNC: 1.05 MG/DL (ref 0.76–1.27)
EGFRCR SERPLBLD CKD-EPI 2021: 82.8 ML/MIN/1.73
GLUCOSE SERPL-MCNC: 107 MG/DL (ref 65–99)
MAGNESIUM SERPL-MCNC: 2 MG/DL (ref 1.6–2.6)
NT-PROBNP SERPL-MCNC: 136.7 PG/ML (ref 0–900)
POTASSIUM SERPL-SCNC: 4 MMOL/L (ref 3.5–5.2)
SODIUM SERPL-SCNC: 136 MMOL/L (ref 136–145)

## 2025-05-15 PROCEDURE — 83735 ASSAY OF MAGNESIUM: CPT | Performed by: PHYSICIAN ASSISTANT

## 2025-05-15 PROCEDURE — 83880 ASSAY OF NATRIURETIC PEPTIDE: CPT | Performed by: PHYSICIAN ASSISTANT

## 2025-05-15 PROCEDURE — 80048 BASIC METABOLIC PNL TOTAL CA: CPT | Performed by: PHYSICIAN ASSISTANT

## 2025-05-15 PROCEDURE — 94726 PLETHYSMOGRAPHY LUNG VOLUMES: CPT | Performed by: PHYSICIAN ASSISTANT

## 2025-05-15 PROCEDURE — 36415 COLL VENOUS BLD VENIPUNCTURE: CPT | Performed by: PHYSICIAN ASSISTANT

## 2025-05-15 RX ORDER — NITROGLYCERIN 0.4 MG/1
0.4 TABLET SUBLINGUAL
Qty: 25 TABLET | Refills: 0 | Status: SHIPPED | OUTPATIENT
Start: 2025-05-15

## 2025-05-15 RX ORDER — VERICIGUAT 10 MG/1
10 TABLET, FILM COATED ORAL DAILY
Qty: 90 TABLET | Refills: 3 | Status: SHIPPED | OUTPATIENT
Start: 2025-05-15 | End: 2026-05-15

## 2025-05-15 RX ORDER — METOPROLOL SUCCINATE 25 MG/1
12.5 TABLET, EXTENDED RELEASE ORAL
Qty: 90 TABLET | Refills: 0 | Status: SHIPPED | OUTPATIENT
Start: 2025-05-15 | End: 2025-11-11

## 2025-05-15 RX ORDER — SACUBITRIL AND VALSARTAN 49; 51 MG/1; MG/1
1 TABLET, FILM COATED ORAL 2 TIMES DAILY
Qty: 180 TABLET | Refills: 3 | Status: SHIPPED | OUTPATIENT
Start: 2025-05-15

## 2025-05-15 RX ORDER — DAPAGLIFLOZIN 10 MG/1
10 TABLET, FILM COATED ORAL DAILY
Qty: 90 TABLET | Refills: 1 | Status: SHIPPED | OUTPATIENT
Start: 2025-05-15 | End: 2025-11-11

## 2025-05-15 NOTE — PROGRESS NOTES
" Heart Failure Clinic  Pharmacist Note     Dave Townsend is a 59 y.o. male seen in the Heart Failure Clinic for HFrEF for most EF of 26-30% on 3/28/24.    Dave Townsend reports a fair understanding of medications, and reports adherence to his doses. He denies any SOB or swelling and is currently not on a diuretic.     He reports that he checks his BP daily and it runs \"good\" mostly in the 100-110's. He reports lightheadedness all of the time and that it is his normal. These episodes of lightheadedness did not increase with recent increase in entresto dose by cardiology.     He reports that he now has insurance.     Medication Use:   Hx of med intolerances:  None related to HF  Retail Rx Management: [facility] lives in [place]- can mail to his niece in [place]    Past Medical History:   Diagnosis Date    Abnormal ECG     Hypertension     STEMI (ST elevation myocardial infarction) 03/26/2024     ALLERGIES: Atorvastatin and Latex  Current Outpatient Medications   Medication Sig Dispense Refill    aspirin 81 MG EC tablet Take 1 tablet by mouth Daily. 30 tablet 1    clopidogrel (PLAVIX) 75 MG tablet Take 8 tablets by mouth for a single dose 24 hours after your last dose of Brilinta.  The next day after loading dose, you will begin 1 tablet daily. 98 tablet 3    dapagliflozin Propanediol (Farxiga) 10 MG tablet Take 1 tablet by mouth Daily 90 tablet 1    metoprolol succinate XL (TOPROL-XL) 25 MG 24 hr tablet Take 0.5 tablets by mouth Daily. 90 tablet 0    nitroglycerin (NITROSTAT) 0.4 MG SL tablet Place 1 tablet under the tongue Every 5 minutes as Needed for Chest Pain (Systolic BP Greater Than 100). Take no more than 3 doses in 15 minutes. 25 tablet 0    rosuvastatin (CRESTOR) 20 MG tablet Take 1 tablet by mouth Daily. 30 tablet 1    sacubitril-valsartan (Entresto) 49-51 MG tablet Take 1 tablet by mouth 2 (Two) Times a Day. 180 tablet 3    spironolactone (ALDACTONE) 25 MG tablet Take 0.5 tablet by mouth Daily. 45 tablet 1    " Vericiguat (Verquvo) 10 MG tablet Take 1 tablet by mouth Daily. 90 tablet 3    ferrous sulfate 325 (65 FE) MG tablet Take 1 tablet by mouth 2 (Two) Times a Day. (Patient not taking: Reported on 5/15/2025) 60 tablet 2    vitamin C (ASCORBIC ACID) 500 MG tablet Take 1 tablet by mouth with each dose of ferrous sulfate (Patient not taking: Reported on 5/15/2025) 60 tablet 2     No current facility-administered medications for this encounter.       Vaccination History:   Pneumonia: Got sick with a past dose and is not interested in   Annual Influenza: Usually does not get- see during next flu season  Shingles: Not interested in    Objective  Vitals:    05/15/25 1346   BP: 104/68   BP Location: Right arm   Patient Position: Sitting   Cuff Size: Adult   Pulse: 91   SpO2: 97%   Weight: 103 kg (226 lb)     Wt Readings from Last 3 Encounters:   05/15/25 103 kg (226 lb)   04/22/25 102 kg (225 lb 11.2 oz)   04/04/25 101 kg (222 lb 12.8 oz)         05/15/25  1346   Weight: 103 kg (226 lb)     Lab Results   Component Value Date    GLUCOSE 107 (H) 05/15/2025    BUN 18 05/15/2025    CREATININE 1.05 05/15/2025    BCR 17.1 05/15/2025    K 4.0 05/15/2025    CO2 18.0 (L) 05/15/2025    CALCIUM 9.4 05/15/2025    ALBUMIN 3.9 03/03/2025    AST 21 03/03/2025    ALT 18 03/03/2025     Lab Results   Component Value Date    WBC 10.91 (H) 03/03/2025    HGB 14.6 03/03/2025    HCT 45.0 03/03/2025    MCV 84.4 03/03/2025     03/03/2025     Lab Results   Component Value Date    TROPONINT 2,243 (C) 03/28/2024     Lab Results   Component Value Date    PROBNP 136.7 05/15/2025     Results for orders placed during the hospital encounter of 06/16/24    Adult Transthoracic Echo Limited W/ Cont if Necessary Per Protocol    Interpretation Summary    Left ventricular ejection fraction appears to be 26 - 30%.    The left ventricular cavity is borderline dilated.         GDMT    Drug Class   Drug   Dose Last Dose Adjustment Additional Titration   Notes    ACEi/ARB/ARNI Entresto 49/51mg 3/3/25 increased Cinthia      Beta Blocker Toprol 12.5mg ~ 3/2024     MRA Spironolactone 12.5mg 5/9/24 Katina     SGLT2i Farxiga 10mg 4/28/24  Katina      Verquvo 10mg 5/20/24         Drug Therapy Problems    1. Refills- nitroglycerin      Recommendations:     Julee to send in and I will refill with his [facility] meds and ship to him      Patient was educated on heart failure medications and the importance of medication adherence. All questions were addressed and patient expressed understanding. Used teach-back method to assess understanding.     Thank you for allowing me to participate in the care of your patient,    Rhina Sandra, PharmD  05/15/25  16:21 EDT

## 2025-05-15 NOTE — PATIENT INSTRUCTIONS
Heart Failure Action Plan  A heart failure action plan helps you know what to do when you have symptoms of heart failure. Your action plan is a color-coded plan that lists the symptoms to watch for and indicates what actions to take.  If you have symptoms in the green zone, you're doing well.  If you have symptoms in the yellow zone, you're having problems.  If you have symptoms in the red zone, you need medical care right away.  Follow the plan that was created by you and your health care provider. Review your plan each time you visit your provider.  Green zone  These signs mean you're doing well and can continue what you're doing:  You don't have new or worsening shortness of breath.  You have very little swelling or no new swelling.  Your weight is stable (no gain or loss).  You have a normal activity level.  You don't have chest pain or any other new symptoms.  Yellow zone  These signs and symptoms mean your condition may be getting worse and you should make some changes:  You have trouble breathing when you're active.  You have swelling in your feet or legs or have discomfort in your belly.  You gain 2-3 lb (0.9-1.4 kg) in 24 hours, or 5 lb (2.3 kg) in a week. This amount may be more or less depending on your condition.  You get tired easily.  You have trouble sleeping.  You have a dry cough.  If you have any of these symptoms:  Contact your provider within the next day.  Your provider may adjust your medicines.  Red zone  These signs and symptoms mean you should get medical help right away:  You have trouble breathing when resting or cannot lie flat and you need to raise your head to help you breathe.  You have a dry cough that's getting worse.  You have swelling or pain in your feet or legs or discomfort in your belly that's getting worse.  You suddenly gain more than 2-3 lb (0.9-1.4 kg) in 24 hours, or more than 5 lb (2.3 kg) in a week. This amount may be more or less depending on your condition.  You have  trouble staying awake or you feel confused.  You don't have an appetite.  You have worsening sadness or depression.  These symptoms may be an emergency. Call 911 right away.  Do not wait to see if the symptoms will go away.  Do not drive yourself to the hospital.  Follow these instructions at home:  Take medicines only as told.  Eat a heart-healthy diet. Work with a dietitian to create an eating plan that's best for you.  Weigh yourself each day.   Call your provider if you gain more than 3 lb in 24 hours, or more than 5 lb in a week.  Health care provider name: Julee [facility] care provider phone number: [phone]

## 2025-05-15 NOTE — PROGRESS NOTES
Heart Failure Clinic    Date: 05/15/25     Vitals:    05/15/25 1346   BP: 104/68   Pulse: 91   SpO2: 97%    Weight: 226lbs    Method of arrival: Ambulatory    Weighing self daily: No    Monitoring Heart Failure Zones: No    Today's HF Zone: Green    Taking medications as prescribed: Yes    Edema No    Shortness of Air: No    Number of pillows used at night:<2    Educational Materials given:  AVS GIVEN                                                                         ReDS Value: 33  25-35 Optimal Value Status      Yanna Shah RN 05/15/25 13:46 EDT

## 2025-05-15 NOTE — PROGRESS NOTES
[facility]  MEG Godoy Paul Christen, MD  [address] [place] [address]    Thank you for asking me to see Dave Townsend for congestive heart failure.    HPI:     This is a 59 y.o. male with known past medical history of:    HFrEF  TTE 3/2024: LVEF 26-30%, normal diastolic function, mild MR  TTE 6/2024: LVEF 26-30%  10/7/24 Biotronik single chamber ICD placed  ASCVD status post STEMI in March 2024  LHC in March 2024 PCI drug-eluting stent to the RCA and 2 stents to the posterolateral branch  Essential hypertension  Hyperlipidemia    Dave Townsend presents for today for heart failure clinic evaluation.  The patient is typically seen by Claudio Jane MD.  Patient's primary cardiology provider is Cinthia Sharif NP    Last known EF 26-30%.   Last known hospitalization and/or ED visit: She was hospitalized from March 26 through March 28, 2024 with STEMI which time patient underwent left heart catheterization with PCI drug-eluting stent to the RCA and 2 stents to the posterolateral branch            05/15/2025 visit data/details regarding:   Dyspnea: Denies dyspnea on exertion  Lower extremity swelling: Improved swelling of lower extremities  Abdominal swelling: Denies  Home weight: Weight monitoring booklet provided during initial visit; Has scale.   Home BP: BP monitoring booklet provided during initial visit; Has BP cuff.   Home heart rate: HR monitoring booklet provided during initial visit.  Daily activities of living: Performing on his own  Pillows/lying flat: 3 pillows in bed   HF zone: Green  Accompanied by: Self  Patient is chest pain free. He reports he is without significant shortness of breath with exertion or swelling of his lower extremities.  He is tolerating current medication regimen well. He reports blood pressure is well controlled in the 100s-120s systolic.        Review of Systems - Review of Systems   Constitutional: Negative  for decreased appetite and diaphoresis.   HENT:  Negative for congestion and ear pain.    Eyes:  Negative for blurred vision, double vision and pain.   Cardiovascular:  Negative for chest pain, claudication and dyspnea on exertion.   Respiratory:  Negative for cough and shortness of breath.    Endocrine: Negative for cold intolerance and heat intolerance.   Hematologic/Lymphatic: Negative for adenopathy and bleeding problem.   Skin:  Negative for dry skin and nail changes.   Musculoskeletal:  Negative for arthritis and falls.   Gastrointestinal:  Negative for bloating and anorexia.   Genitourinary:  Negative for bladder incontinence and dysuria.   Neurological:  Negative for aphonia and difficulty with concentration.   Psychiatric/Behavioral:  Negative for altered mental status and hallucinations.          All other systems were reviewed and were negative.    Patient Active Problem List   Diagnosis    Essential hypertension    Mixed hyperlipidemia    Prediabetes    Smoker    Mixed type COPD (chronic obstructive pulmonary disease)    Healthcare maintenance    HFrEF (heart failure with reduced ejection fraction)    Coronary artery disease involving native coronary artery of native heart without angina pectoris    Iron deficiency anemia    Encounter for immunization       family history includes Emphysema in his father; Heart disease in his mother.     reports that he has been smoking cigarettes. He started smoking about 40 years ago. He has a 40.4 pack-year smoking history. He has been exposed to tobacco smoke. He has quit using smokeless tobacco.  His smokeless tobacco use included snuff. He reports that he does not drink alcohol and does not use drugs.    Allergies   Allergen Reactions    Atorvastatin Myalgia    Latex Rash         Current Outpatient Medications:     aspirin 81 MG EC tablet, Take 1 tablet by mouth Daily., Disp: 30 tablet, Rfl: 1    clopidogrel (PLAVIX) 75 MG tablet, Take 8 tablets by mouth for a  single dose 24 hours after your last dose of Brilinta.  The next day after loading dose, you will begin 1 tablet daily., Disp: 98 tablet, Rfl: 3    dapagliflozin Propanediol (Farxiga) 10 MG tablet, Take 1 tablet by mouth Daily, Disp: 90 tablet, Rfl: 1    metoprolol succinate XL (TOPROL-XL) 25 MG 24 hr tablet, Take 0.5 tablets by mouth Daily., Disp: 90 tablet, Rfl: 0    nitroglycerin (NITROSTAT) 0.4 MG SL tablet, Place 1 tablet under the tongue Every 5 minutes as Needed for Chest Pain (Systolic BP Greater Than 100). Take no more than 3 doses in 15 minutes., Disp: 25 tablet, Rfl: 0    rosuvastatin (CRESTOR) 20 MG tablet, Take 1 tablet by mouth Daily., Disp: 30 tablet, Rfl: 1    sacubitril-valsartan (Entresto) 49-51 MG tablet, Take 1 tablet by mouth 2 (Two) Times a Day., Disp: 180 tablet, Rfl: 3    spironolactone (ALDACTONE) 25 MG tablet, Take 0.5 tablet by mouth Daily., Disp: 45 tablet, Rfl: 1    Vericiguat (Verquvo) 10 MG tablet, Take 1 tablet by mouth Daily., Disp: 90 tablet, Rfl: 3    ferrous sulfate 325 (65 FE) MG tablet, Take 1 tablet by mouth 2 (Two) Times a Day. (Patient not taking: Reported on 5/15/2025), Disp: 60 tablet, Rfl: 2    vitamin C (ASCORBIC ACID) 500 MG tablet, Take 1 tablet by mouth with each dose of ferrous sulfate (Patient not taking: Reported on 5/15/2025), Disp: 60 tablet, Rfl: 2      Physical Exam:  I have reviewed the patient's current vital signs as documented in the patient's EMR.   Vitals:    05/15/25 1346   BP: 104/68   Pulse: 91   SpO2: 97%         Body mass index is 31.52 kg/m².       05/15/25  1346   Weight: 103 kg (226 lb)          Physical Exam  Vitals and nursing note reviewed.   Constitutional:       General: He is awake.      Appearance: Normal appearance. He is well-developed and well-groomed.   HENT:      Head: Normocephalic and atraumatic.   Eyes:      General: Lids are normal.      Conjunctiva/sclera:      Right eye: Right conjunctiva is not injected.      Left eye: Left  conjunctiva is not injected.   Cardiovascular:      Rate and Rhythm: Normal rate and regular rhythm.      Heart sounds:      No S3 or S4 sounds.   Pulmonary:      Effort: No tachypnea or bradypnea.      Breath sounds: No decreased breath sounds, wheezing, rhonchi or rales.   Abdominal:      General: Bowel sounds are normal.      Palpations: Abdomen is soft.      Tenderness: There is no abdominal tenderness.   Musculoskeletal:      Right lower leg: No edema.      Left lower leg: No edema.   Skin:     General: Skin is warm and dry.   Neurological:      Mental Status: He is alert and oriented to person, place, and time.   Psychiatric:         Attention and Perception: Attention normal.         Mood and Affect: Mood normal.         Behavior: Behavior is cooperative.          JVP: Volume/Pulsation: Normal.        DATA REVIEWED:     ---------------------------------------------------  TTE/ROSA:  Results for orders placed during the hospital encounter of 06/16/24    Adult Transthoracic Echo Limited W/ Cont if Necessary Per Protocol    Interpretation Summary    Left ventricular ejection fraction appears to be 26 - 30%.    The left ventricular cavity is borderline dilated.        LAST HEART CATH/IF AVAILABLE:     Results for orders placed during the hospital encounter of 03/26/24    Cardiac Catheterization/Vascular Study    Conclusion    Mid RCA-2 lesion is 60% stenosed.    Dist RCA lesion is 100% stenosed.    RPAV lesion is 95% stenosed.    1.  Cardiac.  Patient with acute coronary syndrome involving the right coronary artery with 100% stenosis prior to intervention.  PTCA and stent placement to the right coronary artery done.  Angioplasty and stent of the posterolateral branch done also      -----------------------------------------------------  CXR/Imaging:   Imaging Results (Most Recent)       None            I personally reviewed and interpreted the CXR.      -----------------------------------------------------  CT:  "  No radiology results for the last 30 days.  I personally reviewed the images of the CT scan.  My personal interpretation is below.      ----------------------------------------------------    --------------------------------------------------------------------------------------------------    Laboratory evaluations:    Lab Results   Component Value Date    GLUCOSE 107 (H) 05/15/2025    BUN 18 05/15/2025    CREATININE 1.05 05/15/2025    BCR 17.1 05/15/2025    K 4.0 05/15/2025    CO2 18.0 (L) 05/15/2025    CALCIUM 9.4 05/15/2025    ALBUMIN 3.9 03/03/2025    AST 21 03/03/2025    ALT 18 03/03/2025     Lab Results   Component Value Date    WBC 10.91 (H) 03/03/2025    HGB 14.6 03/03/2025    HCT 45.0 03/03/2025    MCV 84.4 03/03/2025     03/03/2025     Lab Results   Component Value Date    CHOL 156 03/03/2025    TRIG 82 03/03/2025    HDL 34 (L) 03/03/2025     (H) 03/03/2025     Lab Results   Component Value Date    TSH 6.010 (H) 06/09/2024     Lab Results   Component Value Date    HGBA1C 5.10 06/09/2024     Lab Results   Component Value Date    ALT 18 03/03/2025     Lab Results   Component Value Date    HGBA1C 5.10 06/09/2024    HGBA1C 5.70 (H) 03/27/2024     Lab Results   Component Value Date    CREATININE 1.05 05/15/2025     Lab Results   Component Value Date    IRON 25 (L) 03/03/2025    TIBC 447 03/03/2025    FERRITIN 64.30 03/03/2025     No results found for: \"INR\", \"PROTIME\"     Lab Results   Component Value Date    ABSOLUTELUNG 33 05/15/2025    ABSOLUTELUNG 33 11/14/2024    ABSOLUTELUNG 32 08/09/2024       PAH RISK ASSESSMENT:      1. HFrEF (heart failure with reduced ejection fraction)    2. Essential hypertension    3. Coronary artery disease involving native coronary artery of native heart without angina pectoris    4. Class 1 obesity              ORDERS PLACED TODAY:  Orders Placed This Encounter   Procedures    ReDs Vest    Basic Metabolic Panel    Magnesium    proBNP    Basic Metabolic Panel    " Magnesium    proBNP        Diagnoses and all orders for this visit:    1. HFrEF (heart failure with reduced ejection fraction) (Primary)  Overview:  Goals of care-GDMT to mitigate disease progression/exacerbation  03/26/2024-LVEF 26 to 30%  06/16/2024-LVEF 26 to 30%  10/7/2024-AICD implanted  12/30/2024-serum iron 32, TSAT 7    Orders:  -     Basic Metabolic Panel; Future  -     Magnesium; Future  -     proBNP; Future  -     Basic Metabolic Panel; Standing  -     Basic Metabolic Panel  -     Magnesium; Standing  -     Magnesium  -     proBNP; Standing  -     proBNP  -     ReDs Vest  -     sacubitril-valsartan (Entresto) 49-51 MG tablet; Take 1 tablet by mouth 2 (Two) Times a Day.  Dispense: 180 tablet; Refill: 3  -     dapagliflozin Propanediol (Farxiga) 10 MG tablet; Take 1 tablet by mouth Daily  Dispense: 90 tablet; Refill: 1    2. Essential hypertension  Overview:  Goals of care-maintain systolic blood pressure less than 130 and diastolic blood pressure less than 90      3. Coronary artery disease involving native coronary artery of native heart without angina pectoris  Overview:  Goals of care-aggressive risk factor modification and GDMT to mitigate disease progression  03/26/20245292-[id number]-[id number] of MICHAEL to RCA and posterolateral      4. Class 1 obesity    Other orders  -     nitroglycerin (NITROSTAT) 0.4 MG SL tablet; Place 1 tablet under the tongue Every 5 minutes as Needed for Chest Pain (Systolic BP Greater Than 100). Take no more than 3 doses in 15 minutes.  Dispense: 25 tablet; Refill: 0  -     Vericiguat (Verquvo) 10 MG tablet; Take 1 tablet by mouth Daily.  Dispense: 90 tablet; Refill: 3                 MEDS ORDERED TODAY:    New Medications Ordered This Visit   Medications    sacubitril-valsartan (Entresto) 49-51 MG tablet     Sig: Take 1 tablet by mouth 2 (Two) Times a Day.     Dispense:  180 tablet     Refill:  3    dapagliflozin Propanediol (Farxiga) 10 MG tablet     Sig: Take 1 tablet by mouth Daily      Dispense:  90 tablet     Refill:  1     Lot Number?:   [id number]     Expiration Date?:   10/31/2026     Quantity:   28    nitroglycerin (NITROSTAT) 0.4 MG SL tablet     Sig: Place 1 tablet under the tongue Every 5 minutes as Needed for Chest Pain (Systolic BP Greater Than 100). Take no more than 3 doses in 15 minutes.     Dispense:  25 tablet     Refill:  0    Vericiguat (Verquvo) 10 MG tablet     Sig: Take 1 tablet by mouth Daily.     Dispense:  90 tablet     Refill:  3        ---------------------------------------------------------------------------------------------------------------------------          ASSESSMENT/PLAN:      Diagnosis Plan   1. HFrEF (heart failure with reduced ejection fraction)  Basic Metabolic Panel    Magnesium    proBNP    Basic Metabolic Panel    Basic Metabolic Panel    Magnesium    Magnesium    proBNP    proBNP    ReDs Vest    sacubitril-valsartan (Entresto) 49-51 MG tablet    dapagliflozin Propanediol (Farxiga) 10 MG tablet      2. Essential hypertension        3. Coronary artery disease involving native coronary artery of native heart without angina pectoris        4. Class 1 obesity                not acutely decompensated chronic moderate systolic and diastolic heart failure. CHF. Etiology: Ischemic/CAD (Hx of AMI, CAD, or Ischemic Cardiomyopathy).     NYHA stage Stage C: Structural heart disease is present AND symptoms have occurredFC-Class II: Slight limitation of physical activity. Comfortable at rest Ordinary physical activity results in fatigue, palpitation, dyspnea (shortness of breath).     Today, Patient appears euvolemic.and with  Excellent perfusion. The patient's hemodynamics are currently acceptable. HR is: normal and is at goal. BP/MAP was reviewed and there is not room for medication up-titration.  Clinical trajectory was assessed and hasimproved.     CHF GOAL DIRECTED MEDICAL THERAPY FOR PATIENT ADDRESSED/ADJUSTED:     GDMT: HFrEF    Drug Class   Drug   Dose Last Dose  Adjustment Notes   ACEi/ARB/ARNI Entresto 49-51mg BID     Beta Blocker Toprol XL  12.5mg qd     MRA Spironolactone 12.5mg qd     SGLT2i Farxiga 10mg  N/A   Secondaries if applicable:  Verquevo 10mg       -CHF Specific BB:   Continue Metoprolol Succinate  at dose of 12.5mg qd.    -ACE/ARB/ARNi:   Current dose: Entresto 49-51mg BID  Continue current dose at present with BP tolerating well.     -MRA:   Spironolactone 12.5 mg on board; BMP checked on 05/15/25 and creatinine & potassium are within acceptable limits.      -SGLT2 inhibitor therapy:     Recommend continuing Farxiga (dapagliflozin) 10mg daily with quarterly assessment of GFR.  Pharmacy helped with assistance program for affordability.      -Diuretic regimen:   ReDS Vest reading for. 05/15/25 is 33; ReDs Vest reading reviewed with patient.    Not requiring loop. Continue MRA at present as outlined.   BMP, Mag, & ProBNP obtained and reviewed with Mr. Townsend.  Labs appear to be stable in comparison to prior values.     -Fluid restriction/Sodium restriction:   Requested 2000 ml restriction  Patient has been asked to weigh daily and was provided with a printed diuretic strategy.  1,500 mg Na restriction was discussed.      Secondary pharmacological therapy in HFreF:   EF is less than 45% @ 26-30%.   Maximized on GDMT as tolerated thus far including Toprol XL, Entresto, Farxiga, & Spironolactone  Continue Verquevo 10mg qd.         -Acute vs. Chronic underlying conditions other than HF addressed during visit:     ASCVD:   Continue f/u with IC.   Continue antiplatelets and statins per IC dosing.     Essential HTN:   BP well controlled on current antihyperteneseive regimen.     Identifiable barriers to Heart Failure Self-care:   Medical Barriers:  Multiple medical comorbidities  Social Barriers: Financial Durbin of HF treatment          BMI is >= 30 and <35. (Class 1 Obesity). The following options were offered after discussion;: Heart Failure dietary  measures                This document has been electronically signed by Julee Cruz PA-C  May 15, 2025 16:20 EDT      Dictated Utilizing Dragon Dictation: Part of this note may be an electronic transcription/translation of spoken language to printed text using the Dragon Dictation System.    Follow-up appointment and medication changes provided in hand delivered After Visit Summary as well as reviewed in the room.

## 2025-06-03 ENCOUNTER — OFFICE VISIT (OUTPATIENT)
Dept: FAMILY MEDICINE CLINIC | Facility: CLINIC | Age: 60
End: 2025-06-03
Payer: MEDICAID

## 2025-06-03 ENCOUNTER — DISEASE STATE MANAGEMENT VISIT (OUTPATIENT)
Dept: PHARMACY | Facility: HOSPITAL | Age: 60
End: 2025-06-03
Payer: MEDICAID

## 2025-06-03 ENCOUNTER — TELEPHONE (OUTPATIENT)
Dept: FAMILY MEDICINE CLINIC | Facility: CLINIC | Age: 60
End: 2025-06-03

## 2025-06-03 DIAGNOSIS — D50.9 IRON DEFICIENCY ANEMIA, UNSPECIFIED IRON DEFICIENCY ANEMIA TYPE: Chronic | ICD-10-CM

## 2025-06-03 DIAGNOSIS — R73.03 PREDIABETES: ICD-10-CM

## 2025-06-03 DIAGNOSIS — I25.10 CORONARY ARTERY DISEASE INVOLVING NATIVE CORONARY ARTERY OF NATIVE HEART WITHOUT ANGINA PECTORIS: Chronic | ICD-10-CM

## 2025-06-03 DIAGNOSIS — I50.20 HFREF (HEART FAILURE WITH REDUCED EJECTION FRACTION): Chronic | ICD-10-CM

## 2025-06-03 DIAGNOSIS — I10 ESSENTIAL HYPERTENSION: Chronic | ICD-10-CM

## 2025-06-03 DIAGNOSIS — E78.2 MIXED HYPERLIPIDEMIA: Primary | Chronic | ICD-10-CM

## 2025-06-03 DIAGNOSIS — J44.9 MIXED TYPE COPD (CHRONIC OBSTRUCTIVE PULMONARY DISEASE): ICD-10-CM

## 2025-06-03 DIAGNOSIS — Z00.00 HEALTHCARE MAINTENANCE: ICD-10-CM

## 2025-06-03 DIAGNOSIS — F17.200 SMOKER: ICD-10-CM

## 2025-06-03 DIAGNOSIS — Z23 ENCOUNTER FOR IMMUNIZATION: ICD-10-CM

## 2025-06-03 NOTE — PROGRESS NOTES
Subjective   Dave Towsnend is a 60 y.o. male.     Chief Complaint  He returns for a scheduled reassessment of multiple medical problems including coronary artery disease, heart failure with reduced ejection fraction, essential hypertension, hyperlipidemia, prediabetes, and iron deficiency anemia    History of Present Illness     Coronary Artery Disease  He underwent stenting x 2 of the RCA by Dr. Mendosa on 3/26/2024 after presenting with an inferior STEMI.  His coronary angiogram was otherwise unremarkable. He gives a history of previous MI treated in [place]. He remains short of breath with above average exertion, but continues to deny any chest pain, palpitations, lightheadedness, orthopnea, PND, calf pain, or swelling of the ankles.  He remains on dual antiplatelet therapy, but was recently changed from ticagrelor to clopidogrel.  He is scheduled to undergo a cardiology reassessment with MARITA Latham on 6/9/2025.  He continues to smoke 1-2 packs/day    HFrEF  An echocardiogram done at the time of his STEMI revealed mild to moderate left ventricular dilation with an estimated EF of 30%. He underwent an updated echocardiogram on 6/16/2024 with no significant changes. He underwent placement of an ICD by Dr. Esteban on 10/7/2024.  He admits to continued tenderness about the site.  He remains on metoprolol succinate, sacubitril-valsartan, spironolactone, dapagliflozin, and vericiguat.  He is scheduled to undergo an EP reassessment with Dr. James on 4/17/2026    Essential Hypertension  He gives a long history of hypertension.  He is currently on metoprolol succinate, valsartan, and spironolactone  Lab Results   Component Value Date    GLUCOSE 107 (H) 05/15/2025    BUN 18 05/15/2025    CREATININE 1.05 05/15/2025     05/15/2025    K 4.0 05/15/2025     05/15/2025    CALCIUM 9.4 05/15/2025    PROTEINTOT 7.5 03/03/2025    ALBUMIN 3.9 03/03/2025    ALT 18 03/03/2025    AST 21 03/03/2025    ALKPHOS 73  03/03/2025    BILITOT <0.2 03/03/2025    GLOB 3.6 03/03/2025    AGRATIO 1.1 03/03/2025    BCR 17.1 05/15/2025    ANIONGAP 13.0 05/15/2025    EGFR 82.8 05/15/2025     Hyperlipidemia  He remains on rosuvastatin with no apparent side effects   Lab Results   Component Value Date    CHOL 156 03/03/2025    TRIG 82 03/03/2025    HDL 34 (L) 03/03/2025     (H) 03/03/2025     Prediabetes  His A1c was elevated in hospital.  He states his glucose has never been high in the past.  He is currently on dapagliflozin for HFrEF    Iron Deficiency Anemia  He denies any bleeding whatsoever.  He is apparently being set up for IV iron through cardiology.  He was scheduled to undergo a GI assessment with Milly KIRKLAND on 2/10/2025 but missed this and has yet to reschedule  Lab Results   Component Value Date    WBC 10.91 (H) 03/03/2025    HGB 14.6 03/03/2025    HCT 45.0 03/03/2025    MCV 84.4 03/03/2025     03/03/2025     Lab Results   Component Value Date    IRON 25 (L) 03/03/2025    LABIRON 6 (L) 03/03/2025    TRANSFERRIN 300 03/03/2025    TIBC 447 03/03/2025    FERRITIN 64.30 03/03/2025     Smoker  He gives a 50-pack-year history and is currently averaging 2 pack/day.    The following portions of the patient's history were reviewed and updated as appropriate: allergies, current medications, past medical history, past social history, and problem list.    Review of Systems   Constitutional:  Positive for fatigue. Negative for chills and fever.   HENT:  Negative for congestion, ear pain, hearing loss, postnasal drip, rhinorrhea, sinus pressure, sneezing, sore throat and voice change.    Eyes:  Negative for visual disturbance.   Respiratory:  Positive for shortness of breath. Negative for cough and wheezing.    Cardiovascular:  Negative for chest pain, palpitations and leg swelling.   Gastrointestinal:  Negative for abdominal pain, blood in stool, constipation, diarrhea, nausea, vomiting and GERD.   Genitourinary:   Positive for nocturia (2-3). Negative for dysuria, frequency, hematuria and urgency.   Musculoskeletal:  Negative for arthralgias, back pain, joint swelling, myalgias and neck pain.   Skin:  Negative for rash.   Neurological:  Negative for weakness and numbness.   Psychiatric/Behavioral:  Positive for sleep disturbance. Negative for suicidal ideas and depressed mood. The patient is not nervous/anxious.      Objective   Physical Exam  Constitutional:       General: He is not in acute distress.     Appearance: Normal appearance. He is well-developed. He is not ill-appearing or diaphoretic.      Comments: Accompanied by his granddaughter.  Appeared a bit older than stated age.  Bright and in good spirits.  No apparent distress. No pallor, jaundice, diaphoresis, or cyanosis.     HENT:      Head: Atraumatic.      Right Ear: Tympanic membrane, ear canal and external ear normal.      Left Ear: Tympanic membrane, ear canal and external ear normal.      Mouth/Throat:      Lips: No lesions.      Mouth: Mucous membranes are moist. No oral lesions.      Dentition: Dental caries present.      Pharynx: No oropharyngeal exudate or posterior oropharyngeal erythema.   Eyes:      General: Lids are normal. Gaze aligned appropriately.      Extraocular Movements: Extraocular movements intact.      Conjunctiva/sclera: Conjunctivae normal.      Pupils: Pupils are equal.   Neck:      Thyroid: No thyroid mass or thyromegaly.      Vascular: No carotid bruit.      Trachea: Trachea normal. No tracheal deviation.   Cardiovascular:      Rate and Rhythm: Normal rate and regular rhythm.      Pulses:           Dorsalis pedis pulses are 2+ on the right side and 2+ on the left side.        Posterior tibial pulses are 2+ on the right side and 2+ on the left side.      Heart sounds: Normal heart sounds, S1 normal and S2 normal. No murmur heard.     No gallop.      Comments: No calf tenderness  Pulmonary:      Effort: Pulmonary effort is normal.       Breath sounds: Wheezing (diffuse - mild) present.      Comments: Pulmonary hyperinflation  Abdominal:      General: Bowel sounds are normal. There is no distension.   Musculoskeletal:      Right lower leg: No edema.      Left lower leg: No edema.   Lymphadenopathy:      Head:      Right side of head: No submental, submandibular, tonsillar, preauricular, posterior auricular or occipital adenopathy.      Left side of head: No submental, submandibular, tonsillar, preauricular, posterior auricular or occipital adenopathy.      Cervical: No cervical adenopathy.      Upper Body:      Right upper body: No supraclavicular adenopathy.      Left upper body: No supraclavicular adenopathy.   Skin:     General: Skin is warm and dry.      Coloration: Skin is not cyanotic, jaundiced or pale.      Findings: No rash.      Nails: There is no clubbing.   Neurological:      Mental Status: He is alert and oriented to person, place, and time.      Cranial Nerves: No dysarthria or facial asymmetry.      Sensory: No sensory deficit.      Motor: No tremor.      Coordination: Coordination normal.      Gait: Gait normal.   Psychiatric:         Attention and Perception: Attention normal.         Mood and Affect: Mood normal.         Speech: Speech normal.         Behavior: Behavior normal.         Thought Content: Thought content normal.       Assessment & Plan   Problems Addressed this Visit          Cardiac and Vasculature    Coronary artery disease involving native coronary artery of native heart without angina pectoris (Chronic)  Reminded regarding risk factor modification with an emphasis on tobacco cessation.  Continue dual antiplatelet therapy.  Follow up with cardiology     Essential hypertension (Chronic)   Hypertension: BP a bit low on current HFrEF medication. Evidence of target organ damage: coronary artery disease and heart failure.  Encouraged to continue to work on diet and exercise plan.   Continue current medication  Follow up  with cardiology     HFrEF (heart failure with reduced ejection fraction) (Chronic)  Congestive heart failure due to coronary artery disease (CAD) and systolic dysfunction.  Heart failure is stable.  Reminded regarding the importance of lifestyle modification.  Continue current medication  Follow up with cardiology     Mixed hyperlipidemia   As above.   Continue current medication.  Follow up with cardiology        Endocrine and Metabolic    Prediabetes  As above.  Will continue to monitor  Updated labs will be arranged at return.       Health Encounters    Healthcare maintenance  Tdap administered  Recommended a MMR and Shingrix  LDCT of the chest will be rescheduled    Relevant Orders    Tdap Vaccine Greater Than or Equal To 6yo IM (Completed)       Hematology and Neoplasia    Iron deficiency anemia (Chronic)  Will try again to arrange a GI workup    Relevant Orders    Ambulatory Referral to General Surgery       Infectious Diseases    Encounter for immunization    Relevant Orders    Tdap Vaccine Greater Than or Equal To 6yo IM (Completed)       Pulmonary and Pneumonias    Mixed type COPD (chronic obstructive pulmonary disease)   COPD is stable.  Reminded of the importance of smoking cessation  Encouraged to remain as active as symptoms allow for       Tobacco    Smoker     Diagnoses         Codes Comments      Mixed hyperlipidemia    -  Primary ICD-10-CM: E78.2  ICD-9-CM: 272.2       HFrEF (heart failure with reduced ejection fraction)     ICD-10-CM: I50.20  ICD-9-CM: 428.20       Essential hypertension     ICD-10-CM: I10  ICD-9-CM: 401.9       Coronary artery disease involving native coronary artery of native heart without angina pectoris     ICD-10-CM: I25.10  ICD-9-CM: 414.01       Prediabetes     ICD-10-CM: R73.03  ICD-9-CM: 790.29       Iron deficiency anemia, unspecified iron deficiency anemia type     ICD-10-CM: D50.9  ICD-9-CM: 280.9       Healthcare maintenance     ICD-10-CM: Z00.00  ICD-9-CM: V70.0        Mixed type COPD (chronic obstructive pulmonary disease)     ICD-10-CM: J44.9  ICD-9-CM: 496       Smoker     ICD-10-CM: F17.200  ICD-9-CM: 305.1       Encounter for immunization     ICD-10-CM: Z23  ICD-9-CM: V03.89

## 2025-06-03 NOTE — TELEPHONE ENCOUNTER
PATIENTS INSURANCE IS SHOWING [employer]. PATIENT STATES THAT HE CANCELLED THAT ONE AND NOW HAS [facility].  PATIENT DID STILL WANT TO BE SEEN TODAY, AND IS AWARE THAT IF HE DOES NOT GET IT STRAIGHTENED OUT THAT HE IS LIKELY TO RECEIVE A BILL.

## 2025-06-03 NOTE — TELEPHONE ENCOUNTER
Patient called insurance, states that it takes 2 weeks to fix. Coordination of benefits.     Ref # is [id number]

## 2025-06-04 ENCOUNTER — TELEPHONE (OUTPATIENT)
Dept: FAMILY MEDICINE CLINIC | Facility: CLINIC | Age: 60
End: 2025-06-04
Payer: MEDICAID

## 2025-06-04 VITALS
BODY MASS INDEX: 31.92 KG/M2 | SYSTOLIC BLOOD PRESSURE: 104 MMHG | DIASTOLIC BLOOD PRESSURE: 60 MMHG | HEART RATE: 99 BPM | TEMPERATURE: 98.7 F | OXYGEN SATURATION: 97 % | WEIGHT: 228 LBS | HEIGHT: 71 IN | RESPIRATION RATE: 15 BRPM

## 2025-06-04 NOTE — TELEPHONE ENCOUNTER
HUB TO READ/RELAY  Called and could not reach patient or leave a message as mailbox was full. Please make patient aware of following:    Patient is scheduled for LDCT on 6/9/25 @ 3:15 pm at the [facility] in [place].    Patient is rescheduled with Milly Montana at [facility] in [place] on 10/3/25 at 10:15 am. He has been placed on a wait list incase a sooner appointment comes up.

## 2025-06-04 NOTE — TELEPHONE ENCOUNTER
----- Message from Claudio Jane sent at 6/3/2025  3:55 PM EDT -----  If at all possible, please reschedule LDCT chest for tiffani on June 9 around 230 to 330  He also needs appt with harrison tang rescheduled sometime

## 2025-06-09 ENCOUNTER — LAB (OUTPATIENT)
Dept: LAB | Facility: HOSPITAL | Age: 60
End: 2025-06-09
Payer: MEDICAID

## 2025-06-09 ENCOUNTER — APPOINTMENT (OUTPATIENT)
Dept: CT IMAGING | Facility: HOSPITAL | Age: 60
End: 2025-06-09
Payer: MEDICAID

## 2025-06-09 ENCOUNTER — OFFICE VISIT (OUTPATIENT)
Dept: CARDIOLOGY | Facility: CLINIC | Age: 60
End: 2025-06-09
Payer: MEDICAID

## 2025-06-09 VITALS
OXYGEN SATURATION: 96 % | HEIGHT: 71 IN | WEIGHT: 226.4 LBS | SYSTOLIC BLOOD PRESSURE: 123 MMHG | BODY MASS INDEX: 31.69 KG/M2 | DIASTOLIC BLOOD PRESSURE: 72 MMHG | HEART RATE: 88 BPM

## 2025-06-09 DIAGNOSIS — E78.41 ELEVATED LIPOPROTEIN(A): ICD-10-CM

## 2025-06-09 DIAGNOSIS — E78.2 MIXED HYPERLIPIDEMIA: Chronic | ICD-10-CM

## 2025-06-09 DIAGNOSIS — F17.200 SMOKER: Chronic | ICD-10-CM

## 2025-06-09 DIAGNOSIS — I50.20 HFREF (HEART FAILURE WITH REDUCED EJECTION FRACTION): Chronic | ICD-10-CM

## 2025-06-09 DIAGNOSIS — I25.10 CORONARY ARTERY DISEASE INVOLVING NATIVE CORONARY ARTERY OF NATIVE HEART WITHOUT ANGINA PECTORIS: Primary | Chronic | ICD-10-CM

## 2025-06-09 DIAGNOSIS — D50.9 IRON DEFICIENCY ANEMIA, UNSPECIFIED IRON DEFICIENCY ANEMIA TYPE: Chronic | ICD-10-CM

## 2025-06-09 DIAGNOSIS — I10 ESSENTIAL HYPERTENSION: Chronic | ICD-10-CM

## 2025-06-09 DIAGNOSIS — D50.9 IRON DEFICIENCY ANEMIA, UNSPECIFIED IRON DEFICIENCY ANEMIA TYPE: Primary | Chronic | ICD-10-CM

## 2025-06-09 PROCEDURE — 36415 COLL VENOUS BLD VENIPUNCTURE: CPT

## 2025-06-09 PROCEDURE — 1160F RVW MEDS BY RX/DR IN RCRD: CPT | Performed by: NURSE PRACTITIONER

## 2025-06-09 PROCEDURE — 93000 ELECTROCARDIOGRAM COMPLETE: CPT | Performed by: NURSE PRACTITIONER

## 2025-06-09 PROCEDURE — 84466 ASSAY OF TRANSFERRIN: CPT

## 2025-06-09 PROCEDURE — 83540 ASSAY OF IRON: CPT

## 2025-06-09 PROCEDURE — 82728 ASSAY OF FERRITIN: CPT

## 2025-06-09 PROCEDURE — 3074F SYST BP LT 130 MM HG: CPT | Performed by: NURSE PRACTITIONER

## 2025-06-09 PROCEDURE — 85027 COMPLETE CBC AUTOMATED: CPT

## 2025-06-09 PROCEDURE — 99214 OFFICE O/P EST MOD 30 MIN: CPT | Performed by: NURSE PRACTITIONER

## 2025-06-09 PROCEDURE — 3078F DIAST BP <80 MM HG: CPT | Performed by: NURSE PRACTITIONER

## 2025-06-09 PROCEDURE — 1159F MED LIST DOCD IN RCRD: CPT | Performed by: NURSE PRACTITIONER

## 2025-06-09 NOTE — ASSESSMENT & PLAN NOTE
HF Classification: Heart Failure with reduced Ejection Fraction (40% or lower)    NYHA Class:  Class I: No limitation of physical activity. Ordinary physical activity does not cause undue fatigue, palpitation, dyspnea (shortness of breath)    HF Plan of Care:  GDMT Beta Blocker, SGLT2 Inhibitor, ARNI/ACE/ARB, and MRA; Additional Recommendations Iron studies ordered with plan for IV Injectafer infusion if TSAT less than 20

## 2025-06-09 NOTE — PROGRESS NOTES
Chief Complaint  Follow-up (Routine - Patient states he has been feeling good. Patient states his legs will get numb if he is standing for prolonged period of time. Patient states other than that he is well. Patient denies any chest pains, palpitations, shortness of breath, or leg swelling. )    Subjective          Dave Townsend presents to [facility] for follow up.    History of Present Illness    Mr. Townsend presents for follow-up of coronary artery disease, HFrEF, hypertension, and hyperlipidemia.  His last visit to clinic was 03/09/2025 with me.  At that time he was found to be iron deficient and infusions were ordered.  He was also found to have elevated LP(a) and so he was referred to the lipid clinic for PCSK9 inhibitor therapy.  Lastly his Entresto dose was increased due to uncontrolled blood pressure and he was transitioned from Brilinta to Plavix due to cost issues.    He has since seen Dr. Esteban for follow-up of AICD placement.  He also follows with Julee Cruz PA-C in the heart failure clinic.    Repatha is approved by his insurance without any prior authorization, however there is concern of a possible latex allergy.  History of Present Illness  He reports a recurrence of numbness in his legs, particularly when standing for extended periods. He has a known history of back issues but does not experience any pain during ambulation. The numbness subsides after a few minutes of standing, allowing him to regain mobility.    He was approved for Repatha injections but has not yet started them.  I discussed this with the pharmacist in the lipid clinic and a new referral will be placed.      He is currently on Plavix, which he tolerates well. He has recently refilled his Entresto prescription and does not require a refill at this time.    He has received an iron infusion and has not had his iron levels reevaluated since.  Will do that today.    He is making efforts to quit  "smoking and has been successful in reducing his intake.    SOCIAL HISTORY  Diet: Eats healthy.  Tobacco: Working on quitting smoking and making progress.  Coffee/Tea/Caffeine-containing Drinks: Drinks coffee.       Tobacco Use: High Risk (6/9/2025)    Patient History     Smoking Tobacco Use: Every Day     Smokeless Tobacco Use: Former     Passive Exposure: Current       Objective     Vital Signs:   /72 (BP Location: Left arm, Patient Position: Sitting, Cuff Size: Adult)   Pulse 88   Ht 180.3 cm (71\")   Wt 103 kg (226 lb 6.4 oz)   SpO2 96%   BMI 31.58 kg/m²       Physical Exam  Vitals and nursing note reviewed.   Constitutional:       General: He is not in acute distress.     Appearance: He is obese.      Comments: Smells of cigarette smoke   HENT:      Head: Normocephalic and atraumatic.   Eyes:      Conjunctiva/sclera: Conjunctivae normal.   Neck:      Vascular: No carotid bruit.   Cardiovascular:      Rate and Rhythm: Normal rate and regular rhythm.      Pulses: Normal pulses.   Pulmonary:      Effort: Pulmonary effort is normal.      Breath sounds: Normal breath sounds.   Musculoskeletal:      Cervical back: Neck supple.      Right lower leg: No edema.      Left lower leg: No edema.   Skin:     General: Skin is warm and dry.   Neurological:      General: No focal deficit present.   Psychiatric:         Mood and Affect: Mood normal.         Behavior: Behavior normal.             Result Review :            The following data was reviewed by me 06/09/25 at 10:38 EDT: cardiac and lab studies as detailed below.    WBC   Date Value Ref Range Status   03/03/2025 10.91 (H) 3.40 - 10.80 10*3/mm3 Final     Hemoglobin   Date Value Ref Range Status   03/03/2025 14.6 13.0 - 17.7 g/dL Final     Hematocrit   Date Value Ref Range Status   03/03/2025 45.0 37.5 - 51.0 % Final     MCV   Date Value Ref Range Status   03/03/2025 84.4 79.0 - 97.0 fL Final     MCH   Date Value Ref Range Status   03/03/2025 27.4 26.6 - 33.0 " pg Final     Platelets   Date Value Ref Range Status   03/03/2025 217 140 - 450 10*3/mm3 Final     Glucose   Date Value Ref Range Status   05/15/2025 107 (H) 65 - 99 mg/dL Final     BUN   Date Value Ref Range Status   05/15/2025 18 6 - 20 mg/dL Final     Creatinine   Date Value Ref Range Status   05/15/2025 1.05 0.76 - 1.27 mg/dL Final     Sodium   Date Value Ref Range Status   05/15/2025 136 136 - 145 mmol/L Final     Potassium   Date Value Ref Range Status   05/15/2025 4.0 3.5 - 5.2 mmol/L Final     Chloride   Date Value Ref Range Status   05/15/2025 105 98 - 107 mmol/L Final     CO2   Date Value Ref Range Status   05/15/2025 18.0 (L) 22.0 - 29.0 mmol/L Final     Calcium   Date Value Ref Range Status   05/15/2025 9.4 8.6 - 10.5 mg/dL Final     Total Protein   Date Value Ref Range Status   03/03/2025 7.5 6.0 - 8.5 g/dL Final     Albumin   Date Value Ref Range Status   03/03/2025 3.9 3.5 - 5.2 g/dL Final     ALT (SGPT)   Date Value Ref Range Status   03/03/2025 18 1 - 41 U/L Final     AST (SGOT)   Date Value Ref Range Status   03/03/2025 21 1 - 40 U/L Final     Alkaline Phosphatase   Date Value Ref Range Status   03/03/2025 73 39 - 117 U/L Final     Total Bilirubin   Date Value Ref Range Status   03/03/2025 <0.2 0.0 - 1.2 mg/dL Final     Anion Gap   Date Value Ref Range Status   05/15/2025 13.0 5.0 - 15.0 mmol/L Final     eGFR   Date Value Ref Range Status   05/15/2025 82.8 >60.0 mL/min/1.73 Final     Total Cholesterol   Date Value Ref Range Status   03/03/2025 156 0 - 200 mg/dL Final     HDL Cholesterol   Date Value Ref Range Status   03/03/2025 34 (L) 40 - 60 mg/dL Final     LDL Cholesterol    Date Value Ref Range Status   03/03/2025 106 (H) 0 - 100 mg/dL Final     LDL/HDL Ratio   Date Value Ref Range Status   03/03/2025 3.11  Final     Lipoprotein (a)   Date Value Ref Range Status   03/03/2025 218.0 (H) <75.0 nmol/L Final     Comment:     Note:  Values greater than or equal to 75.0 nmol/L may         indicate an  independent risk factor for CHD,         but must be evaluated with caution when applied         to non- populations due to the         influence of genetic factors on Lp(a) across         ethnicities.     Hemoglobin A1C   Date Value Ref Range Status   06/09/2024 5.10 4.80 - 5.60 % Final   03/27/2024 5.70 (H) 4.80 - 5.60 % Final     Magnesium   Date Value Ref Range Status   05/15/2025 2.0 1.6 - 2.6 mg/dL Final     03/03/2025    Last Cardiac Cath  Results for orders placed during the hospital encounter of 03/26/24    Cardiac Catheterization/Vascular Study    Conclusion    Mid RCA-2 lesion is 60% stenosed.    Dist RCA lesion is 100% stenosed.    RPAV lesion is 95% stenosed.    1.  Cardiac.  Patient with acute coronary syndrome involving the right coronary artery with 100% stenosis prior to intervention.  PTCA and stent placement to the right coronary artery done.  Angioplasty and stent of the posterolateral branch done also      Last Coronary CTA      Last Stress test       Last Echo  Results for orders placed during the hospital encounter of 06/16/24    Adult Transthoracic Echo Limited W/ Cont if Necessary Per Protocol    Interpretation Summary    Left ventricular ejection fraction appears to be 26 - 30%.    The left ventricular cavity is borderline dilated.         ECG 12 Lead    Date/Time: 6/9/2025 3:37 PM  Performed by: Lashonda Sharif APRN    Authorized by: Lashonda Sharif APRN  Comparison: compared with previous ECG from 12/30/2024  Similar to previous ECG  Comparison to previous ECG: Sinus rhythm with PVCs Q-wave in lead II, lead III, aVF, and V6  Rhythm: sinus rhythm  Rate: normal  BPM: 75  Q waves: II, III, aVF, V1, V3, V4, V5 and V6    T inversion: V6  QRS axis: left  Other findings: poor R wave progression    Clinical impression: abnormal EKG           Current Outpatient Medications   Medication Sig Dispense Refill    aspirin 81 MG EC tablet Take 1 tablet by mouth Daily. 30 tablet 1     clopidogrel (PLAVIX) 75 MG tablet Take 8 tablets by mouth for a single dose 24 hours after your last dose of Brilinta.  The next day after loading dose, you will begin 1 tablet daily. 98 tablet 3    dapagliflozin Propanediol (Farxiga) 10 MG tablet Take 1 tablet by mouth Daily 90 tablet 1    metoprolol succinate XL (TOPROL-XL) 25 MG 24 hr tablet Take 0.5 tablets by mouth Daily. 90 tablet 0    nitroglycerin (NITROSTAT) 0.4 MG SL tablet Place 1 tablet under the tongue Every 5 minutes as Needed for Chest Pain (Systolic BP Greater Than 100). Take no more than 3 doses in 15 minutes. 25 tablet 0    rosuvastatin (CRESTOR) 20 MG tablet Take 1 tablet by mouth Daily. 30 tablet 1    sacubitril-valsartan (Entresto) 49-51 MG tablet Take 1 tablet by mouth 2 (Two) Times a Day. 180 tablet 3    spironolactone (ALDACTONE) 25 MG tablet Take 0.5 tablet by mouth Daily. 45 tablet 1    Vericiguat (Verquvo) 10 MG tablet Take 1 tablet by mouth Daily. 90 tablet 3     No current facility-administered medications for this visit.            Assessment and Plan    Diagnoses and all orders for this visit:    1. Coronary artery disease involving native coronary artery of native heart without angina pectoris (Primary)  Assessment & Plan:  Coronary Artery Disease: Coronary artery disease is improving with treatment.  Goals of Care:Medication tolerance and compliance, control progression of disease, and Lifestyle modification:  tobacco use cessation  Plan: Continue current treatment regimen. Stop smoking.  Cardiac status will be reassessed in 6 months.      Orders:  -     Ambulatory Referral to Disease State Management  -     ECG 12 Lead    2. Mixed hyperlipidemia  Assessment & Plan:   He did not initiate Repatha therapy.  New referral sent to the lipid clinic    Orders:  -     Ambulatory Referral to Disease State Management    3. Elevated lipoprotein(a)  -     Ambulatory Referral to Disease State Management    4. Essential hypertension  Assessment &  Plan:  Hypertension is Controlled  Goals of Care:Medication compliance and tolerance, Systolic blood pressure <130, and Diastolic blood pressure  <80  Plan:  Continue current medications  Follow up:in 6 months        5. HFrEF (heart failure with reduced ejection fraction)  Assessment & Plan:  HF Classification: Heart Failure with reduced Ejection Fraction (40% or lower)    NYHA Class:  Class I: No limitation of physical activity. Ordinary physical activity does not cause undue fatigue, palpitation, dyspnea (shortness of breath)    HF Plan of Care:  GDMT Beta Blocker, SGLT2 Inhibitor, ARNI/ACE/ARB, and MRA; Additional Recommendations Iron studies ordered with plan for IV Injectafer infusion if TSAT less than 20          6. Iron deficiency anemia, unspecified iron deficiency anemia type  Assessment & Plan:  Received infusion - due for recheck      7. Smoker  Assessment & Plan:  Strongly recommend total cessation        Assessment & Plan  1. Cholesterol management:  - Repatha approved for cholesterol management  - Pharmacist confirmed minor reaction at injection site possible  - Referral placed; patient will be contacted    2. Blood pressure management:  - Blood pressure readings within normal range  - Increased dosage of Entresto likely effective  - No refill needed at this time    3. Iron deficiency:  - Previous iron infusion received  - Laboratory tests to be conducted today to assess current iron levels  - Additional iron infusion will be ordered if iron levels are low    4. Latex allergy:  - Latex causes skin breakouts  - Pharmacist confirmed only the cap of cholesterol injection contains latex, which should not contact skin    5. Leg numbness:  - Numbness in legs when standing for extended periods, possibly related to back condition  - Further evaluation may be needed if symptoms persist    Risks, benefits, and alternatives of treatment were discussed, including the potential minor reaction at the injection site  for Repatha and the necessity of monitoring iron levels post-infusion. The patient expressed willingness to proceed with the cholesterol injections despite the potential for minor site reactions.    Follow-up:  - Follow-up appointment scheduled in 6 months  - Patient to check out and go downstairs for labs      Follow Up     Return in about 6 months (around 12/9/2025).    Patient was given instructions and counseling regarding his condition or for health maintenance advice. Please see specific information pulled into the AVS if appropriate.       Electronically signed by MARITA Juarez, 06/09/25, 3:45 PM EDT.    Patient or patient representative verbalized consent for the use of Ambient Listening during the visit with  MARITA Juarez for chart documentation. 6/9/2025  15:45 EDT     Dictated Utilizing Dragon Dictation: Part of this note may be an electronic transcription/translation of spoken language to printed text using the Dragon Dictation System

## 2025-06-09 NOTE — ASSESSMENT & PLAN NOTE
Coronary Artery Disease: Coronary artery disease is improving with treatment.  Goals of Care:Medication tolerance and compliance, control progression of disease, and Lifestyle modification:  tobacco use cessation  Plan: Continue current treatment regimen. Stop smoking.  Cardiac status will be reassessed in 6 months.

## 2025-06-10 LAB
DEPRECATED RDW RBC AUTO: 48.4 FL (ref 37–54)
ERYTHROCYTE [DISTWIDTH] IN BLOOD BY AUTOMATED COUNT: 16.4 % (ref 12.3–15.4)
FERRITIN SERPL-MCNC: 20.5 NG/ML (ref 30–400)
HCT VFR BLD AUTO: 41.9 % (ref 37.5–51)
HGB BLD-MCNC: 13.2 G/DL (ref 13–17.7)
IRON 24H UR-MRATE: 29 MCG/DL (ref 59–158)
IRON SATN MFR SERPL: 6 % (ref 20–50)
MCH RBC QN AUTO: 26.1 PG (ref 26.6–33)
MCHC RBC AUTO-ENTMCNC: 31.5 G/DL (ref 31.5–35.7)
MCV RBC AUTO: 83 FL (ref 79–97)
PLATELET # BLD AUTO: 213 10*3/MM3 (ref 140–450)
PMV BLD AUTO: 11.4 FL (ref 6–12)
RBC # BLD AUTO: 5.05 10*6/MM3 (ref 4.14–5.8)
TIBC SERPL-MCNC: 511 MCG/DL (ref 298–536)
TRANSFERRIN SERPL-MCNC: 343 MG/DL (ref 200–360)
WBC NRBC COR # BLD AUTO: 6.91 10*3/MM3 (ref 3.4–10.8)

## 2025-06-16 ENCOUNTER — TELEPHONE (OUTPATIENT)
Dept: FAMILY MEDICINE CLINIC | Facility: CLINIC | Age: 60
End: 2025-06-16
Payer: COMMERCIAL

## 2025-06-16 NOTE — TELEPHONE ENCOUNTER
HUB TO READ/RELAY  Our office has not been able to reach patient. Patient was rescheduled for his LDCT on 6/9 and that appointment was again no showed. Patient will need to contact scheduling in order to make another appointment.   Patient also has referral to general surgery for colon screening that has not been scheduled as no one can reach him

## 2025-06-17 ENCOUNTER — SPECIALTY PHARMACY (OUTPATIENT)
Dept: PHARMACY | Facility: HOSPITAL | Age: 60
End: 2025-06-17
Payer: MEDICAID

## 2025-06-17 ENCOUNTER — SPECIALTY PHARMACY (OUTPATIENT)
Dept: PHARMACY | Facility: HOSPITAL | Age: 60
End: 2025-06-17
Payer: COMMERCIAL

## 2025-06-17 DIAGNOSIS — E78.2 MIXED HYPERLIPIDEMIA: Primary | Chronic | ICD-10-CM

## 2025-06-17 RX ORDER — ALIROCUMAB 75 MG/ML
75 INJECTION, SOLUTION SUBCUTANEOUS
Qty: 6 ML | Refills: 2 | Status: SHIPPED | OUTPATIENT
Start: 2025-06-17

## 2025-06-17 NOTE — PROGRESS NOTES
[facility]ication Management Clinic/ Specialty Pharmacy Patient Management Program  Lipid Management Program - PCSK9i Initial Assessment     Dave Townsend is a 60 y.o. male referred by their provider, Lashonda Sharif, to the Hyperlipidemia Patient Management program offered by [facility] for Lipid Management.  An initial outreach was conducted, including assessment of therapy appropriateness and specialty medication education for Praluent The patient was introduced to services offered by [facility], including: regular assessments, refill coordination, curbside pick-up or mail order delivery options, prior authorization maintenance, and financial assistance programs as applicable. The patient was also provided with contact information for the pharmacy team.     Dave Townsend is treated for clinical ASCVD and hyperlipidemia and currently takes Rosuvastatin.  In the past, Pt has tried Atorvastatin but was unable to tolerate due to muscle pain. The patient reports any allergies to latex.        Initial Start Date of PCSK9i: 6/17/2025  Initial LDL: 103 on 3/3/2025    Insurance Coverage & Financial Support  [place] Medicaid     Relevant Past Medical History and Comorbidities  Relevant medical history and concomitant health conditions were discussed with the patient. The patient's chart has been reviewed for relevant past medical history and comorbid conditions and updated as necessary.  Past Medical History:   Diagnosis Date    Abnormal ECG     Hypertension     STEMI (ST elevation myocardial infarction) 03/26/2024     Social History     Socioeconomic History    Marital status: Single   Tobacco Use    Smoking status: Every Day     Current packs/day: 1.00     Average packs/day: 1 pack/day for 40.5 years (40.5 ttl pk-yrs)     Types: Cigarettes     Start date: 1985     Passive exposure: Current    Smokeless tobacco: Former     Types: Snuff   Vaping Use     Vaping status: Never Used   Substance and Sexual Activity    Alcohol use: Never    Drug use: Never    Sexual activity: Defer            Allergies  Known allergies and reactions were discussed with the patient. The patient's chart has been reviewed for  allergy information and updated as necessary.   Allergies   Allergen Reactions    Atorvastatin Myalgia    Latex Rash            Relevant Laboratory Values  Relevant laboratory values were discussed with the patient. The following specialty medication dose adjustment(s) are recommended: See plan, if applicable   Lab Results   Component Value Date    CHOL 156 03/03/2025    TRIG 82 03/03/2025    HDL 34 (L) 03/03/2025     (H) 03/03/2025       Current Medication List  This medication list has been reviewed with the patient and evaluated for any interactions or necessary modifications/recommendations, and updated to include all prescription medications, OTC medications, and supplements the patient is currently taking.  This list reflects what is contained in the patient's profile, which has also been marked as reviewed to communicate to other providers it is the most up to date version of the patient's current medication therapy.     Current Outpatient Medications:     Alirocumab (Praluent) 75 MG/ML solution auto-injector, Inject 1 mL under the skin into the appropriate area as directed Every 14 (Fourteen) Days., Disp: 6 mL, Rfl: 2    aspirin 81 MG EC tablet, Take 1 tablet by mouth Daily., Disp: 30 tablet, Rfl: 1    dapagliflozin Propanediol (Farxiga) 10 MG tablet, Take 1 tablet by mouth Daily, Disp: 90 tablet, Rfl: 1    metoprolol succinate XL (TOPROL-XL) 25 MG 24 hr tablet, Take 0.5 tablets by mouth Daily., Disp: 90 tablet, Rfl: 0    nitroglycerin (NITROSTAT) 0.4 MG SL tablet, Place 1 tablet under the tongue Every 5 minutes as Needed for Chest Pain (Systolic BP Greater Than 100). Take no more than 3 doses in 15 minutes., Disp: 25 tablet, Rfl: 0    rosuvastatin  (CRESTOR) 20 MG tablet, Take 1 tablet by mouth Daily., Disp: 30 tablet, Rfl: 1    sacubitril-valsartan (Entresto) 49-51 MG tablet, Take 1 tablet by mouth 2 (Two) Times a Day., Disp: 180 tablet, Rfl: 3    spironolactone (ALDACTONE) 25 MG tablet, Take 0.5 tablet by mouth Daily., Disp: 45 tablet, Rfl: 1    Vericiguat (Verquvo) 10 MG tablet, Take 1 tablet by mouth Daily., Disp: 90 tablet, Rfl: 3         Drug Interactions  No significant drug-drug interactions with Praluent according to literature    Adherence and Self-Administration  Adherence related to the patient's specialty therapy was discussed with the patient. The Adherence segment of this outreach has been reviewed and updated.              Methods for Supporting Patient Adherence and/or Self-Administration: see plan, if applicable     Open Medication Therapy Problems  No medication therapy recommendations to display    Goals of Therapy  Goals related to the patient's specialty therapy were discussed with the patient. The Patient Goals segment of this outreach has been reviewed and updated.   Goals Addressed Today    None         Medication Assessment & Plan  Medication Therapy Changes: Patient started today on Praluent 75mg subcutaneous every 14 days .   Injection training and medication education provided.   I administered in RIGHT arm   Welcome information and patient satisfaction survey to be sent by specialty pharmacy team with patient's initial fill.  Related Plans, Therapy Recommendations, or Therapy Problems to Be Addressed: None  Patient will need lipid panel in 6 weeks, order placed. Patient given lab order.   Patient will continue regular follow-up with cardiology. Next follow-up on 12/15/25. Had recent follow-up on 6/9/25.  Patient will follow up with specialty pharmacy  services. Care Coordinator to set up future refill outreaches, coordinate prescription delivery, and escalate clinical questions to pharmacist.  Pharmacist to perform regular  assessments no more than (6) months from the previous assessment. Will follow-up in 6 months, or sooner if needed.    Initial Education Provided for Specialty Medication  The patient has been provided with the following education and any applicable administration techniques (i.e. self-injection) have been demonstrated for the therapies indicated. All questions and concerns have been addressed prior to the patient receiving the medication, and the patient has verbalized comprehension of the education and any materials provided. Additional patient education shall be provided and documented upon request by the patient, provider, or payer.    PRALUENT® (alirocumab)  Medication Expectations   Why am I taking this medication? You are taking Praluent to lower your “bad” cholesterol (LDL-C). This medication can be used in adults with high blood cholesterol including primary hyperlipidemia and familial hypercholesterolemia. It can help reduce the risk of heart attack and stroke.      What should I expect while on this medication? You should expect to see your cholesterol improve over time. Specifically, you should see your LDL-C decrease.    How does the medication work? Praluent works by blocking a protein called PCSK9 that contributes to high levels of bad cholesterol and it helps increase your liver's ability to remove bad cholesterol from your blood.     How long will I be on this medication for? The amount of time you will be on this medication will be determined by your doctor based on your cholesterol and/or your risk of having a cardiac event. You will most likely be on this medication or another cholesterol medication throughout your lifetime. Do not abruptly stop this medication without talking to your doctor first.    How do I take this medication? Take as directed on your prescription label. Praluent is injected under the skin (subcutaneously) of your stomach, thigh, or upper arm. This medication is usually given  one or twice a month.     What are some possible side effects? The most common side effects of Praluent include redness, itching, swelling, or pain/tenderness at the injection site, symptoms of the common cold, and flu or flu-like symptoms. Praluent can also cause diarrhea and muscle spasms.    What happens if I miss a dose? If you miss a dose, take it as soon as you remember if it is within 7 days from the usual day of administration then resume your original schedule. If it is beyond 7 days, skip the missed dose and resume your normal dosing schedule.      Medication Safety   What are things I should warn my doctor immediately about? Talk to your doctor if you are pregnant, planning to become pregnant, or breastfeeding. Stop the medication and tell your doctor or seek emergency medical help if you notice any signs/symptoms of an allergic reaction (severe rash, redness, hives, severe itching, trouble breathing, or swelling of the face, lips, or tongue). Do not take Praluent if you have an allergy to alirocumab or any of the ingredients in Praluent.    What are things that I should be cautious of? Be cautious of any side effects from this medication. Talk to your doctor if any new ones develop or aren't getting better.   What are some medications that can interact with this one? There are no known significant drug interactions with Praluent. Always tell your doctor or pharmacist immediately if you start taking any new medications, including over-the-counter medications, vitamins, and herbal supplements.      Medication Storage/Handling   How should I handle this medication? Do not shake or expose the pen to extreme heat or direct sunlight. Keep this medication out of reach of pets/children.    How does this medication need to be stored? Store unused pens in the refrigerator in the original carton to protect from light. Allow medication to warm at room temperature prior to administration. If needed, Praluent may be  kept at room temperature in the original carton for up to 30 days. Do not freeze.    How should I dispose of this medication? The device is a single-dose disposable pen and should be discarded in a sharps container after use. The blue caps should also be placed in a sharps container. If you do not own a sharps container, you may use a household container made of heavy-duty plastic with a tight-fitting lid that is leak resistant (e.g., heavty-duty plastic laundry detergent bottle).  If your doctor decides to stop this medication, take to your local police station for proper disposal. Some pharmacies also have take-back bins for medication drop-off.      Resources/Support   How can I remind myself to take this medication? You can download reminder apps to help you manage your refills. You may also set an alarm on your phone to remind you to take your dose.    Is financial support available?  [employer] can provide co-pay cards if you have commercial insurance or patient assistance if you have Medicare or no insurance.    Which vaccines are recommended for me? Talk to your doctor about these vaccines: Flu, Coronavirus (COVID-19), Pneumococcal (pneumonia), Tdap, Hepatitis B, Zoster (shingles)            Attestation      I attest the patient was actively involved in and has agreed to the above plan of care. If the prescribed therapy is at any point deemed not appropriate based on the current or future assessments, a consultation will be initiated with the patient's specialty care provider to determine the best course of action. The revised plan of therapy will be documented along with any required assessments and/or additional patient education provided.     Anneliese Dale RPH  6/17/2025  16:34 EDT

## 2025-06-17 NOTE — PROGRESS NOTES
Medication Management Clinic/ Specialty Pharmacy Patient Management Program  Lipid Management Program - PCSK9i Initial Assessment     Dave Townsend is a 60 y.o. male referred by their provider, Lashonda Sharif, to the Hyperlipidemia Patient Management program offered by [facility] for Lipid Management.  An initial outreach was conducted, including assessment of therapy appropriateness and specialty medication education for Praluent The patient was introduced to services offered by [facility], including: regular assessments, refill coordination, curbside pick-up or mail order delivery options, prior authorization maintenance, and financial assistance programs as applicable. The patient was also provided with contact information for the pharmacy team.     Dave Townsend is treated for clinical ASCVD and hyperlipidemia and currently takes Rosuvastatin.  In the past, Pt has tried Atorvastatin but was unable to tolerate due to muscle pain. The patient reports any allergies to latex.        Initial Start Date of PCSK9i: 6/17/2025  Initial LDL: 103 on 3/3/2025    Insurance Coverage & Financial Support  [place] Medicaid     Relevant Past Medical History and Comorbidities  Relevant medical history and concomitant health conditions were discussed with the patient. The patient's chart has been reviewed for relevant past medical history and comorbid conditions and updated as necessary.  Past Medical History:   Diagnosis Date    Abnormal ECG     Hypertension     STEMI (ST elevation myocardial infarction) 03/26/2024     Social History     Socioeconomic History    Marital status: Single   Tobacco Use    Smoking status: Every Day     Current packs/day: 1.00     Average packs/day: 1 pack/day for 40.5 years (40.5 ttl pk-yrs)     Types: Cigarettes     Start date: 1985     Passive exposure: Current    Smokeless tobacco: Former     Types: Snuff   Vaping Use     Vaping status: Never Used   Substance and Sexual Activity    Alcohol use: Never    Drug use: Never    Sexual activity: Defer       Problem list reviewed by Anneliese Dale RPH on 6/17/2025 at  2:23 PM    Allergies  Known allergies and reactions were discussed with the patient. The patient's chart has been reviewed for  allergy information and updated as necessary.   Allergies   Allergen Reactions    Atorvastatin Myalgia    Latex Rash       Allergies reviewed by Anneliese Dale RPH on 6/17/2025 at  2:13 PM  Allergies reviewed by Anneliese Dale RPH on 6/17/2025 at  2:30 PM    Relevant Laboratory Values  Relevant laboratory values were discussed with the patient. The following specialty medication dose adjustment(s) are recommended: See plan, if applicable   Lab Results   Component Value Date    CHOL 156 03/03/2025    TRIG 82 03/03/2025    HDL 34 (L) 03/03/2025     (H) 03/03/2025       Current Medication List  This medication list has been reviewed with the patient and evaluated for any interactions or necessary modifications/recommendations, and updated to include all prescription medications, OTC medications, and supplements the patient is currently taking.  This list reflects what is contained in the patient's profile, which has also been marked as reviewed to communicate to other providers it is the most up to date version of the patient's current medication therapy.     Current Outpatient Medications:     aspirin 81 MG EC tablet, Take 1 tablet by mouth Daily., Disp: 30 tablet, Rfl: 1    metoprolol succinate XL (TOPROL-XL) 25 MG 24 hr tablet, Take 0.5 tablets by mouth Daily., Disp: 90 tablet, Rfl: 0    nitroglycerin (NITROSTAT) 0.4 MG SL tablet, Place 1 tablet under the tongue Every 5 minutes as Needed for Chest Pain (Systolic BP Greater Than 100). Take no more than 3 doses in 15 minutes., Disp: 25 tablet, Rfl: 0    rosuvastatin (CRESTOR) 20 MG tablet, Take 1 tablet by mouth Daily., Disp: 30 tablet, Rfl:  1    sacubitril-valsartan (Entresto) 49-51 MG tablet, Take 1 tablet by mouth 2 (Two) Times a Day., Disp: 180 tablet, Rfl: 3    spironolactone (ALDACTONE) 25 MG tablet, Take 0.5 tablet by mouth Daily., Disp: 45 tablet, Rfl: 1    Vericiguat (Verquvo) 10 MG tablet, Take 1 tablet by mouth Daily., Disp: 90 tablet, Rfl: 3    Alirocumab (Praluent) 75 MG/ML solution auto-injector, Inject 1 mL under the skin into the appropriate area as directed Every 14 (Fourteen) Days., Disp: 6 mL, Rfl: 2    dapagliflozin Propanediol (Farxiga) 10 MG tablet, Take 1 tablet by mouth Daily, Disp: 90 tablet, Rfl: 1    Medicines reviewed by Anneliese Dale [facility] on 6/17/2025 at  2:21 PM    Drug Interactions  No significant drug-drug interactions with Praluent according to literature    Adherence and Self-Administration  Adherence related to the patient's specialty therapy was discussed with the patient. The Adherence segment of this outreach has been reviewed and updated.              Methods for Supporting Patient Adherence and/or Self-Administration: see plan, if applicable     Open Medication Therapy Problems  No medication therapy recommendations to display    Goals of Therapy  Goals related to the patient's specialty therapy were discussed with the patient. The Patient Goals segment of this outreach has been reviewed and updated.   Goals Addressed Today        Specialty Pharmacy General Goal      LDL goal < 55    6/17/25 ALETA: Most recent LDL of 103 on 3/3/25              Medication Assessment & Plan  Medication Therapy Changes: Patient started today on Praluent 75mg subcutaneous every 14 days .   Injection training and medication education provided.   I administered in RIGHT arm   Welcome information and patient satisfaction survey to be sent by specialty pharmacy team with patient's initial fill.  Related Plans, Therapy Recommendations, or Therapy Problems to Be Addressed: None  Patient will need lipid panel in 6 weeks, order placed. Patient  given lab order.   Patient will continue regular follow-up with cardiology. Next follow-up on 12/15/25. Had recent follow-up on 6/9/25.  Patient will follow up with specialty pharmacy  services. Care Coordinator to set up future refill outreaches, coordinate prescription delivery, and escalate clinical questions to pharmacist.  Pharmacist to perform regular assessments no more than (6) months from the previous assessment. Will follow-up in 6 months, or sooner if needed.    Initial Education Provided for Specialty Medication  The patient has been provided with the following education and any applicable administration techniques (i.e. self-injection) have been demonstrated for the therapies indicated. All questions and concerns have been addressed prior to the patient receiving the medication, and the patient has verbalized comprehension of the education and any materials provided. Additional patient education shall be provided and documented upon request by the patient, provider, or payer.    PRALUENT® (alirocumab)  Medication Expectations   Why am I taking this medication? You are taking Praluent to lower your “bad” cholesterol (LDL-C). This medication can be used in adults with high blood cholesterol including primary hyperlipidemia and familial hypercholesterolemia. It can help reduce the risk of heart attack and stroke.      What should I expect while on this medication? You should expect to see your cholesterol improve over time. Specifically, you should see your LDL-C decrease.    How does the medication work? Praluent works by blocking a protein called PCSK9 that contributes to high levels of bad cholesterol and it helps increase your liver's ability to remove bad cholesterol from your blood.     How long will I be on this medication for? The amount of time you will be on this medication will be determined by your doctor based on your cholesterol and/or your risk of having a cardiac event. You will most  likely be on this medication or another cholesterol medication throughout your lifetime. Do not abruptly stop this medication without talking to your doctor first.    How do I take this medication? Take as directed on your prescription label. Praluent is injected under the skin (subcutaneously) of your stomach, thigh, or upper arm. This medication is usually given one or twice a month.     What are some possible side effects? The most common side effects of Praluent include redness, itching, swelling, or pain/tenderness at the injection site, symptoms of the common cold, and flu or flu-like symptoms. Praluent can also cause diarrhea and muscle spasms.    What happens if I miss a dose? If you miss a dose, take it as soon as you remember if it is within 7 days from the usual day of administration then resume your original schedule. If it is beyond 7 days, skip the missed dose and resume your normal dosing schedule.      Medication Safety   What are things I should warn my doctor immediately about? Talk to your doctor if you are pregnant, planning to become pregnant, or breastfeeding. Stop the medication and tell your doctor or seek emergency medical help if you notice any signs/symptoms of an allergic reaction (severe rash, redness, hives, severe itching, trouble breathing, or swelling of the face, lips, or tongue). Do not take Praluent if you have an allergy to alirocumab or any of the ingredients in Praluent.    What are things that I should be cautious of? Be cautious of any side effects from this medication. Talk to your doctor if any new ones develop or aren't getting better.   What are some medications that can interact with this one? There are no known significant drug interactions with Praluent. Always tell your doctor or pharmacist immediately if you start taking any new medications, including over-the-counter medications, vitamins, and herbal supplements.      Medication Storage/Handling   How should I  handle this medication? Do not shake or expose the pen to extreme heat or direct sunlight. Keep this medication out of reach of pets/children.    How does this medication need to be stored? Store unused pens in the refrigerator in the original carton to protect from light. Allow medication to warm at room temperature prior to administration. If needed, Praluent may be kept at room temperature in the original carton for up to 30 days. Do not freeze.    How should I dispose of this medication? The device is a single-dose disposable pen and should be discarded in a sharps container after use. The blue caps should also be placed in a sharps container. If you do not own a sharps container, you may use a household container made of heavy-duty plastic with a tight-fitting lid that is leak resistant (e.g., heavty-duty plastic laundry detergent bottle).  If your doctor decides to stop this medication, take to your local police station for proper disposal. Some pharmacies also have take-back bins for medication drop-off.      Resources/Support   How can I remind myself to take this medication? You can download reminder apps to help you manage your refills. You may also set an alarm on your phone to remind you to take your dose.    Is financial support available?  [employer] can provide co-pay cards if you have commercial insurance or patient assistance if you have Medicare or no insurance.    Which vaccines are recommended for me? Talk to your doctor about these vaccines: Flu, Coronavirus (COVID-19), Pneumococcal (pneumonia), Tdap, Hepatitis B, Zoster (shingles)            Attestation      I attest the patient was actively involved in and has agreed to the above plan of care. If the prescribed therapy is at any point deemed not appropriate based on the current or future assessments, a consultation will be initiated with the patient's specialty care provider to determine the best course of action. The revised plan of therapy  will be documented along with any required assessments and/or additional patient education provided.     Anneliese Dale RPH  6/17/2025  16:28 EDT

## 2025-07-07 ENCOUNTER — TELEPHONE (OUTPATIENT)
Dept: FAMILY MEDICINE CLINIC | Facility: CLINIC | Age: 60
End: 2025-07-07
Payer: COMMERCIAL

## 2025-07-07 NOTE — TELEPHONE ENCOUNTER
Hub to relay     I was unable to reach aly with his new appt date and time for October with dr kaleigh farris will be out office 10/13/2025 new appt is 10/21/2025at 2 pm    I will mail letter also

## 2025-07-16 ENCOUNTER — TELEPHONE (OUTPATIENT)
Dept: CARDIOLOGY | Facility: CLINIC | Age: 60
End: 2025-07-16

## 2025-07-16 DIAGNOSIS — I25.10 CORONARY ARTERY DISEASE INVOLVING NATIVE CORONARY ARTERY OF NATIVE HEART WITHOUT ANGINA PECTORIS: Primary | Chronic | ICD-10-CM

## 2025-07-16 RX ORDER — CLOPIDOGREL BISULFATE 75 MG/1
75 TABLET ORAL DAILY
Qty: 90 TABLET | Refills: 3 | Status: SHIPPED | OUTPATIENT
Start: 2025-07-16

## 2025-07-28 ENCOUNTER — TELEPHONE (OUTPATIENT)
Dept: CARDIOLOGY | Facility: CLINIC | Age: 60
End: 2025-07-28
Payer: MEDICAID

## 2025-07-28 NOTE — TELEPHONE ENCOUNTER
No messages received for at least 21 days  Last message received 21 days ago. The patient will be deactivated in 68 days.  Called to speak with patient regarding [employer] monitor not transmitting. No answer and mailbox was full and could not leave a message.

## 2025-08-11 ENCOUNTER — DISEASE STATE MANAGEMENT VISIT (OUTPATIENT)
Dept: PHARMACY | Facility: HOSPITAL | Age: 60
End: 2025-08-11
Payer: MEDICAID

## 2025-08-27 RX ORDER — SPIRONOLACTONE 25 MG/1
12.5 TABLET ORAL DAILY
Qty: 45 TABLET | Refills: 1 | Status: SHIPPED | OUTPATIENT
Start: 2025-08-27

## (undated) DEVICE — MANIFLD NAMIC PRECEPTOR INTEGR/TRANSD RT/HND 1/PRT 500PSI

## (undated) DEVICE — SHEATH INTRO SUPERSHEATH SHRT .035 4F 11CM

## (undated) DEVICE — DECANT BG O JET

## (undated) DEVICE — ASMBL SPK CONTRST CONTRL

## (undated) DEVICE — CATH F5 INF PIG145 110CM 6SH: Brand: INFINITI

## (undated) DEVICE — Device: Brand: MEDEX

## (undated) DEVICE — PINNACLE INTRODUCER SHEATH: Brand: PINNACLE

## (undated) DEVICE — GW J/TP MOVE/CORE 0.035 3MM/TP 145CM

## (undated) DEVICE — 6F .070 3 DRC 100CM: Brand: VISTA BRITE TIP

## (undated) DEVICE — SET PRIMARY GRVTY 10DP MALE LL 104IN

## (undated) DEVICE — ELECTRD RETRN/GRND MEGADYNE SGL/PLT W/CORD 9FT DISP

## (undated) DEVICE — TRAP FLD MINIVAC MEGADYNE 100ML

## (undated) DEVICE — ADULT DISPOSABLE SINGLE-PATIENT USE PULSE OXIMETER SENSOR: Brand: NONIN

## (undated) DEVICE — TREK CORONARY DILATATION CATHETER 2.50 MM X 8 MM / RAPID-EXCHANGE: Brand: TREK

## (undated) DEVICE — ST EXT IV SMRTSTE 2VLV FIX M LL 6ML 41

## (undated) DEVICE — ADULT NASAL CO2 SAMPLING WITH O2 DELIVERY CANNULA FOR CAPNOFLEX MODULE: Brand: VITAL SIGNS™

## (undated) DEVICE — INTRO TEAR AWAY/LVD W/SD PRT 9F 13CM

## (undated) DEVICE — ADULT, W/LG. BACK PAD, RADIOTRANSPARENT ELEMENT AND LEAD WIRE COMPATIBLE W/: Brand: DEFIBRILLATION ELECTRODES

## (undated) DEVICE — DRSNG SURESITE WNDW 4X4.5

## (undated) DEVICE — MEDI-VAC YANKAUER SUCTION HANDLE W/BULBOUS TIP: Brand: CARDINAL HEALTH

## (undated) DEVICE — ANGIO-SEAL VIP VASCULAR CLOSURE DEVICE: Brand: ANGIO-SEAL

## (undated) DEVICE — LIMB HOLDER, WRIST/ANKLE: Brand: DEROYAL

## (undated) DEVICE — IRRIGATOR BULB ASEPTO 60CC STRL

## (undated) DEVICE — LEX ELECTRO PHYSIOLOGY: Brand: MEDLINE INDUSTRIES, INC.

## (undated) DEVICE — 360 - 360I 10"/10" CMSQ 8RA: Brand: MEDLINE

## (undated) DEVICE — DEV INFL MONARCH 25W

## (undated) DEVICE — LN FLTR ORL/NASL MICROSTREAM NONINTUB A/LNG

## (undated) DEVICE — ST INF PRI SMRTSTE 20DRP 2VLV 24ML 117

## (undated) DEVICE — NDL PERC 1PART ECHOTIP WO/BASEPLT 18G 7CM

## (undated) DEVICE — TUBING, SUCTION, 1/4" X 10', STRAIGHT: Brand: MEDLINE

## (undated) DEVICE — PENCL SMOKE/EVAC MEGADYNE TELESCP 10FT

## (undated) DEVICE — CAUTERY TIP POLISHER: Brand: DEVON

## (undated) DEVICE — PK CATH CARD 70

## (undated) DEVICE — PAD, DEFIB, ADULT, RADIOTRANS, ZOLL: Brand: MEDLINE

## (undated) DEVICE — Device: Brand: ASAHI SION BLUE

## (undated) DEVICE — CATH F5 INF JL 4 100CM: Brand: INFINITI

## (undated) DEVICE — DRSNG SURG AQUACEL AG/ADVNTGE 9X15CM 3.5X6IN

## (undated) DEVICE — SOL NACL 0.9PCT 1000ML